# Patient Record
Sex: FEMALE | Employment: OTHER | URBAN - METROPOLITAN AREA
[De-identification: names, ages, dates, MRNs, and addresses within clinical notes are randomized per-mention and may not be internally consistent; named-entity substitution may affect disease eponyms.]

---

## 2022-08-16 ENCOUNTER — APPOINTMENT (OUTPATIENT)
Dept: CT IMAGING | Age: 82
End: 2022-08-16
Payer: MEDICARE

## 2022-08-16 ENCOUNTER — APPOINTMENT (OUTPATIENT)
Dept: GENERAL RADIOLOGY | Age: 82
End: 2022-08-16
Payer: MEDICARE

## 2022-08-16 ENCOUNTER — HOSPITAL ENCOUNTER (EMERGENCY)
Age: 82
Discharge: LEFT BEFORE DISCHARGE | End: 2022-08-16
Attending: EMERGENCY MEDICINE
Payer: MEDICARE

## 2022-08-16 VITALS
DIASTOLIC BLOOD PRESSURE: 92 MMHG | SYSTOLIC BLOOD PRESSURE: 187 MMHG | BODY MASS INDEX: 29.16 KG/M2 | TEMPERATURE: 97.6 F | HEART RATE: 106 BPM | OXYGEN SATURATION: 96 % | WEIGHT: 175 LBS | RESPIRATION RATE: 19 BRPM | HEIGHT: 65 IN

## 2022-08-16 DIAGNOSIS — R05.9 COUGH: ICD-10-CM

## 2022-08-16 DIAGNOSIS — J44.1 COPD EXACERBATION (HCC): Primary | ICD-10-CM

## 2022-08-16 LAB
ALBUMIN SERPL-MCNC: 4 G/DL (ref 3.5–5.2)
ALP BLD-CCNC: 70 U/L (ref 35–104)
ALT SERPL-CCNC: 18 U/L (ref 0–32)
ANION GAP SERPL CALCULATED.3IONS-SCNC: 8 MMOL/L (ref 7–16)
AST SERPL-CCNC: 22 U/L (ref 0–31)
BACTERIA: ABNORMAL /HPF
BASOPHILS ABSOLUTE: 0.06 E9/L (ref 0–0.2)
BASOPHILS RELATIVE PERCENT: 0.9 % (ref 0–2)
BILIRUB SERPL-MCNC: 0.2 MG/DL (ref 0–1.2)
BILIRUBIN URINE: NEGATIVE
BLOOD, URINE: ABNORMAL
BUN BLDV-MCNC: 14 MG/DL (ref 6–23)
CALCIUM SERPL-MCNC: 9.6 MG/DL (ref 8.6–10.2)
CHLORIDE BLD-SCNC: 103 MMOL/L (ref 98–107)
CLARITY: CLEAR
CO2: 30 MMOL/L (ref 22–29)
COLOR: YELLOW
CREAT SERPL-MCNC: 0.9 MG/DL (ref 0.5–1)
D DIMER: 810 NG/ML DDU
EKG ATRIAL RATE: 86 BPM
EKG P AXIS: 75 DEGREES
EKG P-R INTERVAL: 102 MS
EKG Q-T INTERVAL: 336 MS
EKG QRS DURATION: 80 MS
EKG QTC CALCULATION (BAZETT): 390 MS
EKG R AXIS: 45 DEGREES
EKG T AXIS: 90 DEGREES
EKG VENTRICULAR RATE: 81 BPM
EOSINOPHILS ABSOLUTE: 0.07 E9/L (ref 0.05–0.5)
EOSINOPHILS RELATIVE PERCENT: 1.1 % (ref 0–6)
GFR AFRICAN AMERICAN: >60
GFR NON-AFRICAN AMERICAN: >60 ML/MIN/1.73
GLUCOSE BLD-MCNC: 144 MG/DL (ref 74–99)
GLUCOSE URINE: NEGATIVE MG/DL
HCT VFR BLD CALC: 34.3 % (ref 34–48)
HEMOGLOBIN: 10 G/DL (ref 11.5–15.5)
IMMATURE GRANULOCYTES #: 0.1 E9/L
IMMATURE GRANULOCYTES %: 1.6 % (ref 0–5)
INFLUENZA A BY PCR: NOT DETECTED
INFLUENZA B BY PCR: NOT DETECTED
INR BLD: 1
KETONES, URINE: NEGATIVE MG/DL
LEUKOCYTE ESTERASE, URINE: NEGATIVE
LYMPHOCYTES ABSOLUTE: 0.75 E9/L (ref 1.5–4)
LYMPHOCYTES RELATIVE PERCENT: 11.8 % (ref 20–42)
MAGNESIUM: 2.5 MG/DL (ref 1.6–2.6)
MCH RBC QN AUTO: 28.3 PG (ref 26–35)
MCHC RBC AUTO-ENTMCNC: 29.2 % (ref 32–34.5)
MCV RBC AUTO: 97.2 FL (ref 80–99.9)
MONOCYTES ABSOLUTE: 0.42 E9/L (ref 0.1–0.95)
MONOCYTES RELATIVE PERCENT: 6.6 % (ref 2–12)
NEUTROPHILS ABSOLUTE: 4.96 E9/L (ref 1.8–7.3)
NEUTROPHILS RELATIVE PERCENT: 78 % (ref 43–80)
NITRITE, URINE: NEGATIVE
PDW BLD-RTO: 14.4 FL (ref 11.5–15)
PH UA: 8 (ref 5–9)
PLATELET # BLD: 335 E9/L (ref 130–450)
PMV BLD AUTO: 8.5 FL (ref 7–12)
POTASSIUM REFLEX MAGNESIUM: 3.8 MMOL/L (ref 3.5–5)
PROTEIN UA: NEGATIVE MG/DL
PROTHROMBIN TIME: 10.8 SEC (ref 9.3–12.4)
RBC # BLD: 3.53 E12/L (ref 3.5–5.5)
RBC UA: ABNORMAL /HPF (ref 0–2)
SARS-COV-2, NAAT: NOT DETECTED
SODIUM BLD-SCNC: 141 MMOL/L (ref 132–146)
SPECIFIC GRAVITY UA: 1.01 (ref 1–1.03)
TOTAL PROTEIN: 7.1 G/DL (ref 6.4–8.3)
TROPONIN, HIGH SENSITIVITY: 19 NG/L (ref 0–9)
TROPONIN, HIGH SENSITIVITY: 22 NG/L (ref 0–9)
UROBILINOGEN, URINE: 0.2 E.U./DL
WBC # BLD: 6.4 E9/L (ref 4.5–11.5)
WBC UA: ABNORMAL /HPF (ref 0–5)

## 2022-08-16 PROCEDURE — 85378 FIBRIN DEGRADE SEMIQUANT: CPT

## 2022-08-16 PROCEDURE — 6370000000 HC RX 637 (ALT 250 FOR IP)

## 2022-08-16 PROCEDURE — 83735 ASSAY OF MAGNESIUM: CPT

## 2022-08-16 PROCEDURE — 80053 COMPREHEN METABOLIC PANEL: CPT

## 2022-08-16 PROCEDURE — 71275 CT ANGIOGRAPHY CHEST: CPT

## 2022-08-16 PROCEDURE — 99285 EMERGENCY DEPT VISIT HI MDM: CPT

## 2022-08-16 PROCEDURE — 81001 URINALYSIS AUTO W/SCOPE: CPT

## 2022-08-16 PROCEDURE — 85610 PROTHROMBIN TIME: CPT

## 2022-08-16 PROCEDURE — 94640 AIRWAY INHALATION TREATMENT: CPT

## 2022-08-16 PROCEDURE — 84484 ASSAY OF TROPONIN QUANT: CPT

## 2022-08-16 PROCEDURE — 36415 COLL VENOUS BLD VENIPUNCTURE: CPT

## 2022-08-16 PROCEDURE — 93005 ELECTROCARDIOGRAM TRACING: CPT

## 2022-08-16 PROCEDURE — 6360000002 HC RX W HCPCS

## 2022-08-16 PROCEDURE — 85025 COMPLETE CBC W/AUTO DIFF WBC: CPT

## 2022-08-16 PROCEDURE — 71045 X-RAY EXAM CHEST 1 VIEW: CPT

## 2022-08-16 PROCEDURE — 87635 SARS-COV-2 COVID-19 AMP PRB: CPT

## 2022-08-16 PROCEDURE — 96374 THER/PROPH/DIAG INJ IV PUSH: CPT

## 2022-08-16 PROCEDURE — 87502 INFLUENZA DNA AMP PROBE: CPT

## 2022-08-16 PROCEDURE — 6360000004 HC RX CONTRAST MEDICATION: Performed by: RADIOLOGY

## 2022-08-16 RX ORDER — METHYLPREDNISOLONE SODIUM SUCCINATE 125 MG/2ML
80 INJECTION, POWDER, LYOPHILIZED, FOR SOLUTION INTRAMUSCULAR; INTRAVENOUS ONCE
Status: COMPLETED | OUTPATIENT
Start: 2022-08-16 | End: 2022-08-16

## 2022-08-16 RX ORDER — GUAIFENESIN 600 MG/1
600 TABLET, EXTENDED RELEASE ORAL 2 TIMES DAILY
Qty: 10 TABLET | Refills: 0 | Status: SHIPPED | OUTPATIENT
Start: 2022-08-16 | End: 2022-08-21

## 2022-08-16 RX ORDER — IPRATROPIUM BROMIDE AND ALBUTEROL SULFATE 2.5; .5 MG/3ML; MG/3ML
1 SOLUTION RESPIRATORY (INHALATION)
Status: DISCONTINUED | OUTPATIENT
Start: 2022-08-16 | End: 2022-08-16 | Stop reason: HOSPADM

## 2022-08-16 RX ORDER — PREDNISONE 20 MG/1
40 TABLET ORAL DAILY
Qty: 10 TABLET | Refills: 0 | Status: SHIPPED | OUTPATIENT
Start: 2022-08-16 | End: 2022-08-21

## 2022-08-16 RX ORDER — ASPIRIN 81 MG/1
324 TABLET, CHEWABLE ORAL ONCE
Status: COMPLETED | OUTPATIENT
Start: 2022-08-16 | End: 2022-08-16

## 2022-08-16 RX ORDER — LORAZEPAM 0.5 MG/1
0.5 TABLET ORAL ONCE
Status: COMPLETED | OUTPATIENT
Start: 2022-08-16 | End: 2022-08-16

## 2022-08-16 RX ORDER — AZITHROMYCIN 500 MG/1
500 TABLET, FILM COATED ORAL DAILY
Qty: 5 TABLET | Refills: 0 | Status: SHIPPED | OUTPATIENT
Start: 2022-08-16 | End: 2022-08-21

## 2022-08-16 RX ORDER — BENZONATATE 100 MG/1
100 CAPSULE ORAL 2 TIMES DAILY PRN
Qty: 10 CAPSULE | Refills: 0 | Status: SHIPPED | OUTPATIENT
Start: 2022-08-16 | End: 2022-08-21

## 2022-08-16 RX ADMIN — IPRATROPIUM BROMIDE AND ALBUTEROL SULFATE 1 AMPULE: 2.5; .5 SOLUTION RESPIRATORY (INHALATION) at 15:51

## 2022-08-16 RX ADMIN — ASPIRIN 81 MG CHEWABLE TABLET 324 MG: 81 TABLET CHEWABLE at 11:37

## 2022-08-16 RX ADMIN — LORAZEPAM 0.5 MG: 0.5 TABLET ORAL at 14:28

## 2022-08-16 RX ADMIN — METHYLPREDNISOLONE SODIUM SUCCINATE 80 MG: 125 INJECTION, POWDER, FOR SOLUTION INTRAMUSCULAR; INTRAVENOUS at 14:28

## 2022-08-16 RX ADMIN — IPRATROPIUM BROMIDE AND ALBUTEROL SULFATE 1 AMPULE: 2.5; .5 SOLUTION RESPIRATORY (INHALATION) at 11:35

## 2022-08-16 RX ADMIN — IOPAMIDOL 65 ML: 755 INJECTION, SOLUTION INTRAVENOUS at 12:59

## 2022-08-16 ASSESSMENT — ENCOUNTER SYMPTOMS
CHEST TIGHTNESS: 1
EYE DISCHARGE: 0
ABDOMINAL DISTENTION: 0
EYE ITCHING: 0
BACK PAIN: 0
SHORTNESS OF BREATH: 1
COLOR CHANGE: 0
WHEEZING: 1
ABDOMINAL PAIN: 0

## 2022-08-16 ASSESSMENT — PAIN DESCRIPTION - LOCATION: LOCATION: CHEST

## 2022-08-16 ASSESSMENT — PAIN - FUNCTIONAL ASSESSMENT: PAIN_FUNCTIONAL_ASSESSMENT: 0-10

## 2022-08-16 ASSESSMENT — PAIN DESCRIPTION - DESCRIPTORS: DESCRIPTORS: PRESSURE

## 2022-08-16 ASSESSMENT — PAIN SCALES - GENERAL: PAINLEVEL_OUTOF10: 8

## 2022-08-16 NOTE — ED NOTES
Patient states she uses 6 L O2 NC at baseline at home and has for several years. SPO2 100% on 6L NC. Patient titrated down to 2 L NC by Dr Bj Carlos and patient at 97%.      Klaudia Clay RN  08/16/22 6006

## 2022-08-16 NOTE — ED PROVIDER NOTES
L.  Patient reports, and her  reports, this is her baseline. We recommended the patient stay for further evaluation work-up. They are adamant that they want to go home, and that this is normal for her. They decided to sign out AMA. The attending physician, resident physician and the nurse discussed all the risks/rewards/benefits of staying or leaving AMA. We Discussed the risk of hypoxia leading to cardiopulmonary arrest.  The patient and her  were adamant on leaving.  stated, \"I have been taking care of her for over 48 years. \"  They were given a primary care physician within the [facility] to contact. She was also prescribed azithromycin and prednisone for 5 days, Mucinex, Tessalon Perles. All questions and concerns answered. Return precautions to the ER given. ED Course as of 08/16/22 1631   Tue Aug 16, 2022   1046 EKG Interpretation    Interpreted by emergency department physician    Rhythm: normal sinus   Rate: normal  Axis: normal  Ectopy: none  Conduction: normal  ST Segments: no acute change  T Waves: no acute change  Q Waves: none    Clinical Impression: no acute changes    Avelina Self DO  [JR]   1626 This patient has chosen to leave against medical advice. I have personally explained to them that choosing to do so may result in permanent bodily harm or death. I discussed at length that without further evaluation and monitoring there may be unforeseen circumstances and deterioration resulting in permanent bodily harm or death as a result of their choice. They are alert, oriented, and competent at this time. They state that they are aware of the serious risks as explained, but they continue to wish to leave against medical advice. Patient instructed to follow up with PCP tomorrow. They have been advised that they should return to the ED immediately if they change their mind at any time, or if their condition begins to change or worsen.   [JR]      ED Course User Index  [JR] Narinder Wynne DO         ED Course as of 08/16/22 1631   Tue Aug 16, 2022   1046 EKG Interpretation    Interpreted by emergency department physician    Rhythm: normal sinus   Rate: normal  Axis: normal  Ectopy: none  Conduction: normal  ST Segments: no acute change  T Waves: no acute change  Q Waves: none    Clinical Impression: no acute changes    Narinder Wynne DO  [JR]   1623 This patient has chosen to leave against medical advice. I have personally explained to them that choosing to do so may result in permanent bodily harm or death. I discussed at length that without further evaluation and monitoring there may be unforeseen circumstances and deterioration resulting in permanent bodily harm or death as a result of their choice. They are alert, oriented, and competent at this time. They state that they are aware of the serious risks as explained, but they continue to wish to leave against medical advice. Patient instructed to follow up with PCP tomorrow. They have been advised that they should return to the ED immediately if they change their mind at any time, or if their condition begins to change or worsen. [JR]      ED Course User Index  [JR] Narinder Wynne DO       --------------------------------------------- PAST HISTORY ---------------------------------------------  Past Medical History:  has no past medical history on file. Past Surgical History:  has no past surgical history on file. Social History:      Family History: family history is not on file. The patients home medications have been reviewed. Allergies: Patient has no known allergies.     -------------------------------------------------- RESULTS -------------------------------------------------  Labs:  Results for orders placed or performed during the hospital encounter of 08/16/22   COVID-19, Rapid    Specimen: Nasopharyngeal Swab   Result Value Ref Range    SARS-CoV-2, NAAT Not Detected Not Detected   RAPID INFLUENZA A/B ANTIGENS    Specimen: Nasopharyngeal   Result Value Ref Range    Influenza A by PCR Not Detected Not Detected    Influenza B by PCR Not Detected Not Detected   CBC with Auto Differential   Result Value Ref Range    WBC 6.4 4.5 - 11.5 E9/L    RBC 3.53 3.50 - 5.50 E12/L    Hemoglobin 10.0 (L) 11.5 - 15.5 g/dL    Hematocrit 34.3 34.0 - 48.0 %    MCV 97.2 80.0 - 99.9 fL    MCH 28.3 26.0 - 35.0 pg    MCHC 29.2 (L) 32.0 - 34.5 %    RDW 14.4 11.5 - 15.0 fL    Platelets 015 543 - 818 E9/L    MPV 8.5 7.0 - 12.0 fL    Neutrophils % 78.0 43.0 - 80.0 %    Immature Granulocytes % 1.6 0.0 - 5.0 %    Lymphocytes % 11.8 (L) 20.0 - 42.0 %    Monocytes % 6.6 2.0 - 12.0 %    Eosinophils % 1.1 0.0 - 6.0 %    Basophils % 0.9 0.0 - 2.0 %    Neutrophils Absolute 4.96 1.80 - 7.30 E9/L    Immature Granulocytes # 0.10 E9/L    Lymphocytes Absolute 0.75 (L) 1.50 - 4.00 E9/L    Monocytes Absolute 0.42 0.10 - 0.95 E9/L    Eosinophils Absolute 0.07 0.05 - 0.50 E9/L    Basophils Absolute 0.06 0.00 - 0.20 E9/L   Comprehensive Metabolic Panel w/ Reflex to MG   Result Value Ref Range    Sodium 141 132 - 146 mmol/L    Potassium reflex Magnesium 3.8 3.5 - 5.0 mmol/L    Chloride 103 98 - 107 mmol/L    CO2 30 (H) 22 - 29 mmol/L    Anion Gap 8 7 - 16 mmol/L    Glucose 144 (H) 74 - 99 mg/dL    BUN 14 6 - 23 mg/dL    Creatinine 0.9 0.5 - 1.0 mg/dL    GFR Non-African American >60 >=60 mL/min/1.73    GFR African American >60     Calcium 9.6 8.6 - 10.2 mg/dL    Total Protein 7.1 6.4 - 8.3 g/dL    Albumin 4.0 3.5 - 5.2 g/dL    Total Bilirubin 0.2 0.0 - 1.2 mg/dL    Alkaline Phosphatase 70 35 - 104 U/L    ALT 18 0 - 32 U/L    AST 22 0 - 31 U/L   Magnesium   Result Value Ref Range    Magnesium 2.5 1.6 - 2.6 mg/dL   Urinalysis   Result Value Ref Range    Color, UA Yellow Straw/Yellow    Clarity, UA Clear Clear    Glucose, Ur Negative Negative mg/dL    Bilirubin Urine Negative Negative    Ketones, Urine Negative Negative mg/dL Specific Gravity, UA 1.010 1.005 - 1.030    Blood, Urine LARGE (A) Negative    pH, UA 8.0 5.0 - 9.0    Protein, UA Negative Negative mg/dL    Urobilinogen, Urine 0.2 <2.0 E.U./dL    Nitrite, Urine Negative Negative    Leukocyte Esterase, Urine Negative Negative   Protime-INR   Result Value Ref Range    Protime 10.8 9.3 - 12.4 sec    INR 1.0    D-Dimer, Quantitative   Result Value Ref Range    D-Dimer, Quant 810 ng/mL DDU   Troponin   Result Value Ref Range    Troponin, High Sensitivity 22 (H) 0 - 9 ng/L   Troponin   Result Value Ref Range    Troponin, High Sensitivity 19 (H) 0 - 9 ng/L   Microscopic Urinalysis   Result Value Ref Range    WBC, UA NONE 0 - 5 /HPF    RBC, UA 1-3 0 - 2 /HPF    Bacteria, UA MANY (A) None Seen /HPF   EKG 12 Lead   Result Value Ref Range    Ventricular Rate 81 BPM    Atrial Rate 86 BPM    P-R Interval 102 ms    QRS Duration 80 ms    Q-T Interval 336 ms    QTc Calculation (Bazett) 390 ms    P Axis 75 degrees    R Axis 45 degrees    T Axis 90 degrees       Radiology:  CTA PULMONARY W CONTRAST   Final Result   No visible pulmonary embolism. Mild atelectasis or scarring in both lungs. Very small hiatal hernia. XR CHEST PORTABLE   Final Result   No acute process. ------------------------- NURSING NOTES AND VITALS REVIEWED ---------------------------  Date / Time Roomed:  8/16/2022 10:31 AM  ED Bed Assignment:  02/02    The nursing notes within the ED encounter and vital signs as below have been reviewed.    BP (!) 187/92   Pulse (!) 106   Temp 97.6 °F (36.4 °C)   Resp 19   Ht 5' 5\" (1.651 m)   Wt 175 lb (79.4 kg)   SpO2 96%   BMI 29.12 kg/m²   Oxygen Saturation Interpretation: Normal      ------------------------------------------ PROGRESS NOTES ------------------------------------------  4:05 PM EDT  I have spoken with the patient and discussed todays results, in addition to providing specific details for the plan of care and counseling regarding the diagnosis and prognosis. Their questions are answered at this time and they are agreeable with the plan. I discussed at length with them reasons for immediate return here for re evaluation. They will followup with their primary care physician by calling their office tomorrow. --------------------------------- ADDITIONAL PROVIDER NOTES ---------------------------------  At this time the patient is without objective evidence of an acute process requiring hospitalization or inpatient management. They have remained hemodynamically stable throughout their entire ED visit and are stable for discharge with outpatient follow-up. The plan has been discussed in detail and they are aware of the specific conditions for emergent return, as well as the importance of follow-up. New Prescriptions    AZITHROMYCIN (ZITHROMAX) 500 MG TABLET    Take 1 tablet by mouth in the morning for 5 days. BENZONATATE (TESSALON) 100 MG CAPSULE    Take 1 capsule by mouth 2 times daily as needed for Cough    GUAIFENESIN (MUCINEX) 600 MG EXTENDED RELEASE TABLET    Take 1 tablet by mouth in the morning and 1 tablet before bedtime. Do all this for 5 days. PREDNISONE (DELTASONE) 20 MG TABLET    Take 2 tablets by mouth in the morning for 5 days. Diagnosis:  1. COPD exacerbation ([address] Improving   2. Cough Improving       Disposition:  Patient's disposition: AMA  Patient's condition is stable. Unfortunately, Javier Bond at 4:31 PM decided to leave the Emergency Department Against Medical Advice. Javier Bond is clinically sober, free of any distracting injury, appears to be of sound mind with intact judgement and insight, and in my opinion has the capacity to make decisions. she presented to the Emergency Department to be evaluated for Chest Pain (Rx pneumonia dx on augmentin.   Wears 6L at baseline), Cough, and Otalgia   and understands that I am concerned about the possibility of respiratory arrest, hypoxia, and death. I have explained the nature of the evaluation so for and she understands the results and that the evaluation so far has been limited and cannot exclude disability, cardiovascular arrest, cardiopulmonary arrest, hypoxemia, and death and that by not undergoing further evaluation and management specific risks include: Permanent disability and death. We have discussed alternative treatments and the patient was given Rx of Azithromycin, prednizone, mucinex, and tessalon perles and referred to DO Jorje Jacob [facility] is unwilling to stay for the recommended evaluation and management and wishes to leave against medical advice. I am unable to convince the patient to stay, I have asked them to return as soon as possible to complete their evaluation.  I have answered all their questions         Miles Cantu DO  Resident  08/16/22 9940

## 2022-08-16 NOTE — ED NOTES
Patient ambulated on 6L starting at 97% and ending at 72%.  Tech stayed with patient until O2 returned to 98% on [email or web address]  08/16/22 1912

## 2022-08-16 NOTE — ED NOTES
Physician aware of vitals. Patient leaving AMA and has been explained risks of leaving AMA by Dr Jerrod Wells and Dr Queen Vidal, patient and  voiced understanding.       Wendy Friday, RUFINO  08/16/22 4236

## 2022-09-08 ENCOUNTER — OFFICE VISIT (OUTPATIENT)
Dept: FAMILY MEDICINE CLINIC | Age: 82
End: 2022-09-08
Payer: MEDICARE

## 2022-09-08 VITALS
DIASTOLIC BLOOD PRESSURE: 60 MMHG | TEMPERATURE: 98 F | OXYGEN SATURATION: 94 % | HEART RATE: 78 BPM | SYSTOLIC BLOOD PRESSURE: 132 MMHG | WEIGHT: 170 LBS | BODY MASS INDEX: 28.29 KG/M2

## 2022-09-08 DIAGNOSIS — Z86.718 HISTORY OF DVT (DEEP VEIN THROMBOSIS): ICD-10-CM

## 2022-09-08 DIAGNOSIS — J44.9 END STAGE COPD (HCC): ICD-10-CM

## 2022-09-08 DIAGNOSIS — F41.9 ANXIETY: Primary | ICD-10-CM

## 2022-09-08 DIAGNOSIS — F40.00 AGORAPHOBIA: ICD-10-CM

## 2022-09-08 DIAGNOSIS — I10 HYPERTENSION, UNSPECIFIED TYPE: ICD-10-CM

## 2022-09-08 PROCEDURE — 99204 OFFICE O/P NEW MOD 45 MIN: CPT | Performed by: STUDENT IN AN ORGANIZED HEALTH CARE EDUCATION/TRAINING PROGRAM

## 2022-09-08 PROCEDURE — 1123F ACP DISCUSS/DSCN MKR DOCD: CPT | Performed by: STUDENT IN AN ORGANIZED HEALTH CARE EDUCATION/TRAINING PROGRAM

## 2022-09-08 RX ORDER — ASPIRIN 81 MG/1
81 TABLET, CHEWABLE ORAL DAILY
COMMUNITY
End: 2022-10-13

## 2022-09-08 RX ORDER — ALBUTEROL SULFATE 90 UG/1
2 AEROSOL, METERED RESPIRATORY (INHALATION) EVERY 6 HOURS PRN
COMMUNITY

## 2022-09-08 RX ORDER — LOSARTAN POTASSIUM 100 MG/1
100 TABLET ORAL DAILY
COMMUNITY
Start: 2022-09-06

## 2022-09-08 RX ORDER — FERROUS SULFATE 325(65) MG
325 TABLET ORAL
COMMUNITY

## 2022-09-08 RX ORDER — ALPRAZOLAM 0.25 MG/1
0.25 TABLET ORAL 3 TIMES DAILY
COMMUNITY
Start: 2022-09-06 | End: 2022-10-07 | Stop reason: SDUPTHER

## 2022-09-08 RX ORDER — ACETAZOLAMIDE 250 MG/1
250 TABLET ORAL DAILY
COMMUNITY
Start: 2022-08-17 | End: 2022-10-13 | Stop reason: SDUPTHER

## 2022-09-08 RX ORDER — PANTOPRAZOLE SODIUM 40 MG/1
40 TABLET, DELAYED RELEASE ORAL DAILY
COMMUNITY

## 2022-09-08 RX ORDER — AMLODIPINE BESYLATE 5 MG/1
5 TABLET ORAL DAILY
COMMUNITY
Start: 2022-08-04

## 2022-09-08 RX ORDER — FLUTICASONE FUROATE, UMECLIDINIUM BROMIDE AND VILANTEROL TRIFENATATE 100; 62.5; 25 UG/1; UG/1; UG/1
1 POWDER RESPIRATORY (INHALATION) DAILY
COMMUNITY
Start: 2022-08-29 | End: 2022-09-19 | Stop reason: SDUPTHER

## 2022-09-08 RX ORDER — CETIRIZINE HYDROCHLORIDE 10 MG/1
10 TABLET ORAL DAILY
COMMUNITY
End: 2022-10-05 | Stop reason: ALTCHOICE

## 2022-09-08 RX ORDER — IPRATROPIUM BROMIDE AND ALBUTEROL SULFATE 2.5; .5 MG/3ML; MG/3ML
1 SOLUTION RESPIRATORY (INHALATION) 3 TIMES DAILY
COMMUNITY
Start: 2022-07-02 | End: 2022-10-10 | Stop reason: SDUPTHER

## 2022-09-08 SDOH — ECONOMIC STABILITY: FOOD INSECURITY: WITHIN THE PAST 12 MONTHS, YOU WORRIED THAT YOUR FOOD WOULD RUN OUT BEFORE YOU GOT MONEY TO BUY MORE.: NEVER TRUE

## 2022-09-08 SDOH — ECONOMIC STABILITY: FOOD INSECURITY: WITHIN THE PAST 12 MONTHS, THE FOOD YOU BOUGHT JUST DIDN'T LAST AND YOU DIDN'T HAVE MONEY TO GET MORE.: NEVER TRUE

## 2022-09-08 ASSESSMENT — PATIENT HEALTH QUESTIONNAIRE - PHQ9
8. MOVING OR SPEAKING SO SLOWLY THAT OTHER PEOPLE COULD HAVE NOTICED. OR THE OPPOSITE, BEING SO FIGETY OR RESTLESS THAT YOU HAVE BEEN MOVING AROUND A LOT MORE THAN USUAL: 0
7. TROUBLE CONCENTRATING ON THINGS, SUCH AS READING THE NEWSPAPER OR WATCHING TELEVISION: 1
2. FEELING DOWN, DEPRESSED OR HOPELESS: 2
5. POOR APPETITE OR OVEREATING: 1
SUM OF ALL RESPONSES TO PHQ9 QUESTIONS 1 & 2: 3
SUM OF ALL RESPONSES TO PHQ QUESTIONS 1-9: 7
SUM OF ALL RESPONSES TO PHQ QUESTIONS 1-9: 7
10. IF YOU CHECKED OFF ANY PROBLEMS, HOW DIFFICULT HAVE THESE PROBLEMS MADE IT FOR YOU TO DO YOUR WORK, TAKE CARE OF THINGS AT HOME, OR GET ALONG WITH OTHER PEOPLE: 0
1. LITTLE INTEREST OR PLEASURE IN DOING THINGS: 1
SUM OF ALL RESPONSES TO PHQ QUESTIONS 1-9: 7
3. TROUBLE FALLING OR STAYING ASLEEP: 0
9. THOUGHTS THAT YOU WOULD BE BETTER OFF DEAD, OR OF HURTING YOURSELF: 0
4. FEELING TIRED OR HAVING LITTLE ENERGY: 2
6. FEELING BAD ABOUT YOURSELF - OR THAT YOU ARE A FAILURE OR HAVE LET YOURSELF OR YOUR FAMILY DOWN: 0
SUM OF ALL RESPONSES TO PHQ QUESTIONS 1-9: 7

## 2022-09-08 ASSESSMENT — ENCOUNTER SYMPTOMS
SHORTNESS OF BREATH: 1
NAUSEA: 0
ABDOMINAL PAIN: 0
RHINORRHEA: 0
COUGH: 0
VOMITING: 0
WHEEZING: 1

## 2022-09-08 ASSESSMENT — SOCIAL DETERMINANTS OF HEALTH (SDOH): HOW HARD IS IT FOR YOU TO PAY FOR THE VERY BASICS LIKE FOOD, HOUSING, MEDICAL CARE, AND HEATING?: NOT HARD AT ALL

## 2022-09-08 NOTE — PROGRESS NOTES
[facility]  Office Note  Dr. Betzy Scott      Patient:  Norberto Gonsalez 80 y.o. female     Date of Service: 22      Chief complaint:   Chief Complaint   Patient presents with    Establish Care     Needs pulmonary referral    COPD         History of Present Illness   The patient is a 80 y.o. female  presented to the clinic with complaints as above. Anxiety-she takes xanax three times a day. She has never tried any other medications. She gets extremely anxious to the point of becoming tearful any time she has to leave the house to go anywhere. She has been on xanax for years but her anxiety when leaving the house has been going on for about 2 months and is getting worse    COPD-on trelogy. Hx of smoking, quit almost 20 years ago. On continuous oxygen about 6 L. She would like a new pulm referral.    Has a history of L BKA from what sounds like severe claudication or some type of vascular issue. No problems with R leg currently except easy bruising. She is on aspirin    Hx of 2 DVT-both after surgery. She takes a baby aspirin now    HTN-at goal today, on norvasc losartan and metoprolol. No CP or leg swelling    Past Medical History:  No past medical history on file. PastSurgical History:    No past surgical history on file. Allergies:    Patient has no known allergies.     Social History:   Social History     Socioeconomic History    Marital status: Unknown     Spouse name: Not on file    Number of children: Not on file    Years of education: Not on file    Highest education level: Not on file   Occupational History    Not on file   Tobacco Use    Smoking status: Former     Types: Cigarettes     Quit date:      Years since quittin.6    Smokeless tobacco: Never   Substance and Sexual Activity    Alcohol use: Yes     Comment: socially    Drug use: Never    Sexual activity: Not on file   Other Topics Concern    Not on file   Social History Narrative    Not on file     Social Determinants of Health     Financial Resource Strain: Low Risk     Difficulty of Paying Living Expenses: Not hard at all   Food Insecurity: No Food Insecurity    Worried About Running Out of Food in the Last Year: Never true    Ran Out of Food in the Last Year: Never true   Transportation Needs: Not on file   Physical Activity: Not on file   Stress: Not on file   Social Connections: Not on file   Intimate Partner Violence: Not on file   Housing Stability: Not on file        Family History:   No family history on file. Review of Systems:   Review of Systems   Constitutional:  Negative for chills and fever. HENT:  Negative for congestion and rhinorrhea. Respiratory:  Positive for shortness of breath (chronic) and wheezing. Negative for cough. Cardiovascular:  Negative for chest pain and leg swelling. Gastrointestinal:  Negative for abdominal pain, nausea and vomiting. Genitourinary:  Negative for dysuria and hematuria. Musculoskeletal:  Negative for arthralgias and myalgias. Skin:  Negative for rash and wound. Neurological:  Negative for dizziness and light-headedness. Hematological:  Bruises/bleeds easily. Psychiatric/Behavioral:  Positive for dysphoric mood. Negative for suicidal ideas. The patient is nervous/anxious. Physical Exam   Vitals: /60   Pulse 78   Temp 98 °F (36.7 °C)   Wt 170 lb (77.1 kg)   SpO2 94% Comment: 4liters  BMI 28.29 kg/m²   Physical Exam    General:  Awake, alert, oriented X 3. Well developed, well nourished, well groomed. No apparent distress. HEENT:  Normocephalic, atraumatic. No scleral icterus. No conjunctival injection. No nasal discharge. Neck:  Supple  Heart:  RRR, no murmurs, gallops, or rubs  Lungs:  decreased breath sounds in all lung fields. no wheeze, rales, or rhonchi  Abdomen: Bowel sounds present, soft, nontender, no masses, no organomegaly, no peritoneal signs  Extremities:  No clubbing, cyanosis, or edema. L BKA.  Multiple healing bruises on R shin  Skin:  Warm and dry, no open lesions or rash  Neuro:  Cranial nerves 2-12 intact, no focal deficits    Assessment and Plan   Need to get records from old PCP  Will likely refer to vascular in the future-some care everywhere notes mention iliac artery stenosis. She has end stage COPD on oxygen and trelogy. She wants to wait to establish with pulm at the moment  Would prefer she stop or at least take less xanax-she has never tried an SSRI, will rx zoloft and she will try to wean down xanax. Follow up in a month to monitor progress. Discussed risks associated with xanax  Continue current HTN regimen, at goal    1. Anxiety      2. Agoraphobia      3. End stage COPD ([facility] [address]      4. History of DVT (deep vein thrombosis)      5. Hypertension, unspecified type      Counseled regarding above diagnosis, including possible risks and complications,  especially if left uncontrolled. Counseled regarding the possible side effects, risks, benefits and alternatives to treatment;patient and/or guardian verbalizes understanding, agrees, feels comfortable with and wishes to proceed with above treatment plan. Call or go to ED immediately if symptoms worsen or persist. Advised patient to call with any new medication issues, and, as applicable, read all Rx info from pharmacy to assure aware of all possible risks and side effects of medicationbefore taking. Patient and/or guardian given opportunity to ask questions/raise concerns. The patient verbalized comfort and understanding ofinstructions. Return to Office: Return in about 4 weeks (around 10/6/2022). Medication List:    Current Outpatient Medications   Medication Sig Dispense Refill    acetaZOLAMIDE (DIAMOX) 250 MG tablet 250 mg daily      ALPRAZolam (XANAX) 0.25 MG tablet Take 0.25 mg by mouth in the morning, at noon, and at bedtime.       amLODIPine (NORVASC) 5 MG tablet 5 mg daily      TRELEGY ELLIPTA 100-62.5-25 MCG/INH AEPB 1 puff daily ipratropium-albuterol (DUONEB) 0.5-2.5 (3) MG/3ML SOLN nebulizer solution Inhale 1 vial into the lungs 3 times daily      losartan (COZAAR) 100 MG tablet Take 100 mg by mouth daily      pantoprazole (PROTONIX) 40 MG tablet Take 40 mg by mouth daily      OXYGEN Inhale into the lungs      ferrous sulfate (IRON 325) 325 (65 Fe) MG tablet Take 325 mg by mouth daily (with breakfast)      cetirizine (ZYRTEC) 10 MG tablet Take 10 mg by mouth daily      metoprolol tartrate (LOPRESSOR) 25 MG tablet Take 25 mg by mouth daily      albuterol sulfate HFA (PROVENTIL;VENTOLIN;PROAIR) 108 (90 Base) MCG/ACT inhaler Inhale 2 puffs into the lungs every 6 hours as needed      aspirin 81 MG chewable tablet Take 81 mg by mouth daily      sertraline (ZOLOFT) 50 MG tablet Take 1 tablet by mouth daily 30 tablet 1     No current facility-administered medications for this visit. Alton Brown MD         This document may have been prepared at least partially through the use of voice recognition software. Although effort is taken to assure the accuracy ofthis document, it is possible that grammatical, syntax, or spelling errors may occur.

## 2022-09-12 ENCOUNTER — OFFICE VISIT (OUTPATIENT)
Dept: FAMILY MEDICINE CLINIC | Age: 82
End: 2022-09-12
Payer: MEDICARE

## 2022-09-12 VITALS
DIASTOLIC BLOOD PRESSURE: 68 MMHG | TEMPERATURE: 98.6 F | WEIGHT: 171 LBS | SYSTOLIC BLOOD PRESSURE: 128 MMHG | HEIGHT: 65 IN | HEART RATE: 67 BPM | BODY MASS INDEX: 28.49 KG/M2 | OXYGEN SATURATION: 95 % | RESPIRATION RATE: 18 BRPM

## 2022-09-12 DIAGNOSIS — S81.811A LACERATION OF RIGHT LOWER EXTREMITY, INITIAL ENCOUNTER: Primary | ICD-10-CM

## 2022-09-12 PROCEDURE — 1123F ACP DISCUSS/DSCN MKR DOCD: CPT | Performed by: STUDENT IN AN ORGANIZED HEALTH CARE EDUCATION/TRAINING PROGRAM

## 2022-09-12 PROCEDURE — 99213 OFFICE O/P EST LOW 20 MIN: CPT | Performed by: STUDENT IN AN ORGANIZED HEALTH CARE EDUCATION/TRAINING PROGRAM

## 2022-09-12 RX ORDER — MUPIROCIN CALCIUM 20 MG/G
CREAM TOPICAL
Qty: 15 G | Refills: 0 | Status: SHIPPED | OUTPATIENT
Start: 2022-09-12 | End: 2022-10-12

## 2022-09-12 ASSESSMENT — ENCOUNTER SYMPTOMS
VOMITING: 0
NAUSEA: 0
ABDOMINAL PAIN: 0

## 2022-09-12 NOTE — PROGRESS NOTES
Chandler Andrade (:  1940) is a 80 y.o. female,Established patient, here for evaluation of the following chief complaint(s):  Laceration (Patient states that she had slipped and cut her right leg while getting into the car last Thursday. . want to make sure there is no infection )         ASSESSMENT/PLAN:  1. Laceration of right lower extremity, initial encounter  -     mupirocin (BACTROBAN) 2 % cream; Apply 2 times daily. , Disp-15 g, R-0, Normal  Too late to suture. No evidence of infection. Advised x ray to rule out fracture. Patient declined at this time. Will give mupirocin although wound looks clean currently. Continue to dress with non stick pad (given in office)+ wrap. Gently clean with gentle soap and warm water. Discussed alarm signs and symptoms and what would prompt return or a trip to ER. Discussed return and ER precautions. Patient and or parent verbalized understanding    Return if symptoms worsen or fail to improve. Subjective   SUBJECTIVE/OBJECTIVE:  R shin injury  Hit her shin off part of a wheelchair when getting into car Thursday  Has been putting a dressing + neosporin on it  First encounter  Able to walk in it just fine. L sided BKA  No fever, no calf pain  Has not been talkin any OTC medication-does report it is tender/throbbing      Review of Systems   Constitutional:  Negative for chills and fever. Gastrointestinal:  Negative for abdominal pain, nausea and vomiting. Skin:  Positive for wound. Negative for rash. Neurological:  Negative for dizziness and light-headedness. Objective   Physical Exam  Vitals reviewed. Constitutional:       General: She is not in acute distress. HENT:      Head: Normocephalic and atraumatic. Eyes:      Extraocular Movements: Extraocular movements intact. Conjunctiva/sclera: Conjunctivae normal.   Cardiovascular:      Rate and Rhythm: Normal rate and regular rhythm.    Pulmonary:      Effort: Pulmonary effort is normal. Comments: Using nasal cannula  Skin:     Findings: Laceration present. Comments: Approx 5x1 laceration to the R tibia area. Surrounding bruising. No extensive erytyema, swelling, induration, abscress or drainage. The area is tender. Neurological:      General: No focal deficit present. Mental Status: She is alert and oriented to person, place, and time. An electronic signature was used to authenticate this note.     --Louellen Dance, MD

## 2022-09-16 ENCOUNTER — TELEPHONE (OUTPATIENT)
Dept: FAMILY MEDICINE CLINIC | Age: 82
End: 2022-09-16

## 2022-09-16 ENCOUNTER — OFFICE VISIT (OUTPATIENT)
Dept: FAMILY MEDICINE CLINIC | Age: 82
End: 2022-09-16
Payer: MEDICARE

## 2022-09-16 VITALS
WEIGHT: 171 LBS | RESPIRATION RATE: 17 BRPM | OXYGEN SATURATION: 93 % | DIASTOLIC BLOOD PRESSURE: 72 MMHG | SYSTOLIC BLOOD PRESSURE: 130 MMHG | HEIGHT: 65 IN | HEART RATE: 97 BPM | BODY MASS INDEX: 28.49 KG/M2 | TEMPERATURE: 97.3 F

## 2022-09-16 DIAGNOSIS — L03.115 CELLULITIS OF RIGHT LOWER EXTREMITY: ICD-10-CM

## 2022-09-16 DIAGNOSIS — S89.91XD INJURY OF RIGHT LOWER EXTREMITY, SUBSEQUENT ENCOUNTER: Primary | ICD-10-CM

## 2022-09-16 DIAGNOSIS — Z86.718 HISTORY OF DVT (DEEP VEIN THROMBOSIS): ICD-10-CM

## 2022-09-16 PROCEDURE — 99213 OFFICE O/P EST LOW 20 MIN: CPT | Performed by: INTERNAL MEDICINE

## 2022-09-16 PROCEDURE — 1123F ACP DISCUSS/DSCN MKR DOCD: CPT | Performed by: INTERNAL MEDICINE

## 2022-09-16 RX ORDER — DOXYCYCLINE HYCLATE 100 MG
100 TABLET ORAL 2 TIMES DAILY
Qty: 20 TABLET | Refills: 0 | Status: SHIPPED | OUTPATIENT
Start: 2022-09-16 | End: 2022-09-26

## 2022-09-16 NOTE — TELEPHONE ENCOUNTER
Patient called because the skin around her lower leg wound has some redness. She said that the red skin does feel warmer. Redness is only around the wound and does not go around the leg. Denies fever. She is using the Bactroban that Dr Rufina Jeffers prescribed on 9/12 but was instructed to call if she developed redness. I explained that the wound may be infected and it would be best to come into the office to have it looked at. Her  Piero Pedersen got on the phone and said that they skin looks pink and not red. Said that Ron Dunlap gets \"anxious\" about these things since her other leg was amputated. He is asking if she can send a picture of her lower leg. MyChart is not set up. Picture was sent to a cell phone. Dr Octavia Antonio and Amberly Raman reviewed and agreed that it does look infected and she needs to come in.

## 2022-09-16 NOTE — TELEPHONE ENCOUNTER
I do not see a tetanus on this lady's chart. She was seen for wound infection that occurred in the past week plus. If she has not had a tetanus shot in the last 10 years needs to have one done and should come back in. I put an order in for Tdap.

## 2022-09-17 ASSESSMENT — ENCOUNTER SYMPTOMS
COLOR CHANGE: 1
ABDOMINAL PAIN: 0
SHORTNESS OF BREATH: 1

## 2022-09-18 NOTE — PROGRESS NOTES
[address] [place]     22  Cierra Vidal : 1940 Sex: female  Age: 80 y.o. Chief Complaint   Patient presents with    Leg Injury     Laceration on the right leg, redness around the area        HPI    Patient presents to express care today in wheelchair accompanied by her  stating that back on the eighth of this month she was in seeing her PCP and when she left to go to the parking lot in the wheelchair she ended up catching her leg on the leg of the wheelchair lacerating right lower anterior leg. They did not come back in to be checked. She did come back into see her PCP 4 days ago and he did place her on Bactroban cream which they have been using. They are back in today concerned about infection because of increasing redness around the wound and tenderness to touch. They have just moved to the area recently and just started seeing their PCP. He is awaiting records from previous physician currently. Denies fever or chills. Denies drainage. Review of Systems   Constitutional:  Negative for chills and fever. HENT: Negative. Respiratory:  Positive for shortness of breath. History of COPD-on oxygen Trelegy and duo nebs   Cardiovascular:  Negative for chest pain and leg swelling. Gastrointestinal:  Negative for abdominal pain. Genitourinary:  Negative for dysuria and frequency. Skin:  Positive for color change and wound. Neurological:  Negative for weakness and numbness. REST OF PERTINENT ROS GONE OVER AND WAS NEGATIVE. Current Outpatient Medications:     doxycycline hyclate (VIBRA-TABS) 100 MG tablet, Take 1 tablet by mouth 2 times daily for 10 days, Disp: 20 tablet, Rfl: 0    mupirocin (BACTROBAN) 2 % cream, Apply 2 times daily. , Disp: 15 g, Rfl: 0    acetaZOLAMIDE (DIAMOX) 250 MG tablet, 250 mg daily, Disp: , Rfl:     ALPRAZolam (XANAX) 0.25 MG tablet, Take 0.25 mg by mouth in the morning, at noon, and at bedtime. , Disp: , Rfl:     amLODIPine (NORVASC) 5 MG tablet, 5 mg daily, Disp: , Rfl:     [place] [employer] 100-62.5-25 MCG/INH AEPB, 1 puff daily, Disp: , Rfl:     ipratropium-albuterol (DUONEB) 0.5-2.5 (3) MG/3ML SOLN nebulizer solution, Inhale 1 vial into the lungs 3 times daily, Disp: , Rfl:     losartan (COZAAR) 100 MG tablet, Take 100 mg by mouth daily, Disp: , Rfl:     pantoprazole (PROTONIX) 40 MG tablet, Take 40 mg by mouth daily, Disp: , Rfl:     OXYGEN, Inhale into the lungs, Disp: , Rfl:     ferrous sulfate (IRON 325) 325 (65 Fe) MG tablet, Take 325 mg by mouth daily (with breakfast), Disp: , Rfl:     cetirizine (ZYRTEC) 10 MG tablet, Take 10 mg by mouth daily, Disp: , Rfl:     metoprolol tartrate (LOPRESSOR) 25 MG tablet, Take 25 mg by mouth daily, Disp: , Rfl:     albuterol sulfate HFA (PROVENTIL;VENTOLIN;PROAIR) 108 (90 Base) MCG/ACT inhaler, Inhale 2 puffs into the lungs every 6 hours as needed, Disp: , Rfl:     aspirin 81 MG chewable tablet, Take 81 mg by mouth daily, Disp: , Rfl:     sertraline (ZOLOFT) 50 MG tablet, Take 1 tablet by mouth daily, Disp: 30 tablet, Rfl: 1  No Known Allergies    No past medical history on file. No past surgical history on file. No family history on file.   Social History     Socioeconomic History    Marital status: Unknown     Spouse name: Not on file    Number of children: Not on file    Years of education: Not on file    Highest education level: Not on file   Occupational History    Not on file   Tobacco Use    Smoking status: Former     Types: Cigarettes     Quit date:      Years since quittin.7    Smokeless tobacco: Never   Substance and Sexual Activity    Alcohol use: Yes     Comment: socially    Drug use: Never    Sexual activity: Not on file   Other Topics Concern    Not on file   Social History Narrative    Not on file     Social Determinants of Health     Financial Resource Strain: Low Risk     Difficulty of Paying Living Expenses: Not hard at all   Food Insecurity: No Food Insecurity    Worried About Running Out of Food in the Last Year: Never true    Ran Out of Food in the Last Year: Never true   Transportation Needs: Not on file   Physical Activity: Not on file   Stress: Not on file   Social Connections: Not on file   Intimate Partner Violence: Not on file   Housing Stability: Not on file       Vitals:    09/16/22 1015   BP: 130/72   Pulse: 97   Resp: 17   Temp: 97.3 °F (36.3 °C)   TempSrc: Temporal   SpO2: 93%   Weight: 171 lb (77.6 kg)   Height: 5' 5\" (1.651 m)       Physical Exam  Vitals and nursing note reviewed. Constitutional:       General: She is not in acute distress. Musculoskeletal:         General: Swelling, tenderness and signs of injury present. Normal range of motion. Comments: Examination to the right lower leg demonstrated on the anterior laceration in different stages of healing. Tender to palpation. Surrounding erythema. No drainage. Diminished distal pulses. No calf pain to palpation. No medial thigh pain to palpation. Negative Homans' sign. Skin:     General: Skin is warm and dry. Findings: Erythema and lesion present. Neurological:      General: No focal deficit present. Mental Status: She is alert and oriented to person, place, and time. Sensory: No sensory deficit. Motor: No weakness. Psychiatric:         Mood and Affect: Mood normal.         Behavior: Behavior normal.               Assessment and Plan:  Rhonda Dash was seen today for leg injury. Diagnoses and all orders for this visit:    Injury of right lower extremity, subsequent encounter  -     XR TIBIA FIBULA RIGHT (2 VIEWS); Future    Cellulitis of right lower extremity    History of DVT (deep vein thrombosis)    Other orders  -     doxycycline hyclate (VIBRA-TABS) 100 MG tablet; Take 1 tablet by mouth 2 times daily for 10 days    Plan: Plain film x-ray right tib-fib continue Bactroban cream.  Doxycycline. Warned of potential side effects. Probiotic.   Push fluids. Follow-up with PCP in the next 2 weeks. Notify us if not improving or problems in the interim. Return in about 2 weeks (around 9/30/2022). Seen By:  Mane Monk MD      *Document was created using voice recognition software. Note was reviewed however may contain grammatical errors.

## 2022-09-19 ENCOUNTER — OFFICE VISIT (OUTPATIENT)
Dept: FAMILY MEDICINE CLINIC | Age: 82
End: 2022-09-19
Payer: MEDICARE

## 2022-09-19 ENCOUNTER — TELEPHONE (OUTPATIENT)
Dept: FAMILY MEDICINE CLINIC | Age: 82
End: 2022-09-19

## 2022-09-19 VITALS
WEIGHT: 169 LBS | SYSTOLIC BLOOD PRESSURE: 136 MMHG | TEMPERATURE: 99.4 F | DIASTOLIC BLOOD PRESSURE: 86 MMHG | BODY MASS INDEX: 28.12 KG/M2 | HEART RATE: 74 BPM | OXYGEN SATURATION: 92 %

## 2022-09-19 DIAGNOSIS — J01.90 ACUTE BACTERIAL SINUSITIS: ICD-10-CM

## 2022-09-19 DIAGNOSIS — J44.1 COPD EXACERBATION (HCC): Primary | ICD-10-CM

## 2022-09-19 DIAGNOSIS — H61.23 BILATERAL IMPACTED CERUMEN: ICD-10-CM

## 2022-09-19 DIAGNOSIS — B96.89 ACUTE BACTERIAL SINUSITIS: ICD-10-CM

## 2022-09-19 LAB
Lab: NORMAL
PERFORMING INSTRUMENT: NORMAL
QC PASS/FAIL: NORMAL
SARS-COV-2, POC: NORMAL

## 2022-09-19 PROCEDURE — 1123F ACP DISCUSS/DSCN MKR DOCD: CPT | Performed by: STUDENT IN AN ORGANIZED HEALTH CARE EDUCATION/TRAINING PROGRAM

## 2022-09-19 PROCEDURE — 96372 THER/PROPH/DIAG INJ SC/IM: CPT | Performed by: STUDENT IN AN ORGANIZED HEALTH CARE EDUCATION/TRAINING PROGRAM

## 2022-09-19 PROCEDURE — 87426 SARSCOV CORONAVIRUS AG IA: CPT | Performed by: STUDENT IN AN ORGANIZED HEALTH CARE EDUCATION/TRAINING PROGRAM

## 2022-09-19 PROCEDURE — 99214 OFFICE O/P EST MOD 30 MIN: CPT | Performed by: STUDENT IN AN ORGANIZED HEALTH CARE EDUCATION/TRAINING PROGRAM

## 2022-09-19 RX ORDER — PREDNISONE 10 MG/1
TABLET ORAL
Qty: 13 TABLET | Refills: 0 | Status: SHIPPED
Start: 2022-09-19 | End: 2022-09-30

## 2022-09-19 RX ORDER — AMOXICILLIN 500 MG/1
500 CAPSULE ORAL 2 TIMES DAILY
Qty: 14 CAPSULE | Refills: 0 | Status: SHIPPED | OUTPATIENT
Start: 2022-09-19 | End: 2022-09-26

## 2022-09-19 RX ORDER — FLUTICASONE FUROATE, UMECLIDINIUM BROMIDE AND VILANTEROL TRIFENATATE 100; 62.5; 25 UG/1; UG/1; UG/1
1 POWDER RESPIRATORY (INHALATION) DAILY
Qty: 60 EACH | Refills: 1 | Status: SHIPPED
Start: 2022-09-19 | End: 2022-10-13 | Stop reason: SDUPTHER

## 2022-09-19 RX ORDER — METHYLPREDNISOLONE ACETATE 40 MG/ML
40 INJECTION, SUSPENSION INTRA-ARTICULAR; INTRALESIONAL; INTRAMUSCULAR; SOFT TISSUE ONCE
Status: COMPLETED | OUTPATIENT
Start: 2022-09-19 | End: 2022-09-19

## 2022-09-19 RX ADMIN — METHYLPREDNISOLONE ACETATE 40 MG: 40 INJECTION, SUSPENSION INTRA-ARTICULAR; INTRALESIONAL; INTRAMUSCULAR; SOFT TISSUE at 13:20

## 2022-09-19 ASSESSMENT — ENCOUNTER SYMPTOMS
CHEST TIGHTNESS: 1
NAUSEA: 0
SHORTNESS OF BREATH: 1
RHINORRHEA: 0
SINUS PAIN: 1
ABDOMINAL PAIN: 0
COUGH: 0
VOMITING: 0

## 2022-09-19 NOTE — PROGRESS NOTES
Ashley Martines (:  1940) is a 80 y.o. female,Established patient, here for evaluation of the following chief complaint(s):  Ear Fullness and Shortness of Breath       ASSESSMENT/PLAN:  1. COPD exacerbation (HCC)  -     POCT COVID-19, Antigen  -     XR CHEST (2 VW); Future  -     Peggy[employer] Mariah 100-62.5-25 MCG/INH AEPB; Inhale 1 puff into the lungs daily, Disp-60 each, R-1, DAWNormal  -     methylPREDNISolone acetate (DEPO-MEDROL) injection 40 mg; 40 mg, IntraMUSCular, ONCE, 1 dose, On Mon 22 at 1345  -     predniSONE (DELTASONE) 10 MG tablet; Steroid taper  40mg 2 days= 8 tablets, 20mg 2 days= 4 tablets, 10mg 1 day= 1 tablet, Disp-13 tablet, R-0Normal  2. Acute bacterial sinusitis  -     amoxicillin (AMOXIL) 500 MG capsule; Take 1 capsule by mouth 2 times daily for 7 days, Disp-14 capsule, R-0Normal  3. Bilateral impacted cerumen  -     carbamide peroxide (DEBROX) 6.5 % otic solution; Place 5 drops into the left ear 2 times daily, Disp-15 mL, R-0Normal  Already on doxy-continue for her leg + COPD exacerbation  Add steroids  Impacted cerumen, R side removed with curette, debrox rx for the L side. Low threshold to go to ER if breathing does not improve  Negative COVID  CXR to rule out pneumonia  Adding amoxil as I believe she has some sinusitis as well and would benefit from some additional coverage given the state of her lungs  Needs her trelegy refilled    Return if symptoms worsen or fail to improve. Subjective   SUBJECTIVE/OBJECTIVE:  Ear pain, and SOB  -she uses 6L of oxygen at home-less while out. This is her baseline. Saturating normally on 5l right now.  -cough not much different from baseline  -feels more SOB than usual  -no fevers recorded at home  -no apetitie  -R ear fulness  -feels some congestion in her head and chest-pressure descibed in the head  -denies chest pain      Review of Systems   Constitutional:  Negative for chills and fever.    HENT:  Positive for ear pain and sinus pain. Negative for congestion and rhinorrhea. Respiratory:  Positive for chest tightness and shortness of breath. Negative for cough. Cardiovascular:  Negative for chest pain and leg swelling. Gastrointestinal:  Negative for abdominal pain, nausea and vomiting. Genitourinary:  Negative for dysuria and hematuria. Musculoskeletal:  Negative for arthralgias and myalgias. Skin:  Negative for rash and wound. Neurological:  Negative for dizziness and light-headedness. Objective   Physical Exam  Vitals reviewed. Constitutional:       General: She is not in acute distress. HENT:      Head: Normocephalic and atraumatic. Right Ear: Tympanic membrane normal. There is impacted cerumen. Left Ear: There is impacted cerumen. Mouth/Throat:      Pharynx: Posterior oropharyngeal erythema present. Eyes:      Extraocular Movements: Extraocular movements intact. Conjunctiva/sclera: Conjunctivae normal.   Cardiovascular:      Rate and Rhythm: Normal rate and regular rhythm. Pulmonary:      Effort: Pulmonary effort is normal.      Breath sounds: No wheezing. Comments: Decreased breath sounds in all lung fields  Musculoskeletal:         General: No tenderness or deformity. Right lower leg: No edema. Comments: Wrapped wound noted on R leg. BKA on L side   Neurological:      General: No focal deficit present. Mental Status: She is alert and oriented to person, place, and time. An electronic signature was used to authenticate this note.     --Shawanda Melgar MD

## 2022-09-20 ENCOUNTER — TELEPHONE (OUTPATIENT)
Dept: FAMILY MEDICINE CLINIC | Age: 82
End: 2022-09-20

## 2022-09-20 NOTE — TELEPHONE ENCOUNTER
----- Message from Padma Darling sent at 9/20/2022 12:40 PM EDT -----  Subject: Results Request    QUESTIONS  Results: Chest Xray; Ordered by: Patrick Fragoso   Date Performed: 2022-09-19  ---------------------------------------------------------------------------  --------------  Mara Bean INFO    [phone]; OK to leave message on voicemail  ---------------------------------------------------------------------------  --------------

## 2022-09-30 ENCOUNTER — OFFICE VISIT (OUTPATIENT)
Dept: FAMILY MEDICINE CLINIC | Age: 82
End: 2022-09-30
Payer: MEDICARE

## 2022-09-30 ENCOUNTER — TELEPHONE (OUTPATIENT)
Dept: VASCULAR SURGERY | Age: 82
End: 2022-09-30

## 2022-09-30 ENCOUNTER — TELEPHONE (OUTPATIENT)
Dept: FAMILY MEDICINE CLINIC | Age: 82
End: 2022-09-30

## 2022-09-30 VITALS
OXYGEN SATURATION: 96 % | TEMPERATURE: 97.9 F | HEART RATE: 71 BPM | SYSTOLIC BLOOD PRESSURE: 144 MMHG | DIASTOLIC BLOOD PRESSURE: 60 MMHG

## 2022-09-30 DIAGNOSIS — R06.02 SHORTNESS OF BREATH: Primary | ICD-10-CM

## 2022-09-30 DIAGNOSIS — Z86.718 HISTORY OF DVT (DEEP VEIN THROMBOSIS): ICD-10-CM

## 2022-09-30 DIAGNOSIS — Z89.512 HISTORY OF BELOW-KNEE AMPUTATION OF LEFT LOWER EXTREMITY (HCC): ICD-10-CM

## 2022-09-30 DIAGNOSIS — R94.31 EKG ABNORMALITIES: ICD-10-CM

## 2022-09-30 DIAGNOSIS — J44.9 END STAGE COPD (HCC): ICD-10-CM

## 2022-09-30 DIAGNOSIS — R94.31 EKG ABNORMALITIES: Primary | ICD-10-CM

## 2022-09-30 PROCEDURE — 93000 ELECTROCARDIOGRAM COMPLETE: CPT | Performed by: STUDENT IN AN ORGANIZED HEALTH CARE EDUCATION/TRAINING PROGRAM

## 2022-09-30 PROCEDURE — 99214 OFFICE O/P EST MOD 30 MIN: CPT | Performed by: STUDENT IN AN ORGANIZED HEALTH CARE EDUCATION/TRAINING PROGRAM

## 2022-09-30 PROCEDURE — 1123F ACP DISCUSS/DSCN MKR DOCD: CPT | Performed by: STUDENT IN AN ORGANIZED HEALTH CARE EDUCATION/TRAINING PROGRAM

## 2022-09-30 RX ORDER — FLUTICASONE PROPIONATE 50 MCG
2 SPRAY, SUSPENSION (ML) NASAL DAILY
Qty: 48 G | Refills: 1 | Status: SHIPPED | OUTPATIENT
Start: 2022-09-30

## 2022-09-30 RX ORDER — AZELASTINE 1 MG/ML
1 SPRAY, METERED NASAL 2 TIMES DAILY
Qty: 60 ML | Refills: 1 | Status: SHIPPED | OUTPATIENT
Start: 2022-09-30

## 2022-09-30 RX ORDER — GUAIFENESIN 600 MG/1
600 TABLET, EXTENDED RELEASE ORAL 2 TIMES DAILY
Qty: 30 TABLET | Refills: 0 | Status: SHIPPED | OUTPATIENT
Start: 2022-09-30 | End: 2022-10-15

## 2022-09-30 ASSESSMENT — ENCOUNTER SYMPTOMS
WHEEZING: 0
NAUSEA: 0
SHORTNESS OF BREATH: 1
RHINORRHEA: 0
VOMITING: 0
COUGH: 0
ABDOMINAL PAIN: 0

## 2022-09-30 NOTE — PROGRESS NOTES
[facility]  Office Progress Note  Dr. Santhosh Mcknight      Patient:  Kathya Bender 80 y.o. female     Date of Service: 9/30/22      Chief complaint:   Chief Complaint   Patient presents with    Wound Check     Rt leg, 2 week follow , asking about Tdap. .     Other     Asking about pulmonary and vascular referrals    Sinus Problem     Follow up         History of Present Illness   The patient is a 80 y.o. female  here to follow up of their cellulitis    Cellulitis-improving    Sinus issues-was treated with amoxil. Was also on doxy for cellulitis. Feels like she is still very congested and ears are popping. Had cerumen impaction and has been using debrox  Feels like congestion is making it difficult for her oxygen delivery to be sufficient. Feeling some extra anxiety. She denies chest pain or palpitations. She is more SOB than usual but has not had to increase her home oxygen therapy and has been saturating normally. She uses her albuterol nebulizer BID + trelegy. Does not feel like she improved when using steroids and they added to her anxiety. Past Medical History:  No past medical history on file. Review of Systems:   Review of Systems   Constitutional:  Negative for chills and fever. HENT:  Negative for congestion and rhinorrhea. Respiratory:  Positive for shortness of breath. Negative for cough and wheezing. Cardiovascular:  Negative for chest pain and leg swelling. Gastrointestinal:  Negative for abdominal pain, nausea and vomiting. Genitourinary:  Negative for dysuria and hematuria. Musculoskeletal:  Negative for arthralgias and myalgias. Skin:  Negative for rash and wound. Neurological:  Negative for dizziness and light-headedness. Physical Exam   Vitals: BP (!) 144/60   Pulse 71   Temp 97.9 °F (36.6 °C)   SpO2 96% Comment: 4 liters  Physical Exam    General:  Well developed, well nourished, well groomed. No apparent distress.   HEENT:  Normocephalic, atraumatic. No scleral icterus. No conjunctival injection. No nasal discharge. Heart:  irregular rhythm, no murmurs, gallops, or rubs  Lungs:  Decreased breath sounds bilaterally  Abdomen: Bowel sounds present, soft, nontender, no masses, no organomegaly, no peritoneal signs  Extremities:  No clubbing, cyanosis, or edema. L BKA noted. No erythema or swelling to the R leg. Scabbing noted. Neuro:  Alert and oriented x3, no focal deficits      Assessment and Plan     EKG was obtained and interpreted by me. Rate is irregular but appears to be PAC or some type of ectopic beat, not afib/flutter that is read on machine. P waves are present,  Considered using additional steroids + azithromycin, given anxiety, irregular ekg lack of improvement with last dose and normal oxygenation will hold off  I think improving her sinus congestion may make oxygen delivery somewhat easier and will use micinex, astelin and flonase. Referral placed to pulmonology  Spent a significant amount of time discussing indications to go to the emergency room-mostly resolving around shortness of breath worsening or pulse ox dropping on her usual oxygen therapy. I did place a referral to vascular today. Have not gotten old records yet but she reports poor circulation in her remaining leg    1. Shortness of breath    - EKG 12 Lead - Clinic Performed; Future  - EKG 12 Lead - Clinic Performed  - guaiFENesin (MUCINEX) 600 MG extended release tablet; Take 1 tablet by mouth 2 times daily for 15 days  Dispense: 30 tablet; Refill: 0  - fluticasone (FLONASE) 50 MCG/ACT nasal spray; 2 sprays by Each Nostril route daily  Dispense: 48 g; Refill: 1  - azelastine (ASTELIN) 0.1 % nasal spray; 1 spray by Nasal route 2 times daily Use in each nostril as directed  Dispense: 60 mL; Refill: 1    2. History of DVT (deep vein thrombosis)    - Emma Zafar MD, Vascular Surgery, [place]    3.  History of below-knee amputation of left lower extremity [facility])    - Corinne Tejeda MD, Vascular Surgery, [place]    4. End stage COPD ([facility]    - [address], Julio C DO Mikael, Pulmonary, [address] [place] [address] regarding above diagnosis, including possible risks and complications,  especially if left uncontrolled. Counseled regarding the possible side effects, risks, benefits and alternatives to treatment;patient and/or guardian verbalizes understanding, agrees, feels comfortable with and wishes to proceed with above treatment plan. Call or go to ED immediately if symptoms worsen or persist. Advised patient to call with any new medication issues, and, as applicable, read all Rx info from pharmacy to assure aware of all possible risks and side effects of medicationbefore taking. Patient and/or guardian given opportunity to ask questions/raise concerns. The patient verbalized comfort and understanding ofinstructions. Return to Office: No follow-ups on file. Medication List:    Current Outpatient Medications   Medication Sig Dispense Refill    guaiFENesin (MUCINEX) 600 MG extended release tablet Take 1 tablet by mouth 2 times daily for 15 days 30 tablet 0    fluticasone (FLONASE) 50 MCG/ACT nasal spray 2 sprays by Each Nostril route daily 48 g 1    azelastine (ASTELIN) 0.1 % nasal spray 1 spray by Nasal route 2 times daily Use in each nostril as directed 60 mL 1    mupirocin (BACTROBAN) 2 % ointment       carbamide peroxide (DEBROX) 6.5 % otic solution Place 5 drops into the left ear 2 times daily 15 mL 0    TRELEGY ELLIPTA 100-62.5-25 MCG/INH AEPB Inhale 1 puff into the lungs daily 60 each 1    mupirocin (BACTROBAN) 2 % cream Apply 2 times daily. 15 g 0    acetaZOLAMIDE (DIAMOX) 250 MG tablet 250 mg daily      ALPRAZolam (XANAX) 0.25 MG tablet Take 0.25 mg by mouth in the morning, at noon, and at bedtime.       amLODIPine (NORVASC) 5 MG tablet 5 mg daily      ipratropium-albuterol (DUONEB) 0.5-2.5 (3) MG/3ML SOLN nebulizer solution Inhale 1 vial into the lungs 3 times daily      losartan (COZAAR) 100 MG tablet Take 100 mg by mouth daily      pantoprazole (PROTONIX) 40 MG tablet Take 40 mg by mouth daily      OXYGEN Inhale into the lungs      ferrous sulfate (IRON 325) 325 (65 Fe) MG tablet Take 325 mg by mouth daily (with breakfast)      cetirizine (ZYRTEC) 10 MG tablet Take 10 mg by mouth daily      metoprolol tartrate (LOPRESSOR) 25 MG tablet Take 25 mg by mouth daily      albuterol sulfate HFA (PROVENTIL;VENTOLIN;PROAIR) 108 (90 Base) MCG/ACT inhaler Inhale 2 puffs into the lungs every 6 hours as needed      aspirin 81 MG chewable tablet Take 81 mg by mouth daily      sertraline (ZOLOFT) 50 MG tablet Take 1 tablet by mouth daily 30 tablet 1     No current facility-administered medications for this visit. Isac Ahuja MD     This document may have been prepared at least partially through the use of voice recognition software. Although effort is taken to assure the accuracy ofthis document, it is possible that grammatical, syntax, or spelling errors may occur.

## 2022-10-03 PROBLEM — I73.9 PERIPHERAL ARTERIAL DISEASE (HCC): Status: ACTIVE | Noted: 2022-10-03

## 2022-10-05 ENCOUNTER — OFFICE VISIT (OUTPATIENT)
Dept: CARDIOLOGY CLINIC | Age: 82
End: 2022-10-05
Payer: MEDICARE

## 2022-10-05 VITALS
HEIGHT: 65 IN | OXYGEN SATURATION: 90 % | RESPIRATION RATE: 16 BRPM | HEART RATE: 77 BPM | DIASTOLIC BLOOD PRESSURE: 76 MMHG | SYSTOLIC BLOOD PRESSURE: 138 MMHG | BODY MASS INDEX: 27.49 KG/M2 | WEIGHT: 165 LBS

## 2022-10-05 DIAGNOSIS — I73.9 PERIPHERAL ARTERIAL DISEASE (HCC): ICD-10-CM

## 2022-10-05 DIAGNOSIS — J44.9 CHRONIC OBSTRUCTIVE PULMONARY DISEASE, UNSPECIFIED COPD TYPE (HCC): Primary | ICD-10-CM

## 2022-10-05 DIAGNOSIS — E78.00 PURE HYPERCHOLESTEROLEMIA: ICD-10-CM

## 2022-10-05 DIAGNOSIS — J96.11 CHRONIC RESPIRATORY FAILURE WITH HYPOXIA (HCC): ICD-10-CM

## 2022-10-05 DIAGNOSIS — I10 PRIMARY HYPERTENSION: ICD-10-CM

## 2022-10-05 DIAGNOSIS — I25.10 CORONARY ARTERY DISEASE INVOLVING NATIVE CORONARY ARTERY OF NATIVE HEART WITHOUT ANGINA PECTORIS: ICD-10-CM

## 2022-10-05 PROCEDURE — 99204 OFFICE O/P NEW MOD 45 MIN: CPT | Performed by: INTERNAL MEDICINE

## 2022-10-05 PROCEDURE — 1123F ACP DISCUSS/DSCN MKR DOCD: CPT | Performed by: INTERNAL MEDICINE

## 2022-10-05 PROCEDURE — 93000 ELECTROCARDIOGRAM COMPLETE: CPT | Performed by: INTERNAL MEDICINE

## 2022-10-05 RX ORDER — CITALOPRAM 20 MG/1
20 TABLET ORAL DAILY
COMMUNITY

## 2022-10-05 RX ORDER — MULTIVIT-MIN/IRON/FOLIC ACID/K 18-600-40
CAPSULE ORAL DAILY
COMMUNITY

## 2022-10-05 NOTE — PROGRESS NOTES
OUTPATIENT CARDIOLOGY CONSULT    Name: Ehsan Shirley    Age: 80 y.o. Date of Service: 10/5/2022    Reason for Consultation: Chronic obstructive lung disease, chronic hypoxic respiratory failure, coronary atherosclerosis, peripheral arterial disease, hypertension    Referring Physician: Do Corbett MD    History of Present Illness: The patient is a 81-year-old white female with diffuse atherosclerotic vascular disease previously maintained in follow-up at [facility]. She has a known history of diffuse atherosclerotic vascular disease including coronary atherosclerosis with coronary angiography in November, 2014 demonstrating angiographically insignificant disease of the right coronary artery and a 70% stenosis of her mid circumflex with normal left ventricular systolic function with most recent additional objective assessment that of an echocardiogram in 2016 demonstrating a normal-sized left ventricular chamber with normal left ventricular systolic function and no significant valvular abnormalities. She additionally has a history of peripheral arterial disease with a left below-knee amputation in addition to that of chronic obstructive lung disease from prior tobacco consumption with chronic hypoxic respiratory failure in addition to that of hypertension hyperlipidemia. She is extremely sedentary and essentially wheelchair dependent with functional capabilities of approximately 4 METs and denies present symptoms of anginal-like chest discomfort or other ischemic equivalents, decompensated left ventricular systolic dysfunction or volume overload nor arrhythmia related manifestations. At the time of a recent general medical assessment, apparent irregularities were noted and her pulse rate with a reported electrocardiogram not presently available for review demonstrating suggestive evidence of supraventricular ectopy by report. Review of Systems:    The remainder of a complete multisystem review including consitutional, central nervous, respiratory, circulatory, gastrointestinal, genitourinary, endocrinologic, hematologic, musculoskeletal and psychiatric are negative.     Past Medical History:  Past Medical History:   Diagnosis Date    Anxiety     COPD (chronic obstructive pulmonary disease) ([facility])     Hx of blood clots        Past Surgical History:  Past Surgical History:   Procedure Laterality Date    JOINT REPLACEMENT Right 2019    RIGHT HIP    LEG AMPUTATION BELOW KNEE Left 2017       Family History:  Family History   Problem Relation Age of Onset    Cerebral Aneurysm Mother     Prostate Cancer Father        Social History:  Social History     Socioeconomic History    Marital status: Unknown     Spouse name: Not on file    Number of children: Not on file    Years of education: Not on file    Highest education level: Not on file   Occupational History    Not on file   Tobacco Use    Smoking status: Former     Types: Cigarettes     Quit date:      Years since quittin.7    Smokeless tobacco: Never   Vaping Use    Vaping Use: Never used   Substance and Sexual Activity    Alcohol use: Yes     Comment: socially    Drug use: Never    Sexual activity: Not on file   Other Topics Concern    Not on file   Social History Narrative    Not on file     Social Determinants of Health     Financial Resource Strain: Low Risk     Difficulty of Paying Living Expenses: Not hard at all   Food Insecurity: No Food Insecurity    Worried About Running Out of Food in the Last Year: Never true    Ran Out of Food in the Last Year: Never true   Transportation Needs: Not on file   Physical Activity: Not on file   Stress: Not on file   Social Connections: Not on file   Intimate Partner Violence: Not on file   Housing Stability: Not on file       Allergies:  No Known Allergies    Current Medications:  Current Outpatient Medications   Medication Sig Dispense Refill    citalopram (Jim Chan) 20 MG tablet Take 20 mg by mouth daily      Omega-3 Fatty Acids (FISH OIL PO) Take by mouth daily      Cyanocobalamin (VITAMIN B-12 PO) Take by mouth daily      Cholecalciferol (VITAMIN D) 50 MCG (2000 UT) CAPS capsule Take by mouth daily      guaiFENesin (MUCINEX) 600 MG extended release tablet Take 1 tablet by mouth 2 times daily for 15 days 30 tablet 0    fluticasone (FLONASE) 50 MCG/ACT nasal spray 2 sprays by Each Nostril route daily 48 g 1    azelastine (ASTELIN) 0.1 % nasal spray 1 spray by Nasal route 2 times daily Use in each nostril as directed 60 mL 1    mupirocin (BACTROBAN) 2 % ointment       carbamide peroxide (DEBROX) 6.5 % otic solution Place 5 drops into the left ear 2 times daily 15 mL 0    TRELEGY ELLIPTA 100-62.5-25 MCG/INH AEPB Inhale 1 puff into the lungs daily 60 each 1    mupirocin (BACTROBAN) 2 % cream Apply 2 times daily. 15 g 0    acetaZOLAMIDE (DIAMOX) 250 MG tablet 250 mg daily      ALPRAZolam (XANAX) 0.25 MG tablet Take 0.25 mg by mouth in the morning, at noon, and at bedtime.       amLODIPine (NORVASC) 5 MG tablet 5 mg daily      losartan (COZAAR) 100 MG tablet Take 100 mg by mouth daily      pantoprazole (PROTONIX) 40 MG tablet Take 40 mg by mouth daily      OXYGEN Inhale into the lungs 6 LITERS      ferrous sulfate (IRON 325) 325 (65 Fe) MG tablet Take 325 mg by mouth Twice a Week      metoprolol tartrate (LOPRESSOR) 25 MG tablet Take 25 mg by mouth daily      albuterol sulfate HFA (PROVENTIL;VENTOLIN;PROAIR) 108 (90 Base) MCG/ACT inhaler Inhale 2 puffs into the lungs every 6 hours as needed      ipratropium-albuterol (DUONEB) 0.5-2.5 (3) MG/3ML SOLN nebulizer solution Inhale 1 vial into the lungs 3 times daily      cetirizine (ZYRTEC) 10 MG tablet Take 10 mg by mouth daily (Patient not taking: Reported on 10/5/2022)      aspirin 81 MG chewable tablet Take 81 mg by mouth daily      sertraline (ZOLOFT) 50 MG tablet Take 1 tablet by mouth daily (Patient not taking: Reported on 10/5/2022) 30 tablet 1     No current facility-administered medications for this visit. Physical Exam:  /76   Pulse 77   Resp 16   Ht 5' 5\" (1.651 m)   Wt 165 lb (74.8 kg)   SpO2 90%   BMI 27.46 kg/m²   Wt Readings from Last 3 Encounters:   10/05/22 165 lb (74.8 kg)   09/19/22 169 lb (76.7 kg)   09/16/22 171 lb (77.6 kg)     The patient is awake, alert and in no discomfort or distress. No gross musculoskeletal deformity or lymphadenopathy are present that of her left below-knee amputation. No significant skin or nail changes are present. Gross examination of head, eyes, nose and throat are negative. Jugular venous pressure is normal and no carotid bruits are present. No thyromegaly is noted. Normal respiratory effort is noted with no accessory muscle usage present. Lung fields are clear to ascultation with distant breath sounds throughout all lung field. Cardiac examination is notable for a regular rate and rhythm with no palpable thrill. No gallop rhythm or cardiac murmur are identified. A benign abdominal examination is present with no masses or organomegaly. A normal abdominal aortic pulsation is present. Intact pulses are present throughout all extremities and no peripheral edema is present. No focal neurologic deficits are present.     Laboratory Tests:  Lab Results   Component Value Date    CREATININE 0.9 08/16/2022    BUN 14 08/16/2022     08/16/2022    K 3.8 08/16/2022     08/16/2022    CO2 30 (H) 08/16/2022     No results found for: BNP  Lab Results   Component Value Date/Time    WBC 6.4 08/16/2022 10:41 AM    RBC 3.53 08/16/2022 10:41 AM    HGB 10.0 08/16/2022 10:41 AM    HCT 34.3 08/16/2022 10:41 AM    MCV 97.2 08/16/2022 10:41 AM    MCH 28.3 08/16/2022 10:41 AM    MCHC 29.2 08/16/2022 10:41 AM    RDW 14.4 08/16/2022 10:41 AM     08/16/2022 10:41 AM    MPV 8.5 08/16/2022 10:41 AM     No results for input(s): ALKPHOS, ALT, AST, PROT, BILITOT, BILIDIR, LABALBU in the last 72 hours. Lab Results   Component Value Date/Time    MG 2.5 08/16/2022 10:41 AM     Lab Results   Component Value Date/Time    PROTIME 10.8 08/16/2022 10:41 AM    INR 1.0 08/16/2022 10:41 AM     No results found for: TSH  No components found for: CHLPL  No results found for: TRIG  No results found for: HDL  No results found for: [facility]    Cardiac Tests:  ECG: A resting electrocardiogram demonstrates evidence of sinus rhythm with occasional supraventricular ectopy with left atrial enlargement and nonspecific ST changes      ASSESSMENT / PLAN: On a clinical basis, the patient presently appears compensated from a cardiovascular standpoint in the face of her diffuse atherosclerotic vascular disease and presently have not altered her existing medical regimen would recommend consideration of the alteration of her antiplatelet therapy from that of aspirin to that of clopidogrel to provide long-term benefit both to her coronary and peripheral circulation in addition to that of the initiation of high-dose atorvastatin or rosuvastatin to reduce risk of atherosclerotic progression. Presently, no additional cardiovascular assessment appears indicated with ongoing needs of symptom monitoring in addition to that of aggressive risk factor modification of blood pressure and serum lipids with goals of maintaining LDL cholesterol levels below 70 mg/dL to appropriate reduce risk of future atherosclerotic development. I will present return her to your care would happily reassess her in the future should additional cardiovascular difficulties or concerns arise. Follow-up office visit as needed should additional cardiovascular difficulties or concerns arise. Thank you for allowing me to participate in your patient's care. Please feel free to contact me if you have any questions or concerns. Note: This report was completed utilizing a computerized voice recognition software.  Every effort has been made to insure accuracy, however; inadvertent computerized transcription errors may be present. Freya Carcamo.  Sebastien Zhu, [address] [facility]    An electronic copy of this consult note was forwarded to Dr. Genesis Reyes

## 2022-10-06 DIAGNOSIS — F41.9 ANXIETY: Primary | ICD-10-CM

## 2022-10-06 NOTE — TELEPHONE ENCOUNTER
We discussed trying to decrease her use of xanax at previous office visits. Does she still take celexa? Has she been able to cut back on the amount of xanax she uses?

## 2022-10-06 NOTE — TELEPHONE ENCOUNTER
Last Appointment:  9/30/2022  Future Appointments   Date Time Provider Yandle Leonardoi   10/13/2022  1:00 PM Andrew Donovan MD [facility]   11/29/2022  1:00 PM Tiki Choe MD [facility]

## 2022-10-07 RX ORDER — ALPRAZOLAM 0.25 MG/1
0.25 TABLET ORAL 3 TIMES DAILY
Qty: 90 TABLET | Refills: 0 | Status: SHIPPED | OUTPATIENT
Start: 2022-10-07 | End: 2022-11-06

## 2022-10-07 NOTE — TELEPHONE ENCOUNTER
Refill sent. Will discuss more with her at next visit      Will refill currenty-has been on medication for years. Do not want any withdrawal. PDMP reviewed and consistent.  Will discuss at office visit

## 2022-10-07 NOTE — TELEPHONE ENCOUNTER
Spoke to the patient. She said she tried taking celexa and it made her too nervous. She has been able to cut back on the xanax.

## 2022-10-10 RX ORDER — IPRATROPIUM BROMIDE AND ALBUTEROL SULFATE 2.5; .5 MG/3ML; MG/3ML
1 SOLUTION RESPIRATORY (INHALATION) 3 TIMES DAILY
Qty: 360 ML | Refills: 3 | Status: SHIPPED | OUTPATIENT
Start: 2022-10-10

## 2022-10-10 NOTE — TELEPHONE ENCOUNTER
Last Appointment:  9/30/2022  Future Appointments   Date Time Provider Yandel Huynh   10/13/2022  1:00 PM [facility]   11/29/2022  1:00 PM Remigio Leslie MD [facility]

## 2022-10-13 ENCOUNTER — OFFICE VISIT (OUTPATIENT)
Dept: FAMILY MEDICINE CLINIC | Age: 82
End: 2022-10-13
Payer: MEDICARE

## 2022-10-13 VITALS
HEART RATE: 74 BPM | WEIGHT: 173 LBS | TEMPERATURE: 98.5 F | HEIGHT: 65 IN | BODY MASS INDEX: 28.82 KG/M2 | DIASTOLIC BLOOD PRESSURE: 76 MMHG | SYSTOLIC BLOOD PRESSURE: 136 MMHG | OXYGEN SATURATION: 94 %

## 2022-10-13 DIAGNOSIS — J44.1 COPD EXACERBATION (HCC): ICD-10-CM

## 2022-10-13 DIAGNOSIS — Z23 NEEDS FLU SHOT: Primary | ICD-10-CM

## 2022-10-13 DIAGNOSIS — Z00.00 INITIAL MEDICARE ANNUAL WELLNESS VISIT: ICD-10-CM

## 2022-10-13 PROCEDURE — 90694 VACC AIIV4 NO PRSRV 0.5ML IM: CPT | Performed by: STUDENT IN AN ORGANIZED HEALTH CARE EDUCATION/TRAINING PROGRAM

## 2022-10-13 PROCEDURE — G0438 PPPS, INITIAL VISIT: HCPCS | Performed by: STUDENT IN AN ORGANIZED HEALTH CARE EDUCATION/TRAINING PROGRAM

## 2022-10-13 PROCEDURE — G0008 ADMIN INFLUENZA VIRUS VAC: HCPCS | Performed by: STUDENT IN AN ORGANIZED HEALTH CARE EDUCATION/TRAINING PROGRAM

## 2022-10-13 PROCEDURE — 1123F ACP DISCUSS/DSCN MKR DOCD: CPT | Performed by: STUDENT IN AN ORGANIZED HEALTH CARE EDUCATION/TRAINING PROGRAM

## 2022-10-13 RX ORDER — ROSUVASTATIN CALCIUM 20 MG/1
20 TABLET, COATED ORAL NIGHTLY
Qty: 30 TABLET | Refills: 3 | Status: SHIPPED | OUTPATIENT
Start: 2022-10-13

## 2022-10-13 RX ORDER — ALBUTEROL SULFATE 90 UG/1
2 AEROSOL, METERED RESPIRATORY (INHALATION) EVERY 6 HOURS PRN
Qty: 54 G | Refills: 3 | Status: CANCELLED | OUTPATIENT
Start: 2022-10-13

## 2022-10-13 RX ORDER — ALBUTEROL SULFATE 90 UG/1
2 AEROSOL, METERED RESPIRATORY (INHALATION) EVERY 6 HOURS PRN
Qty: 54 G | Refills: 3 | Status: SHIPPED | OUTPATIENT
Start: 2022-10-13

## 2022-10-13 RX ORDER — FLUTICASONE FUROATE, UMECLIDINIUM BROMIDE AND VILANTEROL TRIFENATATE 100; 62.5; 25 UG/1; UG/1; UG/1
1 POWDER RESPIRATORY (INHALATION) DAILY
Qty: 180 EACH | Refills: 3 | Status: SHIPPED | OUTPATIENT
Start: 2022-10-13

## 2022-10-13 RX ORDER — ACETAZOLAMIDE 250 MG/1
250 TABLET ORAL DAILY
Qty: 30 TABLET | Refills: 3 | Status: SHIPPED | OUTPATIENT
Start: 2022-10-13

## 2022-10-13 ASSESSMENT — PATIENT HEALTH QUESTIONNAIRE - PHQ9
SUM OF ALL RESPONSES TO PHQ9 QUESTIONS 1 & 2: 0
2. FEELING DOWN, DEPRESSED OR HOPELESS: 0
SUM OF ALL RESPONSES TO PHQ QUESTIONS 1-9: 0
1. LITTLE INTEREST OR PLEASURE IN DOING THINGS: 0
SUM OF ALL RESPONSES TO PHQ QUESTIONS 1-9: 0

## 2022-10-13 ASSESSMENT — LIFESTYLE VARIABLES: HOW OFTEN DO YOU HAVE A DRINK CONTAINING ALCOHOL: NEVER

## 2022-10-13 NOTE — PROGRESS NOTES
Medicare Annual Wellness Visit    Hussain Roach is here for Medicare AWV and Sinus Problem (Follow up)    Assessment & Plan   Needs flu shot  -     Influenza, FLUAD, (age 72 y+), IM, Preservative Free, 0.5 mL  COPD exacerbation ([facility] [address]  -     Eloy Jessie 100-62.5-25 MCG/INH AEPB; Inhale 1 puff into the lungs daily, Disp-180 each, R-3, DAWPrint  Initial Medicare annual wellness visit    Recommendations for Preventive Services Due: see orders and patient instructions/AVS.  Recommended screening schedule for the next 5-10 years is provided to the patient in written form: see Patient Instructions/AVS.     Return for Medicare Annual Wellness Visit in 1 year. Subjective   The following acute and/or chronic problems were also addressed today:  Recent appointment with cardiology. Recommended starting statin and switching aspirin to plavix. She is agreeable to the changes. Also talked about her xanax/alprazolam use. Has decreased from 1 mg TID to 2.5 mg TID over the past year. Discussed that I will continue that dose for now but It does increase her risk for falls and raises the risk of respiratory depression. Given the vast amount of meds, specialists and chronic issues I'm going to place a referral to care coordination. Patient's complete Health Risk Assessment and screening values have been reviewed and are found in Flowsheets. The following problems were reviewed today and where indicated follow up appointments were made and/or referrals ordered.     Positive Risk Factor Screenings with Interventions:    Fall Risk:  Do you feel unsteady or are you worried about falling? : (!) yes  2 or more falls in past year?: no  Fall with injury in past year?: no   Fall Risk Interventions:    Home safety tips provided            General Health and ACP:  General  In general, how would you say your health is?: Good  In the past 7 days, have you experienced any of the following: New or Increased Pain, New or Increased Fatigue, Loneliness, Social Isolation, Stress or Anger?: No  Do you get the social and emotional support that you need?: Yes  Do you have a Living Will?: Yes    Advance Directives       Power of [address] Will ACP-Advance Directive ACP-Power of     Not on File Not on File Not on File Not on File        General Health Risk Interventions:  Advised to bring in living will    Health Habits/Nutrition:  Physical Activity: Inactive    Days of Exercise per Week: 0 days    Minutes of Exercise per Session: 0 min     Have you lost any weight without trying in the past 3 months?: No  Body mass index: (!) 28.79  Have you seen the dentist within the past year?: (!) No  Health Habits/Nutrition Interventions:  Dental exam overdue:  patient encouraged to make appointment with his/her dentist    Hearing/Vision:  Do you or your family notice any trouble with your hearing that hasn't been managed with hearing aids?: No  Do you have difficulty driving, watching TV, or doing any of your daily activities because of your eyesight?: No  Have you had an eye exam within the past year?: (!) No  No results found.   Hearing/Vision Interventions:  Vision concerns:  patient encouraged to make appointment with his/her eye specialist    Safety:  Do you have working smoke detectors?: (!) No  Do you have any tripping hazards - loose or unsecured carpets or rugs?: No  Do you have any tripping hazards - clutter in doorways, halls, or stairs?: No  Do you have either shower bars, grab bars, non-slip mats or non-slip surfaces in your shower or bathtub?: Yes  Do all of your stairways have a railing or banister?: Not Applicable  Do you always fasten your seatbelt when you are in a car?: Yes  Safety Interventions:  Home safety tips provided  Referred for Care Coordination    ADLs:  In the past 7 days, did you need help from others to perform any of the following everyday activities: Eating, dressing, grooming, bathing, toileting, or walking/balance?: (!) Yes  Select all that apply: (!) Bathing  In the past 7 days, did you need help from others to take care of any of the following: Laundry, housekeeping, banking/finances, shopping, telephone use, food preparation, transportation, or taking medications?: (!) Yes  Select all that apply: [employer], Shopping, Food Preparation, Transportation  ADL Interventions:  Patient is an amputee. Lives with her  who is able to help and complete these tasks for her. Referred for Care Coordination          Objective   Vitals:    10/13/22 1259   BP: 136/76   Pulse: 74   Temp: 98.5 °F (36.9 °C)   SpO2: 94%   Weight: 173 lb (78.5 kg)   Height: 5' 5\" (1.651 m)      Body mass index is 28.79 kg/m². Physical Exam  Vitals reviewed. Constitutional:       General: She is not in acute distress. HENT:      Head: Normocephalic and atraumatic. Eyes:      Extraocular Movements: Extraocular movements intact. Conjunctiva/sclera: Conjunctivae normal.   Cardiovascular:      Rate and Rhythm: Normal rate and regular rhythm. Pulmonary:      Breath sounds: No wheezing. Comments: Wearing oxygen. Decreased breath sounds in all lung fields  Abdominal:      General: Abdomen is flat. There is no distension. Musculoskeletal:         General: No tenderness. Comments: RENU MCCOLLUM noted, Healing scab on the R shin   Neurological:      General: No focal deficit present. Mental Status: She is alert and oriented to person, place, and time. No Known Allergies  Prior to Visit Medications    Medication Sig Taking?  Authorizing Provider   acetaZOLAMIDE (DIAMOX) 250 MG tablet Take 1 tablet by mouth daily Yes Caren Cuba MD   TRELEGY ELLIPTA 100-62.5-25 MCG/INH AEPB Inhale 1 puff into the lungs daily Yes Caren Cuba MD   albuterol sulfate HFA (PROVENTIL HFA) 108 (90 Base) MCG/ACT inhaler Inhale 2 puffs into the lungs every 6 hours as needed for Wheezing or Shortness of Breath Yes Shiela Patton Predebon, MD   rosuvastatin (CRESTOR) 20 MG tablet Take 1 tablet by mouth nightly Yes Laurie Buck MD   ipratropium-albuterol (DUONEB) 0.5-2.5 (3) MG/3ML SOLN nebulizer solution Inhale 3 mLs into the lungs 3 times daily Yes Laurie Buck MD   ALPRAZolam Janith Blue) 0.25 MG tablet Take 1 tablet by mouth in the morning, at noon, and at bedtime for 30 days.  Yes Laurie Buck MD   citalopram (CELEXA) 20 MG tablet Take 20 mg by mouth daily Yes Historical Provider, MD   Omega-3 Fatty Acids (FISH OIL PO) Take by mouth daily Yes Historical Provider, MD   Cyanocobalamin (VITAMIN B-12 PO) Take by mouth daily Yes Historical Provider, MD   Cholecalciferol (VITAMIN D) 50 MCG (2000 UT) CAPS capsule Take by mouth daily Yes Historical Provider, MD   guaiFENesin (MUCINEX) 600 MG extended release tablet Take 1 tablet by mouth 2 times daily for 15 days Yes Laurie Buck MD   fluticasone (FLONASE) 50 MCG/ACT nasal spray 2 sprays by Each Nostril route daily Yes Laurie Buck MD   azelastine (ASTELIN) 0.1 % nasal spray 1 spray by Nasal route 2 times daily Use in each nostril as directed Yes Laurei Buck MD   mupirocin (BACTROBAN) 2 % ointment  Yes Historical Provider, MD   carbamide peroxide (DEBROX) 6.5 % otic solution Place 5 drops into the left ear 2 times daily Yes Laurie Buck MD   amLODIPine (NORVASC) 5 MG tablet 5 mg daily Yes Historical Provider, MD   losartan (COZAAR) 100 MG tablet Take 100 mg by mouth daily Yes Historical Provider, MD   pantoprazole (PROTONIX) 40 MG tablet Take 40 mg by mouth daily Yes Historical Provider, MD   OXYGEN Inhale into the lungs 6 LITERS Yes Historical Provider, MD   ferrous sulfate (IRON 325) 325 (65 Fe) MG tablet Take 325 mg by mouth Twice a Week Yes Historical Provider, MD   metoprolol tartrate (LOPRESSOR) 25 MG tablet Take 25 mg by mouth daily Yes Historical Provider, MD   albuterol sulfate HFA (PROVENTIL;VENTOLIN;PROAIR) 108 (90 Base) MCG/ACT inhaler Inhale 2 puffs into the lungs every 6 hours as needed Yes Historical Provider, MD Maneam (Including outside providers/suppliers regularly involved in providing care):   Patient Care Team:  Laurie Buck MD as PCP - General (Family Medicine)  Laurie Buck MD as PCP - [facility] Empaneled Provider     Reviewed and updated this visit:  Allergies  Meds

## 2022-10-13 NOTE — PATIENT INSTRUCTIONS
Personalized Preventive Plan for Isiah Albarran - 10/13/2022  Medicare offers a range of preventive health benefits. Some of the tests and screenings are paid in full while other may be subject to a deductible, co-insurance, and/or copay. Some of these benefits include a comprehensive review of your medical history including lifestyle, illnesses that may run in your family, and various assessments and screenings as appropriate. After reviewing your medical record and screening and assessments performed today your provider may have ordered immunizations, labs, imaging, and/or referrals for you. A list of these orders (if applicable) as well as your Preventive Care list are included within your After Visit Summary for your review. Other Preventive Recommendations:    A preventive eye exam performed by an eye specialist is recommended every 1-2 years to screen for glaucoma; cataracts, macular degeneration, and other eye disorders. A preventive dental visit is recommended every 6 months. Try to get at least 150 minutes of exercise per week or 10,000 steps per day on a pedometer . Order or download the FREE \"Exercise & Physical Activity: Your Everyday Guide\" from [employer]. Call 0-506.379.8505 or search [employer] online. You need 2540-1977 mg of calcium and 9217-7382 IU of vitamin D per day. It is possible to meet your calcium requirement with diet alone, but a vitamin D supplement is usually necessary to meet this goal.  When exposed to the sun, use a sunscreen that protects against both UVA and UVB radiation with an SPF of 30 or greater. Reapply every 2 to 3 hours or after sweating, drying off with a towel, or swimming. Always wear a seat belt when traveling in a car. Always wear a helmet when riding a bicycle or motorcycle.

## 2022-10-13 NOTE — Clinical Note
Nahun Mayers,  I think Stevie Le would be a great candidate for care coordination. She has end stage COPD on oxygen and needs to be following with a few specialists and I think has some trouble managing her meds.  Let me know what you need from me

## 2022-10-14 ENCOUNTER — CARE COORDINATION (OUTPATIENT)
Dept: CARE COORDINATION | Age: 82
End: 2022-10-14

## 2022-10-14 NOTE — LETTER
10/24/2022    Isiah Albarran  [address] [id number]    Dear Isiah Albarran,    I enjoyed speaking with you and wanted to send some additional information. Lexi Tilley MD and I will work together to ensure your needs are met and help you achieve your health goals. We are committed to walk with you on this journey and look forward to working with you. Please feel free to contact me with any questions or concerns. I am available by phone. You can reach me at [phone] .       In good health,     Mu Mathews RN     CC:  Copd zone tool and fall prevention education

## 2022-10-19 NOTE — CARE COORDINATION
Ambulatory Care Coordination Note  10/19/2022    ACC: Creta Gaucher, RN  ACM contacted Cori Tipton to offer enrollment in Care Coordination and she is agreeable. She states she lives in a private residence (apartment) with her . She uses a walker to ambulate in her home and a wheelchair for longer distances. She underwent left BKA in 2017. Her  assists Cori Tipton with tasks as necessary. She has two adult sons (Yanna [address] and [address])   She is dependent on 6L of continuous oxygen. She does not routinely exercise and becomes short of breath on exertion. She checks her pox routinely and it is currently 97%. She denies increased shortness of breath or increased cough. ACM reviewed medication list with Cori Tipton. She states she is in the process of applying for assistance with spiriva and trelogy. She is able to distinguish between her rescue inhaler and maintainance inhalers. ACM educated patient on purpose of each medication but this will need reinforced. Upcoming appointments were reviewed. Offered patient enrollment in the Remote Patient Monitoring (RPM) program for in-home monitoring: Patient declined. Plan  Send welcome letter, fall prevention education, and COPD zone tool    Check status of COPD  Reinforce medication education       Ambulatory Care Coordination Assessment    Care Coordination Protocol  Referral from Primary Care Provider: Yes  Week 1 - Initial Assessment     Do you have all of your prescriptions and are they filled?: No  Barriers to medication adherence: None  Are you able to afford your medications?: With Assistance  Medication Assistance Program: Patient assistance with pharmaceutical company  How often do you have trouble taking your medications the way you have been told to take them?: I always take them as prescribed.      Do you have Home O2 Therapy?: Yes   Oxygen Regimen: Continuous Flow - Enter rate/FIO2: 6   Method of Delivery: Nasal Cannula      Ability to seek help/take action for Emergent Urgent situations i.e. fire, crime, inclement weather or health crisis. : Independent  Ability to ambulate to restroom: Independent  Ability handle personal hygeine needs (bathing/dressing/grooming): Independent  Ability to manage Medications: Independent  Ability to prepare Food Preparation: Needs Assistance  Ability to maintain home (clean home, laundry): Needs Assistance  Ability to drive and/or has transportation: Dependent  Ability to do shopping: Needs Assistance  Ability to manage finances: Independent  Is patient able to live independently?: No           Per the Fall Risk Screening, did the patient have 2 or more falls or 1 fall with injury in the past year?: No     Frequent urination at night?: Yes  Do you use rails/bars?: Yes  Do you have a non-slip tub mat?: Yes     Are you experiencing loss of meaning?: No  Are you experiencing loss of hope and peace?: No     Thinking about your patient's physical health needs, are there any symptoms or problems (risk indicators) you are unsure about that require further investigation?: No identified areas of uncertainly or problems already being investigated   Are the patients physical health problems impacting on their mental well-being?: No identified areas of concern   Are there any problems with your patients lifestyle behaviors (alcohol, drugs, diet, exercise) that are impacting on physical or mental well-being?: No identified areas of concern   Do you have any other concerns about your patients mental well-being?  How would you rate their severity and impact on the patient?: No identified areas of concern   How would you rate their home environment in terms of safety and stability (including domestic violence, insecure housing, neighbor harassment)?: Consistently safe, supportive, stable, no identified problems   How do daily activities impact on the patient's well-being? (include current or anticipated unemployment, work, caregiving, access to transportation or other): No identified problems or perceived positive benefits   How would you rate their social network (family, work, friends)?: Good participation with social networks   How would you rate their financial resources (including ability to afford all required medical care)?: Financially secure, some resource challenges   How wells does the patient now understand their health and well-being (symptoms, signs or risk factors) and what they need to do to manage their health?: Reasonable to good understanding and already engages in managing health or is willing to undertake better management   How well do you think your patient can engage in healthcare discussions? (Barriers include language, deafness, aphasia, alcohol or drug problems, learning difficulties, concentration): Clear and open communication, no identified barriers   Do other services need to be involved to help this patient?: Other care/services in place but not sufficient   Are current services involved with this patient well-coordinated? (Include coordination with other services you are now recommendation): Required care/services missing and/or fragmented   Suggested Interventions and Community Resources   Medication Assistance Program: Completed         Set up/Review an Education Plan, Set up/Review Goals              Prior to Admission medications    Medication Sig Start Date End Date Taking?  Authorizing Provider   acetaZOLAMIDE (DIAMOX) 250 MG tablet Take 1 tablet by mouth daily 10/13/22  Yes Finesse Martinez MD   TRELEGY ELLIPTA 100-62.5-25 MCG/INH AEPB Inhale 1 puff into the lungs daily 10/13/22  Yes Finesse Martinez MD   albuterol sulfate HFA (PROVENTIL HFA) 108 (90 Base) MCG/ACT inhaler Inhale 2 puffs into the lungs every 6 hours as needed for Wheezing or Shortness of Breath 10/13/22  Yes Finesse Martinez MD   ipratropium-albuterol (DUONEB) 0.5-2.5 (3) MG/3ML SOLN nebulizer solution Inhale 3 mLs into the lungs 3 times daily 10/10/22  Yes Gaston Sahu MD   ALPRAZolam Nila Roulette) 0.25 MG tablet Take 1 tablet by mouth in the morning, at noon, and at bedtime for 30 days.  10/7/22 11/6/22 Yes Gaston Sahu MD   Omega-3 Fatty Acids (FISH OIL PO) Take by mouth daily   Yes Historical Provider, MD   Cyanocobalamin (VITAMIN B-12 PO) Take by mouth daily   Yes Historical Provider, MD   Cholecalciferol (VITAMIN D) 50 MCG (2000 UT) CAPS capsule Take by mouth daily   Yes Historical Provider, MD   fluticasone (FLONASE) 50 MCG/ACT nasal spray 2 sprays by Each Nostril route daily 9/30/22  Yes Gaston Sahu MD   azelastine (ASTELIN) 0.1 % nasal spray 1 spray by Nasal route 2 times daily Use in each nostril as directed 9/30/22  Yes Gaston Sahu MD   carbamide peroxide (DEBROX) 6.5 % otic solution Place 5 drops into the left ear 2 times daily 9/19/22 10/19/22 Yes Gaston Sahu MD   amLODIPine (NORVASC) 5 MG tablet 5 mg daily 8/4/22  Yes Historical Provider, MD   losartan (COZAAR) 100 MG tablet Take 100 mg by mouth daily 9/6/22  Yes Historical Provider, MD   pantoprazole (PROTONIX) 40 MG tablet Take 40 mg by mouth daily   Yes Historical Provider, MD   OXYGEN Inhale into the lungs 6 LITERS   Yes Historical Provider, MD   ferrous sulfate (IRON 325) 325 (65 Fe) MG tablet Take 325 mg by mouth Twice a Week   Yes Historical Provider, MD   metoprolol tartrate (LOPRESSOR) 25 MG tablet Take 25 mg by mouth daily   Yes Historical Provider, MD   albuterol sulfate HFA (PROVENTIL;VENTOLIN;PROAIR) 108 (90 Base) MCG/ACT inhaler Inhale 2 puffs into the lungs every 6 hours as needed   Yes Historical Provider, MD   rosuvastatin (CRESTOR) 20 MG tablet Take 1 tablet by mouth nightly  Patient not taking: Reported on 10/14/2022 10/13/22   Gaston Sahu MD   citalopram (CELEXA) 20 MG tablet Take 20 mg by mouth daily  Patient not taking: Reported on 10/14/2022    Historical Provider, MD   mupirocin (BACTROBAN) 2 % ointment  9/12/22 Historical Provider, MD       Future Appointments   Date Time Provider Yandel Huynh   11/22/2022 11:40 AM [facility]   11/29/2022  1:00 PM Homero Hollingsworth MD [facility]   12/15/2022  1:30 PM MD SHARONA Orellana [facility]      and   COPD Assessment    Does the patient understand envrionmental exposure?: Yes  Is the patient able to verbalize Rescue vs. Long Acting medications?: Yes  Does the patient have a nebulizer?: Yes     No patient-reported symptoms         Symptoms:     Have you had a recent diagnosis of pneumonia either by PCP or at a hospital?: No

## 2022-10-24 ENCOUNTER — CARE COORDINATION (OUTPATIENT)
Dept: CARE COORDINATION | Age: 82
End: 2022-10-24

## 2022-10-24 ASSESSMENT — ENCOUNTER SYMPTOMS
DYSPNEA ASSOCIATED WITH: EXERTION
DYSPNEA ASSOCIATED WITH: MINIMAL EXERTION

## 2022-10-24 NOTE — CARE COORDINATION
Ambulatory Care Coordination Note  10/24/2022    ACC: Alejandra Pratt RN    ACM received return phone call from Louis. She states her COPD is at baseline. She is wearing 5-6 L oxygen via nasal canula continuously. Her pox readings are checked at least daily and she reports the readings as 97-98%. She denies increased shortness of breath and cough. ACM reviewed note from patient's cardiologist visit with patient. She states she quit her baby aspirin on advice from doctor. Per note cards recommended switching from aspirin to plavix, however, Louis denies receiving prescription for plavix. ACM educated patient on antiplatelet therapy and answered all questions. Education will need reviewed with Louis. Louis also states she stopped taking crestor. She reports that when she took crestor she was unable to sleep and since stopping the issue has resolved. She is agreeable to ACM reaching out to PCP to update on above. ACM reviewed upcoming November appointments with Louis. She will establish as a new patient with pulmonology on 11/22 and with vascular surgery on 11/29. She will be attending these appointments. Offered patient enrollment in the Remote Patient Monitoring (RPM) program for in-home monitoring:  previously declined . Plan  Check status of COPD  Update PCP on stopping aspirin and crestor  Continue medication education  Check status of PAP (trelogy and spiriva)  Continue to follow for care coordination    Lab Results       None            Care Coordination Interventions    Referral from Primary Care Provider: Yes  Suggested Interventions and Community Resources  Medication Assistance Program: Completed (Comment: spiriva and trelogy)  Zone Management Tools:  (Comment: COPD Zone Tool)          Goals Addressed                   This Visit's Progress     Conditions and Symptoms   On track     I will schedule office visits, as directed by my provider.   I will keep my appointment or reschedule if I have to cancel. I will notify my provider of any barriers to my plan of care. I will follow my Zone Management tool to seek urgent or emergent care. I will notify my provider of any symptoms that indicate a worsening of my condition. Barriers: lack of education  Plan for overcoming my barriers: COPD zone tool and care coordination  Confidence: 8/10  Anticipated Goal Completion Date: 1/15/23                Prior to Admission medications    Medication Sig Start Date End Date Taking? Authorizing Provider   acetaZOLAMIDE (DIAMOX) 250 MG tablet Take 1 tablet by mouth daily 10/13/22   MD MONAE Cleveland ELLIPTA 100-62.5-25 MCG/INH AEPB Inhale 1 puff into the lungs daily 10/13/22   Jonah Bonilla MD   albuterol sulfate HFA (PROVENTIL HFA) 108 (90 Base) MCG/ACT inhaler Inhale 2 puffs into the lungs every 6 hours as needed for Wheezing or Shortness of Breath 10/13/22   Jonah Bonilla MD   rosuvastatin (CRESTOR) 20 MG tablet Take 1 tablet by mouth nightly  Patient not taking: Reported on 10/14/2022 10/13/22   Jonah Bonilla MD   ipratropium-albuterol (DUONEB) 0.5-2.5 (3) MG/3ML SOLN nebulizer solution Inhale 3 mLs into the lungs 3 times daily 10/10/22   Jonah Bonilla MD   ALPRAZolam Alexei [place]) 0.25 MG tablet Take 1 tablet by mouth in the morning, at noon, and at bedtime for 30 days.  10/7/22 11/6/22  Jonah Bonilla MD   citalopram (CELEXA) 20 MG tablet Take 20 mg by mouth daily  Patient not taking: Reported on 10/14/2022    Historical Provider, MD   Omega-3 Fatty Acids (FISH OIL PO) Take by mouth daily    Historical Provider, MD   Cyanocobalamin (VITAMIN B-12 PO) Take by mouth daily    Historical Provider, MD   Cholecalciferol (VITAMIN D) 50 MCG (2000 UT) CAPS capsule Take by mouth daily    Historical Provider, MD   fluticasone (FLONASE) 50 MCG/ACT nasal spray 2 sprays by Each Nostril route daily 9/30/22   Jonah Bonilla MD   azelastine (ASTELIN) 0.1 % nasal spray 1 spray by Nasal route 2 times daily Use in each nostril as directed 22   Jonah Bonilla MD   mupirocin Alex Gila) 2 % ointment  22   Historical Provider, MD   amLODIPine (NORVASC) 5 MG tablet 5 mg daily 22   Historical Provider, MD   losartan (COZAAR) 100 MG tablet Take 100 mg by mouth daily 22   Historical Provider, MD   pantoprazole (PROTONIX) 40 MG tablet Take 40 mg by mouth daily    Historical Provider, MD   OXYGEN Inhale into the lungs 6 LITERS    Historical Provider, MD   ferrous sulfate (IRON 325) 325 (65 Fe) MG tablet Take 325 mg by mouth Twice a Week    Historical Provider, MD   metoprolol tartrate (LOPRESSOR) 25 MG tablet Take 25 mg by mouth daily    Historical Provider, MD   albuterol sulfate HFA (PROVENTIL;VENTOLIN;PROAIR) 108 (90 Base) MCG/ACT inhaler Inhale 2 puffs into the lungs every 6 hours as needed    Historical Provider, MD       Future Appointments   Date Time Provider Yandel Amina   2022 11:40 AM Manjeet Santana DO [facility]   2022  1:00 PM Yonathan Johnson MD [facility]   12/15/2022  1:30 PM MD SHARONA Cleveland [facility]    and   COPD Assessment    Does the patient understand envrionmental exposure?: Yes  Is the patient able to verbalize Rescue vs. Long Acting medications?: Yes  Does the patient have a nebulizer?: Yes     No patient-reported symptoms         Symptoms:  None: Yes      Symptom course: stable  Breathlessness: minimal exertion  Increase use of rapid acting/rescue inhaled medications?: No  Self Monitoring - SaO2: Yes  Baseline SaO2 Readin

## 2022-11-07 ENCOUNTER — CARE COORDINATION (OUTPATIENT)
Dept: CARE COORDINATION | Age: 82
End: 2022-11-07

## 2022-11-07 DIAGNOSIS — F41.9 ANXIETY: ICD-10-CM

## 2022-11-07 RX ORDER — ALPRAZOLAM 0.25 MG/1
0.25 TABLET ORAL 3 TIMES DAILY
Qty: 48 TABLET | Refills: 0 | Status: SHIPPED | OUTPATIENT
Start: 2022-11-07 | End: 2022-11-23

## 2022-11-07 SDOH — ECONOMIC STABILITY: INCOME INSECURITY: IN THE LAST 12 MONTHS, WAS THERE A TIME WHEN YOU WERE NOT ABLE TO PAY THE MORTGAGE OR RENT ON TIME?: NO

## 2022-11-07 SDOH — ECONOMIC STABILITY: TRANSPORTATION INSECURITY
IN THE PAST 12 MONTHS, HAS LACK OF TRANSPORTATION KEPT YOU FROM MEETINGS, WORK, OR FROM GETTING THINGS NEEDED FOR DAILY LIVING?: NO

## 2022-11-07 SDOH — ECONOMIC STABILITY: HOUSING INSECURITY
IN THE LAST 12 MONTHS, WAS THERE A TIME WHEN YOU DID NOT HAVE A STEADY PLACE TO SLEEP OR SLEPT IN A SHELTER (INCLUDING NOW)?: NO

## 2022-11-07 SDOH — ECONOMIC STABILITY: HOUSING INSECURITY: IN THE LAST 12 MONTHS, HOW MANY PLACES HAVE YOU LIVED?: 1

## 2022-11-07 SDOH — ECONOMIC STABILITY: TRANSPORTATION INSECURITY
IN THE PAST 12 MONTHS, HAS THE LACK OF TRANSPORTATION KEPT YOU FROM MEDICAL APPOINTMENTS OR FROM GETTING MEDICATIONS?: NO

## 2022-11-07 ASSESSMENT — SOCIAL DETERMINANTS OF HEALTH (SDOH)
HOW OFTEN DO YOU GET TOGETHER WITH FRIENDS OR RELATIVES?: ONCE A WEEK
HOW OFTEN DO YOU ATTENT MEETINGS OF THE CLUB OR ORGANIZATION YOU BELONG TO?: NEVER
HOW OFTEN DO YOU ATTEND CHURCH OR RELIGIOUS SERVICES?: NEVER
IN A TYPICAL WEEK, HOW MANY TIMES DO YOU TALK ON THE PHONE WITH FAMILY, FRIENDS, OR NEIGHBORS?: THREE TIMES A WEEK
DO YOU BELONG TO ANY CLUBS OR ORGANIZATIONS SUCH AS CHURCH GROUPS UNIONS, FRATERNAL OR ATHLETIC GROUPS, OR SCHOOL GROUPS?: NO

## 2022-11-07 NOTE — TELEPHONE ENCOUNTER
Enough supply given to next in office visit. Would like to fill at visits as much as possible.     Please remind her would like her to use the minimal amount possible and if at all possible try to cut down to twice a day    PDMP reviewed

## 2022-11-07 NOTE — TELEPHONE ENCOUNTER
Stacie Lema asking for a new script for Alprazolam sent to [facility].     Last Appointment:  10/13/2022  Future Appointments   Date Time Provider Yandel Amina   11/22/2022 11:40 AM [facility]   11/29/2022  1:00 PM Jacki Mccormick MD [facility]   12/15/2022  1:30 PM Noman Stewart MD [address] [place] [address]

## 2022-11-22 ENCOUNTER — OFFICE VISIT (OUTPATIENT)
Dept: PULMONOLOGY | Age: 82
End: 2022-11-22
Payer: MEDICARE

## 2022-11-22 VITALS
RESPIRATION RATE: 24 BRPM | DIASTOLIC BLOOD PRESSURE: 84 MMHG | SYSTOLIC BLOOD PRESSURE: 188 MMHG | HEART RATE: 92 BPM | TEMPERATURE: 97.3 F | WEIGHT: 173 LBS | OXYGEN SATURATION: 86 % | HEIGHT: 65 IN | BODY MASS INDEX: 28.82 KG/M2

## 2022-11-22 DIAGNOSIS — J42 CHRONIC BRONCHITIS, UNSPECIFIED CHRONIC BRONCHITIS TYPE (HCC): Primary | ICD-10-CM

## 2022-11-22 DIAGNOSIS — J96.11 CHRONIC HYPOXEMIC RESPIRATORY FAILURE (HCC): ICD-10-CM

## 2022-11-22 DIAGNOSIS — J44.9 CHRONIC OBSTRUCTIVE PULMONARY DISEASE, UNSPECIFIED COPD TYPE (HCC): ICD-10-CM

## 2022-11-22 PROCEDURE — 1123F ACP DISCUSS/DSCN MKR DOCD: CPT | Performed by: INTERNAL MEDICINE

## 2022-11-22 PROCEDURE — 3078F DIAST BP <80 MM HG: CPT | Performed by: INTERNAL MEDICINE

## 2022-11-22 PROCEDURE — 3074F SYST BP LT 130 MM HG: CPT | Performed by: INTERNAL MEDICINE

## 2022-11-22 PROCEDURE — 99203 OFFICE O/P NEW LOW 30 MIN: CPT | Performed by: INTERNAL MEDICINE

## 2022-11-22 RX ORDER — ALBUTEROL SULFATE 90 UG/1
2 AEROSOL, METERED RESPIRATORY (INHALATION) 4 TIMES DAILY PRN
Qty: 54 G | Refills: 1 | Status: SHIPPED | OUTPATIENT
Start: 2022-11-22

## 2022-11-22 NOTE — PROGRESS NOTES
Patient to follow up with physician in 3 months. Patient will be scheduled for full PFTs. Patient will be ordered a rescue inhaler.

## 2022-11-22 NOTE — PATIENT INSTRUCTIONS
[address] [facility]  [address] [facility], [address] [place], [place] [address]isamar S  Office: [phone]      Your were seen in the office today for COPD      We  did not make changes to your medications today. Testing ordered today was Pulmonary function tests      Vaccines recommended none (up to date)              Please do not hesitate to call the office with any questions.

## 2022-11-28 ENCOUNTER — TELEPHONE (OUTPATIENT)
Dept: VASCULAR SURGERY | Age: 82
End: 2022-11-28

## 2022-11-29 ENCOUNTER — OFFICE VISIT (OUTPATIENT)
Dept: VASCULAR SURGERY | Age: 82
End: 2022-11-29
Payer: MEDICARE

## 2022-11-29 VITALS — BODY MASS INDEX: 28.82 KG/M2 | WEIGHT: 173 LBS | HEIGHT: 65 IN

## 2022-11-29 DIAGNOSIS — R20.9 COLD RIGHT FOOT: Primary | ICD-10-CM

## 2022-11-29 PROCEDURE — 1123F ACP DISCUSS/DSCN MKR DOCD: CPT | Performed by: SURGERY

## 2022-11-29 PROCEDURE — 99203 OFFICE O/P NEW LOW 30 MIN: CPT | Performed by: SURGERY

## 2022-11-29 NOTE — PROGRESS NOTES
Vascular Surgery Outpatient Consultation      Chief Complaint   Patient presents with    Circulatory Problem     New pt. Rt.lower extremity pain and swelling       Reason for Consult: Cold foot    Requesting Physician:  Dr. Daksha Barraza:                The patient is a 80 y.o. female who is referred for evaluation of cold right foot. She is accompanied by her . 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 She was evaluated by her physician who felt her foot was cold and referred her. The patient denies recent testing. Past Medical History:        Diagnosis Date    Anxiety     COPD (chronic obstructive pulmonary disease) ([facility] [address]     Hx of blood clots      Past Surgical History:        Procedure Laterality Date    JOINT REPLACEMENT Right 12/30/2019    RIGHT HIP    LEG AMPUTATION BELOW KNEE Left 01/02/2017     Current Medications:   Prior to Admission medications    Medication Sig Start Date End Date Taking?  Authorizing Provider   albuterol sulfate HFA (VENTOLIN HFA) 108 (90 Base) MCG/ACT inhaler Inhale 2 puffs into the lungs 4 times daily as needed for Wheezing 11/22/22  Yes Renetta Manzano DO   acetaZOLAMIDE (DIAMOX) 250 MG tablet Take 1 tablet by mouth daily 10/13/22  Yes Onur Elliott MD   TRELEGY ELLIPTA 100-62.5-25 MCG/INH AEPB Inhale 1 puff into the lungs daily 10/13/22  Yes Onur Elliott MD   albuterol sulfate HFA (PROVENTIL HFA) 108 (90 Base) MCG/ACT inhaler Inhale 2 puffs into the lungs every 6 hours as needed for Wheezing or Shortness of Breath 10/13/22  Yes Onur Elliott MD   rosuvastatin (CRESTOR) 20 MG tablet Take 1 tablet by mouth nightly 10/13/22  Yes Ounr Elliott MD   ipratropium-albuterol (DUONEB) 0.5-2.5 (3) MG/3ML SOLN nebulizer solution Inhale 3 mLs into the lungs 3 times daily 10/10/22  Yes Onur Elliott MD   citalopram (CELEXA) 20 MG tablet Take 20 mg by mouth daily   Yes Historical Provider, MD   Omega-3 Fatty Acids (FISH OIL PO) Take by mouth daily   Yes Historical Provider, MD   Cyanocobalamin (VITAMIN B-12 PO) Take by mouth daily   Yes Historical Provider, MD   Cholecalciferol (VITAMIN D) 50 MCG (2000 UT) CAPS capsule Take by mouth daily   Yes Historical Provider, MD   fluticasone (FLONASE) 50 MCG/ACT nasal spray 2 sprays by Each Nostril route daily 22  Yes Taylor Ferreira MD   azelastine (ASTELIN) 0.1 % nasal spray 1 spray by Nasal route 2 times daily Use in each nostril as directed 22  Yes Taylor Ferreira MD   mupirocin [place] Barnett) 2 % ointment  22  Yes Historical Provider, MD   amLODIPine (NORVASC) 5 MG tablet 5 mg daily 22  Yes Historical Provider, MD   losartan (COZAAR) 100 MG tablet Take 100 mg by mouth daily 22  Yes Historical Provider, MD   pantoprazole (PROTONIX) 40 MG tablet Take 40 mg by mouth daily   Yes Historical Provider, MD   OXYGEN Inhale into the lungs 6 LITERS   Yes Historical Provider, MD   ferrous sulfate (IRON 325) 325 (65 Fe) MG tablet Take 325 mg by mouth Twice a Week   Yes Historical Provider, MD   metoprolol tartrate (LOPRESSOR) 25 MG tablet Take 25 mg by mouth daily   Yes Historical Provider, MD   albuterol sulfate HFA (PROVENTIL;VENTOLIN;PROAIR) 108 (90 Base) MCG/ACT inhaler Inhale 2 puffs into the lungs every 6 hours as needed   Yes Historical Provider, MD     Allergies:  Patient has no known allergies.     Social History     Socioeconomic History    Marital status: Unknown     Spouse name: Not on file    Number of children: Not on file    Years of education: Not on file    Highest education level: Not on file   Occupational History    Not on file   Tobacco Use    Smoking status: Former     Types: Cigarettes     Quit date:      Years since quittin.9    Smokeless tobacco: Never   Vaping Use    Vaping Use: Never used   Substance and Sexual Activity    Alcohol use: Yes     Comment: socially    Drug use: Never    Sexual activity: Not on file   Other Topics Concern    Not on file   Social History Narrative    Not on file     Social Determinants of Health     Financial Resource Strain: Low Risk     Difficulty of Paying Living Expenses: Not hard at all   Food Insecurity: No Food Insecurity    Worried About Running Out of Food in the Last Year: Never true    [address] in the Last Year: Never true   Transportation Needs: No Transportation Needs    Lack of Transportation (Medical): No    Lack of Transportation (Non-Medical): No   Physical Activity: Inactive    Days of Exercise per Week: 0 days    Minutes of Exercise per Session: 0 min   Stress: Not on file   Social Connections: Moderately Isolated    Frequency of Communication with Friends and Family:  Three times a week    Frequency of Social Gatherings with Friends and Family: Once a week    Attends [employer] Services: Never    Active Member of Clubs or Organizations: No    Attends Club or Organization Meetings: Never    Marital Status:    Intimate Partner Violence: Not on file   Housing Stability: [address] to Pay for Housing in the Last Year: No    Number of [place] in the Last Year: 1    Unstable Housing in the Last Year: No        Family History   Problem Relation Age of Onset    Cerebral Aneurysm Mother     Prostate Cancer Father        REVIEW OF SYSTEMS (New symptoms):    Eyes:      Blurred vision:  No [x]/Yes []               Diplopia:   No [x]/Yes []               Vision loss:       No [x]/Yes []   Ears, nose, throat:             Hearing loss:    No [x]/Yes []      Vertigo:   No [x]/Yes []                       Swallowing problem:  No [x]/Yes []               Nose bleeds:   No [x]/Yes []      Voice hoarseness:  No [x]/Yes []  Respiratory:             Cough:   No [x]/Yes []      Pleuritic chest pain:  No [x]/Yes []                        Dyspnea:   No [x]/Yes []      Wheezing:   No [x]/Yes []  Cardiovascular: Angina:   No [x]/Yes []      Palpitations:   No [x]/Yes []          Claudication:    No [x]/Yes []      Leg swelling:   No [x]/Yes []  Gastrointestinal:             Nausea or vomiting:  No [x]/Yes []               Abdominal pain:  No [x]/Yes []                     Intestinal bleeding: No [x]/Yes []  Musculoskeletal:             Leg pain:   No [x]/Yes []      Back pain:   No [x]/Yes []                    Weakness:   No [x]/Yes []  Neurologic:             Numbness:   No [x]/Yes []      Paralysis:   No [x]/Yes []                       Headaches:   No [x]/Yes []  Hematologic, lymphatic:   Anemia:   No [x]/Yes []              Bleeding or bruising:  No [x]/Yes []              Fevers or chills: No [x]/Yes []  Endocrine:             Temp intolerance:   No [x]/Yes []                       Polydipsia, polyuria:  No [x]/Yes []  Skin:              Rash:    No [x]/Yes []      Ulcers:   No [x]/Yes []              Abnorm pigment: No [x]/Yes []  :              Frequency/urgency:  No [x]/Yes []      Hematuria:    No [x]/Yes []                      Incontinence:    No [x]/Yes []    PHYSICAL EXAM:  There were no vitals filed for this visit.   General Appearance: alert and oriented to person, place and time, well developed and well- nourished, in no acute distress  Skin: warm and dry, no rash or erythema  Head: normocephalic and atraumatic  Eyes: extraocular eye movements intact, conjunctivae normal  ENT: external ear and ear canal normal bilaterally, nose without deformity  Pulmonary/Chest: clear to auscultation bilaterally- no wheezes, rales or rhonchi, normal air movement, no respiratory distress  Cardiovascular: normal rate, regular rhythm, normal S1 and S2, no murmurs, no carotid bruits  Abdomen: soft, non-tender, non-distended, normal bowel sounds, no masses or organomegaly  Musculoskeletal: normal range of motion, no joint swelling, deformity or tenderness  Neurologic: no cranial nerve deficit, gait, coordination and speech normal  Extremities: no leg edema bilaterally    PULSE EXAM      Right      Left   Brachial     Radial     Femoral     Popliteal     Dorsalis Pedis 2    Posterior Tibial 2    (3=normal, 2=diminished, 1=barely palpable, 4=widened)      Problem List Items Addressed This Visit    None  Visit Diagnoses       Cold right foot    -  Primary              I reviewed with the patient and her  that the circulation to her right foot remains very good as I can feel strong pulses. I do not feel that she requires any additional vascular testing or intervention. I asked them to keep her foot warmed with a good sock and shoe. I asked him to call me with any changes. No follow-ups on file.

## 2022-12-01 ENCOUNTER — TELEPHONE (OUTPATIENT)
Dept: FAMILY MEDICINE CLINIC | Age: 82
End: 2022-12-01

## 2022-12-01 NOTE — TELEPHONE ENCOUNTER
Erica Lawrence would like to speak w/ [facility] or Dr Felicia Metzger. He would like an antibiotic called in. States that Fidelia King has a sore throat. Home Covid test was negative. Refused [facility]. States that she is in no condition to come into the office.      Please call Erica Lawrence at [phone]

## 2022-12-01 NOTE — TELEPHONE ENCOUNTER
Unable to call in antibiotic without her being evaluated. Could be flu or a myriad of other viral illnesses. Using antibiotics without a clear diagnosis could be harmful to her. Would recommend using tylenol, cough drops.  If she wants to be seen by me I can try to squeeze her in somewhere

## 2022-12-01 NOTE — TELEPHONE ENCOUNTER
Spoke with  Kristin Beyer. He said they are both fatigued, have body aches and \"just don't feel good\"   I advised that an antibiotic can't be called in and they should be evaluated. Kristin Beyer is going to talk to his wife and see if she wants to come and be seen.

## 2022-12-02 ENCOUNTER — TELEPHONE (OUTPATIENT)
Dept: FAMILY MEDICINE CLINIC | Age: 82
End: 2022-12-02

## 2022-12-02 ENCOUNTER — CARE COORDINATION (OUTPATIENT)
Dept: CARE COORDINATION | Age: 82
End: 2022-12-02

## 2022-12-02 ENCOUNTER — OFFICE VISIT (OUTPATIENT)
Dept: FAMILY MEDICINE CLINIC | Age: 82
End: 2022-12-02
Payer: MEDICARE

## 2022-12-02 VITALS
WEIGHT: 173 LBS | HEART RATE: 78 BPM | SYSTOLIC BLOOD PRESSURE: 134 MMHG | TEMPERATURE: 97.7 F | BODY MASS INDEX: 28.82 KG/M2 | DIASTOLIC BLOOD PRESSURE: 66 MMHG | HEIGHT: 65 IN | OXYGEN SATURATION: 94 %

## 2022-12-02 DIAGNOSIS — J44.9 CHRONIC OBSTRUCTIVE PULMONARY DISEASE, UNSPECIFIED COPD TYPE (HCC): ICD-10-CM

## 2022-12-02 DIAGNOSIS — R53.83 FATIGUE, UNSPECIFIED TYPE: ICD-10-CM

## 2022-12-02 DIAGNOSIS — R05.1 ACUTE COUGH: ICD-10-CM

## 2022-12-02 DIAGNOSIS — R63.0 DECREASE IN APPETITE: ICD-10-CM

## 2022-12-02 DIAGNOSIS — R06.02 SOB (SHORTNESS OF BREATH): ICD-10-CM

## 2022-12-02 DIAGNOSIS — U07.1 COVID: ICD-10-CM

## 2022-12-02 LAB
Lab: ABNORMAL
PERFORMING INSTRUMENT: ABNORMAL
QC PASS/FAIL: ABNORMAL
SARS-COV-2, POC: DETECTED

## 2022-12-02 PROCEDURE — 3074F SYST BP LT 130 MM HG: CPT | Performed by: INTERNAL MEDICINE

## 2022-12-02 PROCEDURE — 87426 SARSCOV CORONAVIRUS AG IA: CPT | Performed by: INTERNAL MEDICINE

## 2022-12-02 PROCEDURE — 99213 OFFICE O/P EST LOW 20 MIN: CPT | Performed by: INTERNAL MEDICINE

## 2022-12-02 PROCEDURE — 3078F DIAST BP <80 MM HG: CPT | Performed by: INTERNAL MEDICINE

## 2022-12-02 PROCEDURE — 1123F ACP DISCUSS/DSCN MKR DOCD: CPT | Performed by: INTERNAL MEDICINE

## 2022-12-02 RX ORDER — DOXYCYCLINE HYCLATE 100 MG
100 TABLET ORAL 2 TIMES DAILY
Qty: 20 TABLET | Refills: 0 | Status: SHIPPED
Start: 2022-12-02 | End: 2022-12-02 | Stop reason: CLARIF

## 2022-12-02 RX ORDER — PREDNISONE 10 MG/1
TABLET ORAL
Qty: 12 TABLET | Refills: 0 | Status: SHIPPED | OUTPATIENT
Start: 2022-12-02 | End: 2022-12-08

## 2022-12-02 NOTE — TELEPHONE ENCOUNTER
Roryisamar Alfredo called in,  She said I am calling the Dr back from yesterday, I need a medication to be sent to the pharmacy. Its the same Damn thing as last month. Where do expect me to go? I am up a crick, I have the runs and I am an amputee. I need help and I need help now. I advised I was going to put her on hold to talk to the Dr and she said Now you wait a damn minute. I am very upset right now. I walked back and talked to both Dr Dejuan Addison and Gerardo who advised patient has to be seen in express if she is wanting a medication. I returned to the phone and expressed Dr Would recommend her being evaluated and she can go somewhere closer to home if needed. She was upset, States she doesn't understand why he cannot send over the medication.  came on the phone and said You know what just never mind. And hung up.

## 2022-12-02 NOTE — TELEPHONE ENCOUNTER
Documenting for my own purposes, but feel free to relay to the patient if there is any more communication. Given that patient could have a variety of upper or lower respiratory illness, possible side effects of medications including diarrhea leading to or worsening dehydration (worst case scenario c.diff) I think she needs to be evaluated. The fact that she has co morbidities makes it even more important she is evaluated in person to get an accurate diagnosis so that she can receive appropriate therapy. This was relayed to the patient over the phone.     Unfortunately I can't tell if she has COVID, strep, influenza, COPD exacerbation or bacterial sinusitis or pneumonia and all of these have different treatments

## 2022-12-03 ASSESSMENT — ENCOUNTER SYMPTOMS
SINUS PRESSURE: 1
EYES NEGATIVE: 1
VOMITING: 0
SORE THROAT: 0
WHEEZING: 0
NAUSEA: 0
DIARRHEA: 0
COUGH: 1
CHEST TIGHTNESS: 0
ABDOMINAL PAIN: 0
SHORTNESS OF BREATH: 1

## 2022-12-03 NOTE — PROGRESS NOTES
[address] [place]     12/3/22  Argelia Ryan : 1940 Sex: female  Age: 80 y.o. Chief Complaint   Patient presents with    Congestion     Started     Fatigue    Other     Lack of appetite         HPI  Presents to express care today accompanied by her  and daughter with the  also being sick complaining of head and chest congestion, fatigue and lack of appetite that started about 4 days ago. States that she and her  were at their club with a group of people and thinks she might of picked something up at that time. Patient is on chronic oxygen therapy for COPD. States she has tired aches all over and has no appetite. She has been sleeping a lot more. Has a cough with some yellow sputum production as well as facial pressure and sinus drainage. She has been vaccinated to [facility]. She has been taking some over-the-counter medication since Wednesday much relief. Review of Systems   Constitutional:  Negative for chills and fever. HENT:  Positive for congestion, postnasal drip and sinus pressure. Negative for ear pain and sore throat. Eyes: Negative. Respiratory:  Positive for cough and shortness of breath. Negative for chest tightness and wheezing. Cardiovascular:  Negative for chest pain. Gastrointestinal:  Negative for abdominal pain, diarrhea, nausea and vomiting. Musculoskeletal:  Positive for myalgias. Neurological:  Negative for headaches. REST OF PERTINENT ROS GONE OVER AND WAS NEGATIVE. Current Outpatient Medications:     predniSONE (DELTASONE) 10 MG tablet, Take 3 tablets by mouth daily for 2 days, THEN 2 tablets daily for 2 days, THEN 1 tablet daily for 2 days. , Disp: 12 tablet, Rfl: 0    albuterol sulfate HFA (VENTOLIN HFA) 108 (90 Base) MCG/ACT inhaler, Inhale 2 puffs into the lungs 4 times daily as needed for Wheezing, Disp: 54 g, Rfl: 1    acetaZOLAMIDE (DIAMOX) 250 MG tablet, Take 1 tablet by mouth daily, Disp: 30 tablet, Rfl: 3    TRELEGY ELLIPTA 100-62.5-25 MCG/INH AEPB, Inhale 1 puff into the lungs daily, Disp: 180 each, Rfl: 3    albuterol sulfate HFA (PROVENTIL HFA) 108 (90 Base) MCG/ACT inhaler, Inhale 2 puffs into the lungs every 6 hours as needed for Wheezing or Shortness of Breath, Disp: 54 g, Rfl: 3    rosuvastatin (CRESTOR) 20 MG tablet, Take 1 tablet by mouth nightly, Disp: 30 tablet, Rfl: 3    ipratropium-albuterol (DUONEB) 0.5-2.5 (3) MG/3ML SOLN nebulizer solution, Inhale 3 mLs into the lungs 3 times daily, Disp: 360 mL, Rfl: 3    citalopram (CELEXA) 20 MG tablet, Take 20 mg by mouth daily, Disp: , Rfl:     Omega-3 Fatty Acids (FISH OIL PO), Take by mouth daily, Disp: , Rfl:     Cyanocobalamin (VITAMIN B-12 PO), Take by mouth daily, Disp: , Rfl:     Cholecalciferol (VITAMIN D) 50 MCG (2000 UT) CAPS capsule, Take by mouth daily, Disp: , Rfl:     fluticasone (FLONASE) 50 MCG/ACT nasal spray, 2 sprays by Each Nostril route daily, Disp: 48 g, Rfl: 1    azelastine (ASTELIN) 0.1 % nasal spray, 1 spray by Nasal route 2 times daily Use in each nostril as directed, Disp: 60 mL, Rfl: 1    mupirocin (BACTROBAN) 2 % ointment, , Disp: , Rfl:     amLODIPine (NORVASC) 5 MG tablet, 5 mg daily, Disp: , Rfl:     losartan (COZAAR) 100 MG tablet, Take 100 mg by mouth daily, Disp: , Rfl:     pantoprazole (PROTONIX) 40 MG tablet, Take 40 mg by mouth daily, Disp: , Rfl:     OXYGEN, Inhale into the lungs 6 LITERS, Disp: , Rfl:     ferrous sulfate (IRON 325) 325 (65 Fe) MG tablet, Take 325 mg by mouth Twice a Week, Disp: , Rfl:     metoprolol tartrate (LOPRESSOR) 25 MG tablet, Take 25 mg by mouth daily, Disp: , Rfl:     albuterol sulfate HFA (PROVENTIL;VENTOLIN;PROAIR) 108 (90 Base) MCG/ACT inhaler, Inhale 2 puffs into the lungs every 6 hours as needed, Disp: , Rfl:   No Known Allergies    Past Medical History:   Diagnosis Date    Anxiety     COPD (chronic obstructive pulmonary disease) (HCC)     Hx of blood clots      Past Surgical History:   Procedure Laterality Date    JOINT REPLACEMENT Right 2019    RIGHT HIP    LEG AMPUTATION BELOW KNEE Left 2017     Family History   Problem Relation Age of Onset    Cerebral Aneurysm Mother     Prostate Cancer Father      Social History     Socioeconomic History    Marital status: Unknown     Spouse name: Not on file    Number of children: Not on file    Years of education: Not on file    Highest education level: Not on file   Occupational History    Not on file   Tobacco Use    Smoking status: Former     Types: Cigarettes     Quit date:      Years since quittin.9    Smokeless tobacco: Never   Vaping Use    Vaping Use: Never used   Substance and Sexual Activity    Alcohol use: Yes     Comment: socially    Drug use: Never    Sexual activity: Not on file   Other Topics Concern    Not on file   Social History Narrative    Not on file     Social Determinants of Health     Financial Resource Strain: Low Risk     Difficulty of Paying Living Expenses: Not hard at all   Food Insecurity: No Food Insecurity    Worried About Running Out of Food in the Last Year: Never true    [address] in the Last Year: Never true   Transportation Needs: No Transportation Needs    Lack of Transportation (Medical): No    Lack of Transportation (Non-Medical): No   Physical Activity: Inactive    Days of Exercise per Week: 0 days    Minutes of Exercise per Session: 0 min   Stress: Not on file   Social Connections: Moderately Isolated    Frequency of Communication with Friends and Family:  Three times a week    Frequency of Social Gatherings with Friends and Family: Once a week    Attends Scientologist Services: Never    Active Member of Clubs or Organizations: No    Attends Club or Organization Meetings: Never    Marital Status:    Intimate Partner Violence: Not on file   Housing Stability: [address] to Pay for Housing in the Last Year: No    Number of [place] in the Last Year: 1    Unstable Housing in the Last Year: No       Vitals:    12/02/22 1305   BP: 134/66   Pulse: 78   Temp: 97.7 °F (36.5 °C)   TempSrc: Temporal   SpO2: 94%   Weight: 173 lb (78.5 kg)   Height: 5' 5\" (1.651 m)       Physical Exam  Vitals and nursing note reviewed. Constitutional:       General: She is not in acute distress. Appearance: She is well-developed. HENT:      Head: Normocephalic and atraumatic. Right Ear: Tympanic membrane, ear canal and external ear normal.      Left Ear: Tympanic membrane, ear canal and external ear normal.      Nose: Nose normal.      Mouth/Throat:      Mouth: Mucous membranes are moist.      Pharynx: Oropharynx is clear. Posterior oropharyngeal erythema present. No oropharyngeal exudate. Comments: Clear mucus draining posterior pharynx  Cardiovascular:      Rate and Rhythm: Normal rate and regular rhythm. Heart sounds: Normal heart sounds. No murmur heard. Pulmonary:      Effort: Pulmonary effort is normal. No respiratory distress. Breath sounds: Wheezing and rhonchi present. No rales. Comments: Bilateral rhonchi and few expiratory wheezes noted in the bases  Musculoskeletal:      Cervical back: Normal range of motion and neck supple. No tenderness. Lymphadenopathy:      Cervical: No cervical adenopathy. Skin:     General: Skin is warm and dry. Neurological:      Mental Status: She is alert and oriented to person, place, and time. Psychiatric:         Mood and Affect: Mood normal.         Behavior: Behavior normal.         Thought Content: Thought content normal.         Judgment: Judgment normal.               Assessment and Plan:  Royce Kenyon was seen today for congestion, fatigue and other. Diagnoses and all orders for this visit:    COVID    Acute cough  -     Cancel: POCT Influenza A/B  -     XR CHEST (2 VW); Future  -     POCT COVID-19, Antigen    SOB (shortness of breath)  -     Cancel: POCT Influenza A/B  -     XR CHEST (2 VW);  Future    Fatigue, unspecified type  -     Cancel: POCT Influenza A/B    Decrease in appetite    Chronic obstructive pulmonary disease, unspecified COPD type ([facility] [address]    Other orders  -     predniSONE (DELTASONE) 10 MG tablet; Take 3 tablets by mouth daily for 2 days, THEN 2 tablets daily for 2 days, THEN 1 tablet daily for 2 days. -     Discontinue: doxycycline hyclate (VIBRA-TABS) 100 MG tablet; Take 1 tablet by mouth 2 times daily for 10 days    Plan: We did test her for COVID antigen and was positive. Not a candidate for Paxlovid with medication interactions. We faxed over form for monoclonal antibody to be done. Quarantine recommendations. Vitamin C vitamin D and zinc recommended. Taper prednisone taper dose for symptomatic relief. Also obtain chest x-ray. Follow-up with PCP. Push fluids. Notify us if not improving. To the emergency room over the weekend if any worsening. Return for fu pcp. Seen By:  Vibah Aguilar MD      *Document was created using voice recognition software. Note was reviewed however may contain grammatical errors.

## 2022-12-13 ENCOUNTER — CARE COORDINATION (OUTPATIENT)
Dept: CARE COORDINATION | Age: 82
End: 2022-12-13

## 2022-12-13 RX ORDER — ASPIRIN 81 MG/1
81 TABLET, CHEWABLE ORAL DAILY
COMMUNITY

## 2022-12-13 RX ORDER — ALPRAZOLAM 0.25 MG/1
0.25 TABLET ORAL 3 TIMES DAILY
COMMUNITY
End: 2022-12-15 | Stop reason: SDUPTHER

## 2022-12-13 NOTE — CARE COORDINATION
Ambulatory Care Coordination Note  12/13/2022    ACC: Ary Marte RN    ACM contacted Stevie Le. Both her and her  were recently diagnosed with Covid-19. She states they are currently asymptomatic. Stevie Le states she is having a \"terrible day\" with regards to feeling anxious. She is scheduled for a follow up with Dr. Silvia Washington on 12/15 to discuss COPD and address medications for anxiety. Stevie Le states she is unable to speak on the phone and Mr. Ricardo Clement will talk. He agrees that Stevie Le has been anxious and at times tearful. He is able to calm her down by diverting her attention to talking about other things like their family or the upcoming holiday. He will accompany Stevie Le to her appointment with PCP. ACM and Mr. Ricardo Clement reviewed Priya's medications. He read each from the bottles. Medication list is updated. There are several medications from prior list that she is not taking. Please see updated list for details. Stevie Le is unable to explain why she is not taking her prescribed medications except that she threw the crestor away because it kept her awake at night. ACM request the [facility] take all the medication bottles in to their upcoming PCP appointment. Stevie Le has been approved for a free six month supply of trelegy through the pharmaceutical company. Mr. Ricardo Clement states Stevie Le is scheduled for PFT studies at [facility] in [facility]. The appointment listed electronically for PFTs is at the [facility] location. Mr. Ricardo Clement is frustrated with the appointment making process and is agreeable to [facility] checking location of PFTs. Plan  Check location of PFTs-PFTs are scheduled at [facility].  Mr. Ricardo Clement updated. Check status of COPD  Review medication list from PCP appointment     Offered patient enrollment in the Remote Patient Monitoring (RPM) program for in-home monitoring:  previously declined .     Lab Results       None            Care Coordination Interventions    Referral from Primary Care Provider: Yes  Suggested Interventions and Community Resources  Medication Assistance Program: Completed (Comment: spiriva and otilio)  Zone Management Tools:  (Comment: COPD Zone Tool)          Goals Addressed                   This Visit's Progress     Conditions and Symptoms   On track     I will schedule office visits, as directed by my provider. I will keep my appointment or reschedule if I have to cancel. I will notify my provider of any barriers to my plan of care. I will follow my Zone Management tool to seek urgent or emergent care. I will notify my provider of any symptoms that indicate a worsening of my condition. Barriers: lack of education  Plan for overcoming my barriers: COPD zone tool and care coordination  Confidence: 8/10  Anticipated Goal Completion Date: 1/15/23                Prior to Admission medications    Medication Sig Start Date End Date Taking?  Authorizing Provider   aspirin 81 MG chewable tablet Take 81 mg by mouth daily   Yes Historical Provider, MD   albuterol sulfate HFA (VENTOLIN HFA) 108 (90 Base) MCG/ACT inhaler Inhale 2 puffs into the lungs 4 times daily as needed for Wheezing 11/22/22  Yes Renetta Manzano,    acetaZOLAMIDE (DIAMOX) 250 MG tablet Take 1 tablet by mouth daily 10/13/22  Yes Xena Johnson MD   TRELEGY ELLIPTA 100-62.5-25 MCG/INH AEPB Inhale 1 puff into the lungs daily 10/13/22  Yes Xena Johnson MD   albuterol sulfate HFA (PROVENTIL HFA) 108 (90 Base) MCG/ACT inhaler Inhale 2 puffs into the lungs every 6 hours as needed for Wheezing or Shortness of Breath 10/13/22  Yes Xena Johnson MD   ipratropium-albuterol (DUONEB) 0.5-2.5 (3) MG/3ML SOLN nebulizer solution Inhale 3 mLs into the lungs 3 times daily 10/10/22  Yes Xena Johnson MD   Omega-3 Fatty Acids (FISH OIL PO) Take by mouth daily   Yes Historical Provider, MD   Cyanocobalamin (VITAMIN B-12 PO) Take by mouth daily   Yes Historical Provider, MD   Cholecalciferol (VITAMIN D) 50 MCG (2000 UT) CAPS capsule Take by mouth daily   Yes Historical Provider, MD   fluticasone (FLONASE) 50 MCG/ACT nasal spray 2 sprays by Each Nostril route daily 9/30/22  Yes Lesvia Gupta MD   azelastine (ASTELIN) 0.1 % nasal spray 1 spray by Nasal route 2 times daily Use in each nostril as directed 9/30/22  Yes Lesvia Gupta MD   amLODIPine (NORVASC) 5 MG tablet 5 mg daily 8/4/22  Yes Historical Provider, MD   losartan (COZAAR) 100 MG tablet Take 100 mg by mouth daily 9/6/22  Yes Historical Provider, MD   OXYGEN Inhale into the lungs 6 LITERS   Yes Historical Provider, MD   ferrous sulfate (IRON 325) 325 (65 Fe) MG tablet Take 325 mg by mouth Twice a Week   Yes Historical Provider, MD   albuterol sulfate HFA (PROVENTIL;VENTOLIN;PROAIR) 108 (90 Base) MCG/ACT inhaler Inhale 2 puffs into the lungs every 6 hours as needed   Yes Historical Provider, MD   rosuvastatin (CRESTOR) 20 MG tablet Take 1 tablet by mouth nightly  Patient not taking: Reported on 12/13/2022 10/13/22   Lesvia Gupta MD   citalopram (CELEXA) 20 MG tablet Take 20 mg by mouth daily  Patient not taking: Reported on 12/13/2022    Historical Provider, MD hunterpirocin [facility]) 2 % ointment  9/12/22   Historical Provider, MD   pantoprazole (PROTONIX) 40 MG tablet Take 40 mg by mouth daily  Patient not taking: Reported on 12/13/2022    Historical Provider, MD   metoprolol tartrate (LOPRESSOR) 25 MG tablet Take 25 mg by mouth daily  Patient not taking: Reported on 12/13/2022    Historical Provider, MD       Future Appointments   Date Time Provider Yandel Huynh   12/15/2022  1:30 PM Lesvia Gupta  [address] [place] [address]   12/19/2022 10:30 AM [facility]ESS [facility] ROOM [facility]   2/22/2023 11:20 AM Renetta Manzano, DO [facility]    and   COPD Assessment    Does the patient understand envrionmental exposure?: Yes  Is the patient able to verbalize Rescue vs. Long Acting medications?: Yes  Does the patient have a nebulizer?: Yes     No patient-reported symptoms         Symptoms:  None: Yes      Symptom course: stable  Increase use of rapid acting/rescue inhaled medications?: No

## 2022-12-15 ENCOUNTER — OFFICE VISIT (OUTPATIENT)
Dept: FAMILY MEDICINE CLINIC | Age: 82
End: 2022-12-15
Payer: MEDICARE

## 2022-12-15 VITALS
OXYGEN SATURATION: 92 % | HEART RATE: 74 BPM | SYSTOLIC BLOOD PRESSURE: 112 MMHG | DIASTOLIC BLOOD PRESSURE: 58 MMHG | TEMPERATURE: 97.3 F

## 2022-12-15 DIAGNOSIS — Z23 NEED FOR PNEUMOCOCCAL VACCINE: ICD-10-CM

## 2022-12-15 DIAGNOSIS — F41.9 ANXIETY: Primary | ICD-10-CM

## 2022-12-15 DIAGNOSIS — I10 PRIMARY HYPERTENSION: ICD-10-CM

## 2022-12-15 PROCEDURE — G0009 ADMIN PNEUMOCOCCAL VACCINE: HCPCS | Performed by: STUDENT IN AN ORGANIZED HEALTH CARE EDUCATION/TRAINING PROGRAM

## 2022-12-15 PROCEDURE — 99214 OFFICE O/P EST MOD 30 MIN: CPT | Performed by: STUDENT IN AN ORGANIZED HEALTH CARE EDUCATION/TRAINING PROGRAM

## 2022-12-15 PROCEDURE — 3078F DIAST BP <80 MM HG: CPT | Performed by: STUDENT IN AN ORGANIZED HEALTH CARE EDUCATION/TRAINING PROGRAM

## 2022-12-15 PROCEDURE — 3074F SYST BP LT 130 MM HG: CPT | Performed by: STUDENT IN AN ORGANIZED HEALTH CARE EDUCATION/TRAINING PROGRAM

## 2022-12-15 PROCEDURE — 1123F ACP DISCUSS/DSCN MKR DOCD: CPT | Performed by: STUDENT IN AN ORGANIZED HEALTH CARE EDUCATION/TRAINING PROGRAM

## 2022-12-15 PROCEDURE — 90677 PCV20 VACCINE IM: CPT | Performed by: STUDENT IN AN ORGANIZED HEALTH CARE EDUCATION/TRAINING PROGRAM

## 2022-12-15 RX ORDER — IBUPROFEN 200 MG
200 TABLET ORAL EVERY 6 HOURS PRN
COMMUNITY

## 2022-12-15 RX ORDER — ALPRAZOLAM 0.25 MG/1
0.25 TABLET ORAL 3 TIMES DAILY PRN
Qty: 60 TABLET | Refills: 0 | Status: SHIPPED | OUTPATIENT
Start: 2022-12-15 | End: 2023-01-14

## 2022-12-15 RX ORDER — AMLODIPINE BESYLATE 5 MG/1
5 TABLET ORAL DAILY
Qty: 90 TABLET | Refills: 1 | Status: SHIPPED | OUTPATIENT
Start: 2022-12-15

## 2022-12-15 RX ORDER — LOSARTAN POTASSIUM 50 MG/1
50 TABLET ORAL DAILY
Qty: 90 TABLET | Refills: 1 | Status: SHIPPED | OUTPATIENT
Start: 2022-12-15

## 2022-12-15 NOTE — Clinical Note
Rozell Gaucher,  Wanted to update you. The current med list in the chart is the accurate one. We are going to use zoloft and xanax for her mood and anxiety right now. No celexa. I also decreased her losartan to 50 mg from 100 mg. She is going to follow up with me again in a month.  Thanks for your help

## 2022-12-15 NOTE — PROGRESS NOTES
[facility]  Office Progress Note  Dr. Arlene Feldman      Patient:  Kirsten Alba 80 y.o. female     Date of Service: 12/15/22      Chief complaint:   Chief Complaint   Patient presents with    COPD    Hypertension         History of Present Illness   The patient is a 80 y.o. female  here to follow up on multiple conditions    End stage COPD, at baseline. On continuous oxygen. On trelegy. Following with pulm. Did test + for COVID last week. Treated with steroids. Recovering well. Due for pneumonia vaccine    Biggest complaint today is mood. It has been hard to determine which medication she has been taking. She was prescribed xanax in the past low dose and stopped it as I requested she wean down. She has done much worse since then. I also prescribed her zoloft, which I don't believe she ever took. Her chart mentions celexa but I'm not sure where that comes from. She did bring her pills with her today-states that she has all of them with her. There is no xanax, zoloft, or celexa. She has fairly severe anxiety, agoraphobia on top of depression. No SI/HI. HTN-BP on the lower side. She occasionally has some light headedness. No chest pain or LE edema. On losartan    Past Medical History:      Diagnosis Date    Anxiety     COPD (chronic obstructive pulmonary disease) ([address]     Hx of blood clots        Review of Systems:   Review of Systems   Constitutional:  Negative for chills and fever. HENT:  Negative for congestion and rhinorrhea. Respiratory:  Positive for shortness of breath. Negative for cough. Cardiovascular:  Negative for chest pain and leg swelling. Gastrointestinal:  Negative for abdominal pain, nausea and vomiting. Genitourinary:  Negative for dysuria and hematuria. Musculoskeletal:  Negative for arthralgias and myalgias. Skin:  Negative for rash and wound. Neurological:  Negative for dizziness and light-headedness.    Psychiatric/Behavioral:  Positive for agitation and understanding, agrees, feels comfortable with and wishes to proceed with above treatment plan. Call or go to ED immediately if symptoms worsen or persist. Advised patient to call with any new medication issues, and, as applicable, read all Rx info from pharmacy to assure aware of all possible risks and side effects of medicationbefore taking. Patient and/or guardian given opportunity to ask questions/raise concerns. The patient verbalized comfort and understanding ofinstructions. Return to Office: Return in about 4 weeks (around 1/12/2023) for Medication Check. Medication List:    Current Outpatient Medications   Medication Sig Dispense Refill    ibuprofen (ADVIL;MOTRIN) 200 MG tablet Take 200 mg by mouth every 6 hours as needed for Pain      amLODIPine (NORVASC) 5 MG tablet Take 1 tablet by mouth daily 90 tablet 1    losartan (COZAAR) 50 MG tablet Take 1 tablet by mouth daily 90 tablet 1    metoprolol tartrate (LOPRESSOR) 25 MG tablet Take 1 tablet by mouth daily 90 tablet 1    ALPRAZolam (XANAX) 0.25 MG tablet Take 1 tablet by mouth 3 times daily as needed for Anxiety for up to 30 days.  60 tablet 0    sertraline (ZOLOFT) 50 MG tablet Take 1 tablet by mouth daily 30 tablet 5    aspirin 81 MG chewable tablet Take 81 mg by mouth daily      albuterol sulfate HFA (VENTOLIN HFA) 108 (90 Base) MCG/ACT inhaler Inhale 2 puffs into the lungs 4 times daily as needed for Wheezing 54 g 1    acetaZOLAMIDE (DIAMOX) 250 MG tablet Take 1 tablet by mouth daily 30 tablet 3    ipratropium-albuterol (DUONEB) 0.5-2.5 (3) MG/3ML SOLN nebulizer solution Inhale 3 mLs into the lungs 3 times daily 360 mL 3    Omega-3 Fatty Acids (FISH OIL PO) Take by mouth daily      Cyanocobalamin (VITAMIN B-12 PO) Take by mouth daily      Cholecalciferol (VITAMIN D) 50 MCG (2000 UT) CAPS capsule Take by mouth daily      pantoprazole (PROTONIX) 40 MG tablet Take 40 mg by mouth daily      OXYGEN Inhale into the lungs 6 LITERS      ferrous sulfate (IRON 325) 325 (65 Fe) MG tablet Take 325 mg by mouth Twice a Week       No current facility-administered medications for this visit. Iliana Brown MD     This document may have been prepared at least partially through the use of voice recognition software. Although effort is taken to assure the accuracy ofthis document, it is possible that grammatical, syntax, or spelling errors may occur.

## 2022-12-16 ASSESSMENT — ENCOUNTER SYMPTOMS
RHINORRHEA: 0
SHORTNESS OF BREATH: 1
ABDOMINAL PAIN: 0
VOMITING: 0
COUGH: 0
NAUSEA: 0

## 2022-12-19 ENCOUNTER — HOSPITAL ENCOUNTER (OUTPATIENT)
Dept: PULMONOLOGY | Age: 82
Discharge: HOME | End: 2022-12-19
Payer: MEDICARE

## 2022-12-19 DIAGNOSIS — J42 CHRONIC BRONCHITIS, UNSPECIFIED CHRONIC BRONCHITIS TYPE (HCC): ICD-10-CM

## 2022-12-19 PROCEDURE — 94060 EVALUATION OF WHEEZING: CPT

## 2022-12-19 PROCEDURE — 94726 PLETHYSMOGRAPHY LUNG VOLUMES: CPT

## 2022-12-19 PROCEDURE — 94729 DIFFUSING CAPACITY: CPT

## 2022-12-20 NOTE — PROCEDURES
[address] [address]                               PULMONARY FUNCTION    PATIENT NAME: Franco Officer                     :        1940  MED REC NO:   [id number]                            ROOM:  ACCOUNT NO:   [de-identified]                           ADMIT DATE: 2022  PROVIDER:     Oumou Chauhan DO    DATE OF PROCEDURE:  2022    REFERRING PROVIDER:  Oumou Chauhan DO    HEIGHT:  65 inches. WEIGHT:  173 pounds. The patient reports never smoking. SPIROMETRY:  FVC is 1.8 liters which is 68% of predicted with a Z-score  of -1.75.  FEV1 is 0.65 liters which is 32% of predicted with a Z-score  of -3.54.  FEV1 to FVC ratio is 0.36 with Z-score of -3.87. There is no  significant bronchodilator response. MVV is 20.65 which is 25% of predicted. LUNG VOLUMES:  SVC is 1.48 liters which is 66% of predicted with a  Z-score of -2.46. ERV is 0.50 liters which is 56% of predicted. RV is  3.28 liters which is 134% of predicted with a Z-score of 2.20. Total  lung capacity is 4.75 liters which is 91% of predicted with a Z-score of  -0. 84. RV TLC ratio is 69. DIFFUSION:  Diffusion is 5.70 mL/min of mercury which is 30% of  predicted with a Z-score of -5.85 corrected for alveolar ventilation is  43% of predicted. FLOW VOLUME LOOP:  Flow volume loop is consistent with severe  obstruction. OVERALL INTERPRETATION:  The patient's pulmonary function testing is  consistent with a severe obstruction along with air trapping. The  diffusion is severely reduced and only partially corrects for alveolar  ventilation.         Miracle Gilmore DO    D: 2022 10:53:18       T: 2022 10:55:51     [email or web address]  Job#: [id number]     Doc#: [id number]    CC:

## 2022-12-21 ENCOUNTER — CARE COORDINATION (OUTPATIENT)
Dept: CARE COORDINATION | Age: 82
End: 2022-12-21

## 2022-12-21 ASSESSMENT — ENCOUNTER SYMPTOMS: DYSPNEA ASSOCIATED WITH: MINIMAL EXERTION

## 2022-12-21 NOTE — CARE COORDINATION
Ambulatory Care Coordination Note  12/21/2022    ACC: Lydia Goode, RUFINO    ACM contacted Sherri Garg. She states she had an appointment with Dr. Deng Saha and reviewed her medications. ACM reviewed medication list with Sherri Garg. She states she has filled the new medication (zoloft) and has begun taking it. She reports being less anxious and has resumed her xanax as prescribed. Sherri Garg states she uses a weekly pill box that she fills herself. ACM educated patient on the benefits of prepackaged medications. She declines to enroll in pre packaged prescriptions. Sherri Garg states her COPD is at baseline. She is in the green zone on the zone management tool. She is awaiting results of a pulmonary function test.  ACM reviewed follow up appointment (PCP) date and time with Sherri Garg. Offered patient enrollment in the Remote Patient Monitoring (RPM) program for in-home monitoring: Patient declined    Plan  Check status of COPD  Review medications/check compliance  Continue to follow for care coordination . Lab Results       None            Care Coordination Interventions    Referral from Primary Care Provider: Yes  Suggested Interventions and Community Resources  Medication Assistance Program: Completed (Comment: spiriva and trelogy)  Medi Set or Pill Pack: Declined  Zone Management Tools:  (Comment: COPD Zone Tool)          Goals Addressed                   This Visit's Progress     Conditions and Symptoms   On track     I will schedule office visits, as directed by my provider. I will keep my appointment or reschedule if I have to cancel. I will notify my provider of any barriers to my plan of care. I will follow my Zone Management tool to seek urgent or emergent care. I will notify my provider of any symptoms that indicate a worsening of my condition.     Barriers: lack of education  Plan for overcoming my barriers: COPD zone tool and care coordination  Confidence: 8/10  Anticipated Goal Completion Date: 1/15/23 Prior to Admission medications    Medication Sig Start Date End Date Taking? Authorizing Provider   ibuprofen (ADVIL;MOTRIN) 200 MG tablet Take 200 mg by mouth every 6 hours as needed for Pain    Historical Provider, MD   amLODIPine (NORVASC) 5 MG tablet Take 1 tablet by mouth daily 12/15/22   Nora Gilford, MD   losartan (COZAAR) 50 MG tablet Take 1 tablet by mouth daily 12/15/22   Nora Gilford, MD   metoprolol tartrate (LOPRESSOR) 25 MG tablet Take 1 tablet by mouth daily 12/15/22   Nora Gilford, MD   ALPRAZolam Aleisha Ena) 0.25 MG tablet Take 1 tablet by mouth 3 times daily as needed for Anxiety for up to 30 days.  12/15/22 1/14/23  Nora Gilford, MD   sertraline (ZOLOFT) 50 MG tablet Take 1 tablet by mouth daily 12/15/22   Nora Gilford, MD   aspirin 81 MG chewable tablet Take 81 mg by mouth daily    Historical Provider, MD   albuterol sulfate HFA (VENTOLIN HFA) 108 (90 Base) MCG/ACT inhaler Inhale 2 puffs into the lungs 4 times daily as needed for Wheezing 11/22/22   Gregoria Manzano DO   acetaZOLAMIDE (DIAMOX) 250 MG tablet Take 1 tablet by mouth daily 10/13/22   Nora Gilford, MD   ipratropium-albuterol (DUONEB) 0.5-2.5 (3) MG/3ML SOLN nebulizer solution Inhale 3 mLs into the lungs 3 times daily 10/10/22   Nora Gilford, MD   Omega-3 Fatty Acids (FISH OIL PO) Take by mouth daily    Historical Provider, MD   Cyanocobalamin (VITAMIN B-12 PO) Take by mouth daily    Historical Provider, MD   Cholecalciferol (VITAMIN D) 50 MCG (2000 UT) CAPS capsule Take by mouth daily    Historical Provider, MD   pantoprazole (PROTONIX) 40 MG tablet Take 40 mg by mouth daily    Historical Provider, MD   OXYGEN Inhale into the lungs 6 LITERS    Historical Provider, MD   ferrous sulfate (IRON 325) 325 (65 Fe) MG tablet Take 325 mg by mouth Twice a Week    Historical Provider, MD       Future Appointments   Date Time Provider Yandel Huynh   1/18/2023  1:45 PM Nora Gilford,  [address] [place] [address] 2/22/2023 11:20 AM Renetta Edwards, DO [facility]    and   COPD Assessment    Does the patient understand envrionmental exposure?: Yes  Is the patient able to verbalize Rescue vs. Long Acting medications?: Yes  Does the patient have a nebulizer?: Yes  Does the patient use a space with inhaled medications?: No     No patient-reported symptoms         Symptoms:  None: Yes      Symptom course: stable  Breathlessness: minimal exertion  Increase use of rapid acting/rescue inhaled medications?: No  Change in chronic cough?: No/At Baseline

## 2023-01-17 NOTE — PROGRESS NOTES
[facility]     HISTORY OF PRESENT ILLNESS:    Tabitha Nicolas is a [de-identified]y.o. year old female here for evaluation of COPD, pulmonary lung nodule. The patient has an extensive pulmonary history. She was seen initially by Dr. Ronaldo Varela and then Dr. Michelle nava in the [facility] area. She states that she has been oxygen for the last 12 years. She did go to pulmonary rehab in the past.  She also states that she was evaluated for transplant in [place] but was denied due to age. She has had multiple hospitalizations for COPD exacerbation. Last hospitalization was 2 months ago. She reports that she does try to stay as active as possible. She does do the dishes at home. She denies symptoms of shortness of breath with showering or shortness of breath with tying of shoes. She does have symptoms of dry mouth, coughing, wheezing. She reports that she is changing pulmonologist because they are now living in [facility]. Home medications include trilogy rescue inhaler however she does not currently have 1 and DuoNebs which she uses twice a day. Her home medical company was [employer].       ALLERGIES:  No Known Allergies    PAST MEDICAL HISTORY:       Diagnosis Date    Anxiety     COPD (chronic obstructive pulmonary disease) (HCC)     Hx of blood clots        MEDICATIONS:   Current Outpatient Medications   Medication Sig Dispense Refill    albuterol sulfate HFA (VENTOLIN HFA) 108 (90 Base) MCG/ACT inhaler Inhale 2 puffs into the lungs 4 times daily as needed for Wheezing 54 g 1    ibuprofen (ADVIL;MOTRIN) 200 MG tablet Take 200 mg by mouth every 6 hours as needed for Pain      amLODIPine (NORVASC) 5 MG tablet Take 1 tablet by mouth daily 90 tablet 1    losartan (COZAAR) 50 MG tablet Take 1 tablet by mouth daily 90 tablet 1    metoprolol tartrate (LOPRESSOR) 25 MG tablet Take 1 tablet by mouth daily 90 tablet 1    sertraline (ZOLOFT) 50 MG tablet Take 1 tablet by mouth daily 30 tablet 5    aspirin 81 MG chewable tablet Take 81 mg by mouth daily      acetaZOLAMIDE (DIAMOX) 250 MG tablet Take 1 tablet by mouth daily 30 tablet 3    ipratropium-albuterol (DUONEB) 0.5-2.5 (3) MG/3ML SOLN nebulizer solution Inhale 3 mLs into the lungs 3 times daily 360 mL 3    Omega-3 Fatty Acids (FISH OIL PO) Take by mouth daily      Cyanocobalamin (VITAMIN B-12 PO) Take by mouth daily      Cholecalciferol (VITAMIN D) 50 MCG (2000 UT) CAPS capsule Take by mouth daily      pantoprazole (PROTONIX) 40 MG tablet Take 40 mg by mouth daily      OXYGEN Inhale into the lungs 6 LITERS      ferrous sulfate (IRON 325) 325 (65 Fe) MG tablet Take 325 mg by mouth Twice a Week       No current facility-administered medications for this visit. SOCIAL AND OCCUPATIONAL HEALTH: The patient is a former smoker. Denies any occupational exposures. SURGICAL HISTORY:   Past Surgical History:   Procedure Laterality Date    JOINT REPLACEMENT Right 12/30/2019    RIGHT HIP    LEG AMPUTATION BELOW KNEE Left 01/02/2017       FAMILY HISTORY: No family history of cancer, blood clots     REVIEW OF SYSTEMS:  Constitutional: No fevers, chills, unintentional weight loss  Skin: No rashes or lesions  EENT: No change in vision, change in hearing, change in taste, change in smell  Cardiovascular: Denies chest pain, chest pressure, palpitations  Respiration: Positive for wheezing, coughing, shortness of breath with exertion. Gastrointestinal: Denies nausea, vomiting, diarrhea  Musculoskeletal: Denies joint or muscle pain  Neurological: Denies syncope, headache, seizures  Psychological: Denies anxiety or depression  Endocrine: Denies polyuria polydipsia  Hematopoietic/lymphatic: Denies easy bruising        PHYSICAL EXAMINATION:  Constitutional: Well-nourished, well-developed. No acute distress  EENT: PERRL, EOMI, no oropharyngeal erythema.   No palpable adenopathy  Neck: Trachea and thyroid midline  Respiratory: Overall diminished bilaterally  Cardiovascular: Regular rate and rhythm, no murmurs rubs or gallops  Pulses: Equal bilaterally  Abdomen: Soft nontender bowel sounds present  Lymphatic: No palpable adenopathy  Musculoskeletal: Gait steady  Extremities: No clubbing, cyanosis, edema. Left BKA  Skin: No rashes or lesions  Neurological/Psychiatric: Neurologically intact, no focal deficits. Affect appropriate    DATA:   No PFTs available at this time. CT of the chest in August 2022 reviewed. This showed mild atelectasis and scarring in both lungs. No adenopathy. IMPRESSION:       1. Severe COPD  2 chronic hypoxemic respiratory failure  3. History of tobacco use    PLAN:      1. Continue Trelegy, DuoNebs as needed. Rescue inhaler ordered. 2.  Continue oxygen at 3 L nasal cannula continuously  3. Full PFTs ordered  4. Up-to-date vaccinations recommended  5. We will obtain records from patient's prior pulmonary offices. 6.  Patient to follow-up in 3 months      I hope this updates you on my evaluation and clinical thinking. Thank you for allowing me to participate in his care.      Sincerely,        Noe Allred.  Office: [phone]  Fax: [phone]

## 2023-01-18 ENCOUNTER — OFFICE VISIT (OUTPATIENT)
Dept: FAMILY MEDICINE CLINIC | Age: 83
End: 2023-01-18
Payer: MEDICARE

## 2023-01-18 VITALS
SYSTOLIC BLOOD PRESSURE: 138 MMHG | WEIGHT: 166 LBS | DIASTOLIC BLOOD PRESSURE: 84 MMHG | HEART RATE: 76 BPM | TEMPERATURE: 97 F | OXYGEN SATURATION: 95 % | BODY MASS INDEX: 27.62 KG/M2

## 2023-01-18 DIAGNOSIS — F41.9 ANXIETY: ICD-10-CM

## 2023-01-18 DIAGNOSIS — F41.9 ANXIETY: Primary | ICD-10-CM

## 2023-01-18 DIAGNOSIS — L60.8 TOENAIL DEFORMITY: Primary | ICD-10-CM

## 2023-01-18 DIAGNOSIS — H93.8X3 EAR PRESSURE, BILATERAL: ICD-10-CM

## 2023-01-18 PROCEDURE — 99214 OFFICE O/P EST MOD 30 MIN: CPT | Performed by: STUDENT IN AN ORGANIZED HEALTH CARE EDUCATION/TRAINING PROGRAM

## 2023-01-18 PROCEDURE — 3075F SYST BP GE 130 - 139MM HG: CPT | Performed by: STUDENT IN AN ORGANIZED HEALTH CARE EDUCATION/TRAINING PROGRAM

## 2023-01-18 PROCEDURE — 1123F ACP DISCUSS/DSCN MKR DOCD: CPT | Performed by: STUDENT IN AN ORGANIZED HEALTH CARE EDUCATION/TRAINING PROGRAM

## 2023-01-18 PROCEDURE — 3079F DIAST BP 80-89 MM HG: CPT | Performed by: STUDENT IN AN ORGANIZED HEALTH CARE EDUCATION/TRAINING PROGRAM

## 2023-01-18 RX ORDER — ALPRAZOLAM 0.25 MG/1
0.25 TABLET ORAL 2 TIMES DAILY PRN
Qty: 60 TABLET | Refills: 0 | Status: SHIPPED | OUTPATIENT
Start: 2023-01-18 | End: 2023-02-17

## 2023-01-18 RX ORDER — CETIRIZINE HYDROCHLORIDE 10 MG/1
10 TABLET ORAL DAILY
COMMUNITY

## 2023-01-18 RX ORDER — SERTRALINE HYDROCHLORIDE 100 MG/1
100 TABLET, FILM COATED ORAL DAILY
Qty: 30 TABLET | Refills: 1 | Status: SHIPPED | OUTPATIENT
Start: 2023-01-30

## 2023-01-18 RX ORDER — ALPRAZOLAM 0.25 MG/1
0.25 TABLET ORAL 3 TIMES DAILY PRN
Qty: 60 TABLET | Refills: 0 | OUTPATIENT
Start: 2023-01-18 | End: 2023-02-17

## 2023-01-18 ASSESSMENT — ENCOUNTER SYMPTOMS
VOMITING: 0
RHINORRHEA: 0
COUGH: 0
SHORTNESS OF BREATH: 1
ABDOMINAL PAIN: 0
NAUSEA: 0

## 2023-01-18 ASSESSMENT — PATIENT HEALTH QUESTIONNAIRE - PHQ9
2. FEELING DOWN, DEPRESSED OR HOPELESS: 0
SUM OF ALL RESPONSES TO PHQ QUESTIONS 1-9: 0
SUM OF ALL RESPONSES TO PHQ QUESTIONS 1-9: 0
SUM OF ALL RESPONSES TO PHQ9 QUESTIONS 1 & 2: 0
1. LITTLE INTEREST OR PLEASURE IN DOING THINGS: 0
SUM OF ALL RESPONSES TO PHQ QUESTIONS 1-9: 0
SUM OF ALL RESPONSES TO PHQ QUESTIONS 1-9: 0

## 2023-01-18 NOTE — TELEPHONE ENCOUNTER
Medication refilled.  Please let her know I would like her to try and make these last for longer than a month

## 2023-01-18 NOTE — PROGRESS NOTES
[facility]  Office Progress Note  Dr. Kirk Zepeda      Patient:  Betsey Garcia [de-identified] y.o. female     Date of Service: 1/18/23      Chief complaint:   Chief Complaint   Patient presents with    Anxiety     4 week follow up    Otalgia     Ears cracking          History of Present Illness   The patient is a [de-identified] y.o. female  here to follow up of their anxiety and some ear issues, popping/clicking    Ears-chronic issue. She used to take zyrtec but stopped. No fever. Does have some chronic sinus issues that change with the season. No cough. Chronic SOB due to COPD that is currently at baseline. Anxiety is slightly better on zoloft. She is still using the xanax BID, previously she had been using TID. We discussed that the plan is to wean off of this medication as the Zoloft is increased. She is extremely anxious but  and wife are in agreement with the plan. She has difficulty cutting her toenails and is requesting to follow with podiatry    Past Medical History:      Diagnosis Date    Anxiety     COPD (chronic obstructive pulmonary disease) ([facility] [address]     Hx of blood clots        Review of Systems:   Review of Systems   Constitutional:  Negative for chills and fever. HENT:  Positive for ear pain. Negative for congestion and rhinorrhea. Respiratory:  Positive for shortness of breath. Negative for cough. Cardiovascular:  Negative for chest pain and leg swelling. Gastrointestinal:  Negative for abdominal pain, nausea and vomiting. Genitourinary:  Negative for dysuria and hematuria. Musculoskeletal:  Negative for arthralgias and myalgias. Skin:  Negative for rash and wound. Neurological:  Negative for dizziness and light-headedness. Psychiatric/Behavioral:  The patient is nervous/anxious.       Physical Exam   Vitals: /84   Pulse 76   Temp 97 °F (36.1 °C)   Wt 166 lb (75.3 kg)   SpO2 95% Comment: 5 liters  BMI 27.62 kg/m²   Physical Exam    General:  Well developed, well nourished, well groomed. No apparent distress. HEENT:  Normocephalic, atraumatic. No scleral icterus. No conjunctival injection. No nasal discharge. Heart:  RRR, no murmurs, gallops, or rubs  Lungs:  CTA bilaterally, bilat symmetrical expansion, no wheeze, rales, or rhonchi  Abdomen: Bowel sounds present, soft, nontender, no masses, no organomegaly, no peritoneal signs  Extremities:  No clubbing, cyanosis, or edema  Neuro:  Alert and oriented x3, no focal deficits      Assessment and Plan     Continue to increase zoloft. Will refill low dose xanax for now and explained I want her to try and make this last longer than a month. PDMP reviewed. No abuse or diversion identified. She has been on this medication long term and has already decreased her dosage somewhat. Restart at home zyrtec for likely sinus issues/Eustachian tube dysfunction, let me know if not improving  Referral placed to podiatry      1. Anxiety    - sertraline (ZOLOFT) 100 MG tablet; Take 1 tablet by mouth daily  Dispense: 30 tablet; Refill: 1  - ALPRAZolam (XANAX) 0.25 MG tablet; Take 1 tablet by mouth 2 times daily as needed for Anxiety for up to 30 days. Max Daily Amount: 0.5 mg  Dispense: 60 tablet; Refill: 0    2. Toenail deformity    - Emma - Archana Briscoe DPM, Podiatry, [place]    3. Ear pressure, bilateral      Counseled regarding above diagnosis, including possible risks and complications,  especially if left uncontrolled. Counseled regarding the possible side effects, risks, benefits and alternatives to treatment;patient and/or guardian verbalizes understanding, agrees, feels comfortable with and wishes to proceed with above treatment plan. Call or go to ED immediately if symptoms worsen or persist. Advised patient to call with any new medication issues, and, as applicable, read all Rx info from pharmacy to assure aware of all possible risks and side effects of medicationbefore taking.      Patient and/or guardian given opportunity to ask questions/raise concerns. The patient verbalized comfort and understanding ofinstructions. Return to Office: Return in about 3 months (around 4/18/2023) for Medication Check. Medication List:    Current Outpatient Medications   Medication Sig Dispense Refill    [START ON 1/30/2023] sertraline (ZOLOFT) 100 MG tablet Take 1 tablet by mouth daily 30 tablet 1    ALPRAZolam (XANAX) 0.25 MG tablet Take 1 tablet by mouth 2 times daily as needed for Anxiety for up to 30 days. Max Daily Amount: 0.5 mg 60 tablet 0    cetirizine (ZYRTEC) 10 MG tablet Take 10 mg by mouth daily      ibuprofen (ADVIL;MOTRIN) 200 MG tablet Take 200 mg by mouth every 6 hours as needed for Pain      amLODIPine (NORVASC) 5 MG tablet Take 1 tablet by mouth daily 90 tablet 1    losartan (COZAAR) 50 MG tablet Take 1 tablet by mouth daily 90 tablet 1    metoprolol tartrate (LOPRESSOR) 25 MG tablet Take 1 tablet by mouth daily 90 tablet 1    aspirin 81 MG chewable tablet Take 81 mg by mouth daily      albuterol sulfate HFA (VENTOLIN HFA) 108 (90 Base) MCG/ACT inhaler Inhale 2 puffs into the lungs 4 times daily as needed for Wheezing 54 g 1    acetaZOLAMIDE (DIAMOX) 250 MG tablet Take 1 tablet by mouth daily 30 tablet 3    ipratropium-albuterol (DUONEB) 0.5-2.5 (3) MG/3ML SOLN nebulizer solution Inhale 3 mLs into the lungs 3 times daily 360 mL 3    Omega-3 Fatty Acids (FISH OIL PO) Take by mouth daily      Cyanocobalamin (VITAMIN B-12 PO) Take by mouth daily      Cholecalciferol (VITAMIN D) 50 MCG (2000 UT) CAPS capsule Take by mouth daily      pantoprazole (PROTONIX) 40 MG tablet Take 40 mg by mouth daily      OXYGEN Inhale into the lungs 6 LITERS      ferrous sulfate (IRON 325) 325 (65 Fe) MG tablet Take 325 mg by mouth Twice a Week       No current facility-administered medications for this visit.         Dhruv Escamilla MD     This document may have been prepared at least partially through the use of voice recognition software. Although effort is taken to assure the accuracy ofthis document, it is possible that grammatical, syntax, or spelling errors may occur.

## 2023-01-18 NOTE — TELEPHONE ENCOUNTER
Last Appointment:  1/18/2023  Future Appointments   Date Time Provider Yandel Amina   2/7/2023  2:15 PM Maurice Pérez DPM [facility] Podiat[facility]   2/22/2023 11:20 AM Kevin Cannon DO [facility] Pul[facility]   4/18/2023  1:30 PM Florina Silva  W [facility]

## 2023-01-18 NOTE — TELEPHONE ENCOUNTER
----- Message from Adrianswetha Ruthann sent at 1/18/2023  3:09 PM EST -----  Subject: Refill Request    QUESTIONS  Name of Medication? ALPRAZolam (XANAX) 0.25 MG tablet  Patient-reported dosage and instructions? as needed  How many days do you have left? 0  Preferred Pharmacy? [facility] phone number (if available)? [phone]  ---------------------------------------------------------------------------  --------------  CALL BACK INFO  What is the best way for the office to contact you? OK to leave message on   voicemail  Preferred Call Back Phone Number? [phone]  ---------------------------------------------------------------------------  --------------  SCRIPT ANSWERS  Relationship to Patient? Other  Representative Name? spouse  Is the Representative on the appropriate HIPAA document in Epic?  Yes

## 2023-01-23 ENCOUNTER — CARE COORDINATION (OUTPATIENT)
Dept: CARE COORDINATION | Age: 83
End: 2023-01-23

## 2023-02-02 ENCOUNTER — CARE COORDINATION (OUTPATIENT)
Dept: CARE COORDINATION | Age: 83
End: 2023-02-02

## 2023-02-07 ENCOUNTER — TELEPHONE (OUTPATIENT)
Dept: FAMILY MEDICINE CLINIC | Age: 83
End: 2023-02-07

## 2023-02-07 ENCOUNTER — OFFICE VISIT (OUTPATIENT)
Dept: PODIATRY | Age: 83
End: 2023-02-07
Payer: MEDICARE

## 2023-02-07 VITALS — HEIGHT: 65 IN | WEIGHT: 166 LBS | BODY MASS INDEX: 27.66 KG/M2

## 2023-02-07 DIAGNOSIS — Z79.01 ENCOUNTER FOR CURRENT LONG-TERM USE OF ANTICOAGULANTS: Primary | ICD-10-CM

## 2023-02-07 DIAGNOSIS — L85.3 XEROSIS CUTIS: ICD-10-CM

## 2023-02-07 DIAGNOSIS — G60.8 HEREDITARY SENSORY NEUROPATHY: ICD-10-CM

## 2023-02-07 DIAGNOSIS — Z89.512 HISTORY OF BELOW-KNEE AMPUTATION OF LEFT LOWER EXTREMITY (HCC): ICD-10-CM

## 2023-02-07 DIAGNOSIS — B35.1 TINEA UNGUIUM: ICD-10-CM

## 2023-02-07 DIAGNOSIS — I73.9 PERIPHERAL ARTERIAL DISEASE (HCC): ICD-10-CM

## 2023-02-07 PROCEDURE — 99203 OFFICE O/P NEW LOW 30 MIN: CPT | Performed by: PODIATRIST

## 2023-02-07 PROCEDURE — 1123F ACP DISCUSS/DSCN MKR DOCD: CPT | Performed by: PODIATRIST

## 2023-02-07 PROCEDURE — 11720 DEBRIDE NAIL 1-5: CPT | Performed by: PODIATRIST

## 2023-02-07 RX ORDER — AMMONIUM LACTATE 12 G/100G
LOTION TOPICAL
Qty: 400 G | Refills: 2 | Status: SHIPPED | OUTPATIENT
Start: 2023-02-07

## 2023-02-07 NOTE — PROGRESS NOTES
Barb Topete is here today as a new a  new patient for nail care. her PCP is Davi Pineda MD last OV was 2023. Previous BKA 5 years ago from a circulation issues. 23  Barb Topete : 1940 Sex: female  Age: [de-identified] y.o. Patient was referred by Davi Pineda MD    CC:   Painful elongated toenails 1-5 right   History left lower leg amputation    HPI  This pleasant 81-year-old female patient referred me today foot ankle exam.  History lower extremity limb loss left lower leg from [facility] approximately 5 years ago. States she did develop gangrene on the left foot and did require amputation. States she has been following with the [facility] vascular surgery team and states the last time she saw them they were pleased with her progression of her right foot and she had no issues with circulation on the right side. Denies calf pain. Tolerating prosthetic left lower leg well. Does have difficulty cutting her toenails on the right foot. Here today for painful elongated toenails 1-5 right  No additional pedal complaints at this time. ROS:  Const: Denies constitutional symptoms  Musculo: Denies symptoms other than stated above  Skin: Denies symptoms other than stated above      Current Outpatient Medications:     ammonium lactate (LAC-HYDRIN) 12 % lotion, Apply topically twice daily, Disp: 400 g, Rfl: 2    sertraline (ZOLOFT) 100 MG tablet, Take 1 tablet by mouth daily, Disp: 30 tablet, Rfl: 1    ALPRAZolam (XANAX) 0.25 MG tablet, Take 1 tablet by mouth 2 times daily as needed for Anxiety for up to 30 days.  Max Daily Amount: 0.5 mg, Disp: 60 tablet, Rfl: 0    cetirizine (ZYRTEC) 10 MG tablet, Take 10 mg by mouth daily, Disp: , Rfl:     ibuprofen (ADVIL;MOTRIN) 200 MG tablet, Take 200 mg by mouth every 6 hours as needed for Pain, Disp: , Rfl:     amLODIPine (NORVASC) 5 MG tablet, Take 1 tablet by mouth daily, Disp: 90 tablet, Rfl: 1    losartan (COZAAR) 50 MG tablet, Take 1 tablet by mouth daily, Disp: 90 tablet, Rfl: 1    metoprolol tartrate (LOPRESSOR) 25 MG tablet, Take 1 tablet by mouth daily, Disp: 90 tablet, Rfl: 1    aspirin 81 MG chewable tablet, Take 81 mg by mouth daily, Disp: , Rfl:     albuterol sulfate HFA (VENTOLIN HFA) 108 (90 Base) MCG/ACT inhaler, Inhale 2 puffs into the lungs 4 times daily as needed for Wheezing, Disp: 54 g, Rfl: 1    acetaZOLAMIDE (DIAMOX) 250 MG tablet, Take 1 tablet by mouth daily, Disp: 30 tablet, Rfl: 3    ipratropium-albuterol (DUONEB) 0.5-2.5 (3) MG/3ML SOLN nebulizer solution, Inhale 3 mLs into the lungs 3 times daily, Disp: 360 mL, Rfl: 3    Omega-3 Fatty Acids (FISH OIL PO), Take by mouth daily, Disp: , Rfl:     Cyanocobalamin (VITAMIN B-12 PO), Take by mouth daily, Disp: , Rfl:     Cholecalciferol (VITAMIN D) 50 MCG (2000 UT) CAPS capsule, Take by mouth daily, Disp: , Rfl:     pantoprazole (PROTONIX) 40 MG tablet, Take 40 mg by mouth daily, Disp: , Rfl:     OXYGEN, Inhale into the lungs 6 LITERS, Disp: , Rfl:     ferrous sulfate (IRON 325) 325 (65 Fe) MG tablet, Take 325 mg by mouth Twice a Week, Disp: , Rfl:   No Known Allergies    Past Medical History:   Diagnosis Date    Anxiety     COPD (chronic obstructive pulmonary disease) ([facility])     Hx of blood clots      There were no vitals filed for this visit. Work History/Social History: Foot and ankle history:     Focused Lower Extremity Physical Exam:    Neurovascular examination:    Dorsalis Pedis palpable right. Posterior tibialis palpable right. Capillary Refill Time:  Immediate return  Hair growth:  Symmetrical Skin:  Not atrophic  Edema: Mild edema dorsal right foot. Neurologic:  Light touch diminished right. Musculoskeletal/ Orthopedic examination:    Status post below-knee amputation left lower leg. Prosthetic in place. 5 out of 5 muscle strength dorsiflexion, plantarflexion, inversion eversion right. Negative Homans right. No pain right foot or ankle.     Dermatology examination: Toenails 1 through 5 right thickened, elongated, dystrophic, mycotic with subungual debris. Status post below-knee amputation left lower leg. No open wounds bilateral lower legs. Dry skin plantar right heel. No erythema. Assessment and Plan:  Vic Petersen was seen today for callouses and nail problem. Diagnoses and all orders for this visit:    Encounter for current long-term use of anticoagulants    History of below-knee amputation of left lower extremity (HCC)    Tinea unguium    Hereditary sensory neuropathy    Xerosis cutis    Other orders  -     ammonium lactate (LAC-HYDRIN) 12 % lotion; Apply topically twice daily      New referral today. History of peripheral arterial disease. History lower extremity amputation left lower leg at [facility] approximately 5 years ago. She does have palpable pedal pulses on the right lower leg today. I did stress importance of continued follow-up with vascular surgery going forward. Any new skin lesions or abrasions I be happy to see her sooner. Formal debridement toenails 1 through 5 right with manual debridement for thickness and overall length. Ammonium lactate 12% twice daily right foot. Follow-up in office 2 months. Importance of avoiding barefoot. Importance of daily foot and ankle exam.      Return in about 2 months (around 4/7/2023). Seen By:  Panchito Pa DPM      Document was created using voice recognition software. Note was reviewed however may contain grammatical errors.

## 2023-02-08 NOTE — TELEPHONE ENCOUNTER
I left a detailed message on her voicemail to call [facility] and ask about the new script that was sent in December, giving her another 6 months of medication.

## 2023-02-08 NOTE — TELEPHONE ENCOUNTER
Please confirm this request. Looks like I refilled 90 days with a refill of her amlodipine on December 15

## 2023-02-09 RX ORDER — ACETAZOLAMIDE 250 MG/1
250 TABLET ORAL DAILY
Qty: 30 TABLET | Refills: 2 | Status: SHIPPED | OUTPATIENT
Start: 2023-02-09

## 2023-02-09 NOTE — TELEPHONE ENCOUNTER
----- Message from Rachel Edmond sent at 2/9/2023 12:50 PM EST -----  Subject: Refill Request    QUESTIONS  Name of Medication? acetaZOLAMIDE (DIAMOX) 250 MG tablet  Patient-reported dosage and instructions? 250 Mg 1 tab po qd  How many days do you have left? 0  Preferred Pharmacy? [facility] phone number (if available)? [phone]  ---------------------------------------------------------------------------  --------------  CALL BACK INFO  What is the best way for the office to contact you? OK to leave message on   voicemail  Preferred Call Back Phone Number? [phone]  ---------------------------------------------------------------------------  --------------  SCRIPT ANSWERS  Relationship to Patient? Other  Representative Name? Aleishagloria Gross  Is the [place] [employer] on the appropriate HIPAA document in Epic?  Yes

## 2023-02-09 NOTE — TELEPHONE ENCOUNTER
Last Appointment:  1/18/2023  Future Appointments   Date Time Provider Yandel Leonardoi   2/22/2023 11:20 AM Malvin Cheadle, DO [facility] Pul[facility]   4/11/2023  2:00 PM Margarita Lacey DPM Col Podiatry [facility]   4/18/2023  1:30 PM Chay Nguyen  W [facility] [address]

## 2023-02-13 ENCOUNTER — CARE COORDINATION (OUTPATIENT)
Dept: CARE COORDINATION | Age: 83
End: 2023-02-13

## 2023-02-22 DIAGNOSIS — F41.9 ANXIETY: Primary | ICD-10-CM

## 2023-02-22 RX ORDER — ALPRAZOLAM 0.25 MG/1
0.25 TABLET ORAL 2 TIMES DAILY
Qty: 60 TABLET | Refills: 0 | Status: SHIPPED | OUTPATIENT
Start: 2023-02-22 | End: 2023-03-24

## 2023-02-22 RX ORDER — ALPRAZOLAM 0.25 MG/1
0.25 TABLET ORAL 2 TIMES DAILY
COMMUNITY
End: 2023-02-22 | Stop reason: SDUPTHER

## 2023-02-22 NOTE — TELEPHONE ENCOUNTER
Med refilled. Please ask her to try and make this last until the beginning of April. I would still like her to take the least amount possible.     PDMP reviewed and consistentr

## 2023-02-22 NOTE — TELEPHONE ENCOUNTER
----- Message from August Carls sent at 2/22/2023 10:49 AM EST -----  Subject: Refill Request    QUESTIONS  Name of Medication? ALPRAZolam (XANAX) 0.25 MG tablet  Patient-reported dosage and instructions? ALPRAZolam (XANAX) 0.25 MG   tablet  PRN  How many days do you have left? 1  Preferred Pharmacy? [facility] phone number (if available)? [phone]  ---------------------------------------------------------------------------  --------------  CALL BACK INFO  What is the best way for the office to contact you? OK to leave message on   voicemail  Preferred Call Back Phone Number? [phone]  ---------------------------------------------------------------------------  --------------  SCRIPT ANSWERS  Relationship to Patient? Spouse/Partner  Representative Name? Gisell Guevara  Is the [place] [employer] Life on the appropriate HIPAA document in Epic?  Yes

## 2023-02-27 ENCOUNTER — CARE COORDINATION (OUTPATIENT)
Dept: CARE COORDINATION | Age: 83
End: 2023-02-27

## 2023-02-27 NOTE — CARE COORDINATION
Ambulatory Care Coordination Note  2023    Patient Current Location:  Home: [address] [place]      ACM contacted the patient by telephone. Verified name and  with patient as identifiers. Provided introduction to self, and explanation of the ACM role. Challenges to be reviewed by the provider   Additional needs identified to be addressed with provider: No  none               Method of communication with provider: none. ACM: Reynaldo Flanagan RN    ACM contacted Alejandro Hicks. She states she is managing her own medications. She is using a weekly pill box. ACM requested Alejandro Hicks review medications using the bottles. She is agreeable. Alejandro Hicks and ACM went one by one through the medications and ACM educated patient on purpose of each medication. Her  Sal Tamayo came on the call also because Alejandro Hicks was getting anxious. The call was completed with both Sal Tamayo and Alejandro Hicks on speaker phone. Alejandro Hicks continued to actively participate in call. Per the [facility], all Nadyas  medications are filled and she is taking them daily. One medication will need a refill (duoneb). Alejandro Hicks has spoken with her pulmonology office regarding refills. They have checked with the pharmacy but pharmacy has not received the refill yet. The [facility] are agreeable to [facility] calling Dr. Breanne Wilhelm office to check on status. ACM reinforced Dr. Eliecer Reagan advice to take the least amount of xanax as possible and to have the last prescription last until beginning of April. Alejandro Hicks remains on 5L oxygen at all times. They are hopeful that she will qualify for portable oxygen at the next office visit. Alejandro Hicks states she is in the green zone on the COPD zone tool. She denies increased shortness of breath or cough. Alejandro Hicks has been using a wheeled walker in the house and a rollator when away from home. She has not experienced any falls. Plan  Check status of refill for duoneb-ACM contacted Dr. Breanne Wilhelm office.   They received they request last Friday and message was given to [facility] to contact pharmacy.   Check status of COPD  Continue medication education  Continue to follow for care coordination    Offered patient enrollment in the Remote Patient Monitoring (RPM) program for in-home monitoring: Patient is not eligible for RPM program.    Lab Results       None            Care Coordination Interventions    Referral from Primary Care Provider: Yes  Suggested Interventions and Community Resources  Medication Assistance Program: Completed (Comment: spiriva and trelogy)  Medi Set or Pill Pack: Declined  Zone Management Tools:  (Comment: COPD Zone Tool)          Goals Addressed    None         Future Appointments   Date Time Provider Yandel Huynh   3/20/2023 11:20 AM Griselda Staple, DO [facility] Pulm [facility]   4/11/2023  2:00 PM Unique Corrales DPM Col Podiatry [facility]   4/18/2023  1:30 PM Ondina Sellers  [address] [place] [address]

## 2023-03-14 ENCOUNTER — CARE COORDINATION (OUTPATIENT)
Dept: CARE COORDINATION | Age: 83
End: 2023-03-14

## 2023-03-20 ENCOUNTER — CARE COORDINATION (OUTPATIENT)
Dept: CARE COORDINATION | Age: 83
End: 2023-03-20

## 2023-03-20 ENCOUNTER — OFFICE VISIT (OUTPATIENT)
Dept: PULMONOLOGY | Age: 83
End: 2023-03-20
Payer: MEDICARE

## 2023-03-20 VITALS
OXYGEN SATURATION: 92 % | WEIGHT: 163 LBS | BODY MASS INDEX: 27.16 KG/M2 | DIASTOLIC BLOOD PRESSURE: 75 MMHG | HEART RATE: 75 BPM | TEMPERATURE: 97.5 F | SYSTOLIC BLOOD PRESSURE: 147 MMHG | RESPIRATION RATE: 18 BRPM | HEIGHT: 65 IN

## 2023-03-20 DIAGNOSIS — J96.11 CHRONIC RESPIRATORY FAILURE WITH HYPOXIA (HCC): ICD-10-CM

## 2023-03-20 DIAGNOSIS — J44.9 CHRONIC OBSTRUCTIVE PULMONARY DISEASE, UNSPECIFIED COPD TYPE (HCC): Primary | ICD-10-CM

## 2023-03-20 DIAGNOSIS — F41.9 ANXIETY: ICD-10-CM

## 2023-03-20 PROCEDURE — 3078F DIAST BP <80 MM HG: CPT | Performed by: INTERNAL MEDICINE

## 2023-03-20 PROCEDURE — 99213 OFFICE O/P EST LOW 20 MIN: CPT | Performed by: INTERNAL MEDICINE

## 2023-03-20 PROCEDURE — 1123F ACP DISCUSS/DSCN MKR DOCD: CPT | Performed by: INTERNAL MEDICINE

## 2023-03-20 PROCEDURE — 3074F SYST BP LT 130 MM HG: CPT | Performed by: INTERNAL MEDICINE

## 2023-03-20 RX ORDER — CITALOPRAM 20 MG/1
20 TABLET ORAL DAILY
COMMUNITY

## 2023-03-20 RX ORDER — SERTRALINE HYDROCHLORIDE 100 MG/1
100 TABLET, FILM COATED ORAL DAILY
Qty: 30 TABLET | Refills: 1 | Status: SHIPPED | OUTPATIENT
Start: 2023-03-20

## 2023-03-20 RX ORDER — PANTOPRAZOLE SODIUM 40 MG/1
40 TABLET, DELAYED RELEASE ORAL DAILY
COMMUNITY

## 2023-03-20 ASSESSMENT — ENCOUNTER SYMPTOMS: DYSPNEA ASSOCIATED WITH: EXERTION

## 2023-03-20 NOTE — PATIENT INSTRUCTIONS
[facility]  [address] [place] [address], [facility] Brigida [address]  Office: [phone]      Your were seen in the office today for COPD/chronic bronchitis      We  did not make changes to your medications today. Please bring all of your medications in with you to the next visit      Testing ordered today was none      Vaccines recommended none      We will call your regarding possibly ordering an inogen from [facility]        Please do not hesitate to call the office with any questions.

## 2023-03-20 NOTE — TELEPHONE ENCOUNTER
Last Appointment:  1/18/2023  Future Appointments   Date Time Provider Yandel Huynh   4/11/2023  2:00 PM Sindy Medrnao DPM Col Podiatry [facility]   4/18/2023  1:30 PM MD SHARONA [facility]   6/28/2023 10:00 AM DO RINA Castañeda Pulm [facility]

## 2023-03-20 NOTE — CARE COORDINATION
Ambulatory Care Coordination Note  3/20/2023    Patient Current Location:  Home: [address] [place] [address]     ACM contacted the patient by telephone. Verified name and  with patient as identifiers. Provided introduction to self, and explanation of the ACM role. Challenges to be reviewed by the provider   Additional needs identified to be addressed with provider: Yes    Medications:  needs refill of zoloft             Method of communication with provider: chart routing. ACM: Denys Quarles RN    ACM contacted Kennedi Reagan. ACM reviewed medications with Kennedi Reagan. She states she is in need of refill of zoloft. She denies any other medications needing refilled at this time as she picked up needed refills at pharmacy last week. ACM reinforced Dr. Nikkie Carrera advice to take the least amount of xanax as possible and to have the last prescription last until beginning of April. Kennedi Reagan remains on 5L oxygen at all times. They are hopeful that she will qualify for portable oxygen at her office visit with pulmonology today. Kennedi Reagan states she is in the green zone on the COPD zone tool. She denies increased shortness of breath or cough. Kennedi Reagan has been using a wheeled walker in the house and a rollator when away from home. She has not experienced any falls. Plan  Request refill from Dr. Sebastien Swenson (PCP)  Check status of COPD  Provide further medication education  Continue to follow for care coordination     Offered patient enrollment in the Remote Patient Monitoring (RPM) program for in-home monitoring:  previously declined .     Lab Results       None            Care Coordination Interventions    Referral from Primary Care Provider: Yes  Suggested Interventions and Community Resources  Medication Assistance Program: Completed (Comment: spiriva and trelogy)  Medi Set or Pill Pack: Declined  Zone Management Tools:  (Comment: COPD Zone Tool)          Goals Addressed                   This Visit's Progress

## 2023-03-22 NOTE — PROGRESS NOTES
Patient to follow up with physician in 3 months. Patient had trelegy refilled during office visit.
patient is a former smoker. Denies any occupational exposures. SURGICAL HISTORY:   Past Surgical History:   Procedure Laterality Date    JOINT REPLACEMENT Right 12/30/2019    RIGHT HIP    LEG AMPUTATION BELOW KNEE Left 01/02/2017       FAMILY HISTORY: No family history of cancer, blood clots     REVIEW OF SYSTEMS:  Constitutional: No fevers, chills, unintentional weight loss  Skin: No rashes or lesions  EENT: No change in vision, change in hearing, change in taste, change in smell  Cardiovascular: Denies chest pain, chest pressure, palpitations  Respiration: Positive for wheezing, coughing, shortness of breath with exertion. Gastrointestinal: Denies nausea, vomiting, diarrhea  Musculoskeletal: Denies joint or muscle pain  Neurological: Denies syncope, headache, seizures  Psychological: Denies anxiety or depression  Endocrine: Denies polyuria polydipsia  Hematopoietic/lymphatic: Denies easy bruising        PHYSICAL EXAMINATION:  Constitutional: Well-nourished, well-developed. No acute distress  EENT: PERRL, EOMI, no oropharyngeal erythema. No palpable adenopathy  Neck: Trachea and thyroid midline  Respiratory: Overall diminished bilaterally  Cardiovascular: Regular rate and rhythm, no murmurs rubs or gallops  Pulses: Equal bilaterally  Abdomen: Soft nontender bowel sounds present  Lymphatic: No palpable adenopathy  Musculoskeletal: Gait steady  Extremities: No clubbing, cyanosis, edema. Left BKA  Skin: No rashes or lesions  Neurological/Psychiatric: Neurologically intact, no focal deficits. Affect appropriate    DATA:   Prior pulmonary function testing from December 2022 reviewed. This was consistent with severe COPD with no significant bronchodilator response. Is severely reduced MVV along with possible air trapping. The DLCO is severely reduced and does not correct for alveolar ventilation. CT of the chest in August 2022 reviewed. This showed mild atelectasis and scarring in both lungs.   No

## 2023-04-18 ENCOUNTER — OFFICE VISIT (OUTPATIENT)
Dept: FAMILY MEDICINE CLINIC | Age: 83
End: 2023-04-18

## 2023-04-18 VITALS
HEART RATE: 66 BPM | TEMPERATURE: 98 F | WEIGHT: 165 LBS | DIASTOLIC BLOOD PRESSURE: 78 MMHG | BODY MASS INDEX: 27.46 KG/M2 | SYSTOLIC BLOOD PRESSURE: 138 MMHG | OXYGEN SATURATION: 95 %

## 2023-04-18 DIAGNOSIS — I10 PRIMARY HYPERTENSION: ICD-10-CM

## 2023-04-18 DIAGNOSIS — F41.9 ANXIETY: ICD-10-CM

## 2023-04-18 DIAGNOSIS — E78.00 PURE HYPERCHOLESTEROLEMIA: Primary | ICD-10-CM

## 2023-04-18 RX ORDER — AMLODIPINE BESYLATE 5 MG/1
5 TABLET ORAL DAILY
Qty: 90 TABLET | Refills: 1 | Status: SHIPPED | OUTPATIENT
Start: 2023-04-18

## 2023-04-18 RX ORDER — IPRATROPIUM BROMIDE AND ALBUTEROL SULFATE 2.5; .5 MG/3ML; MG/3ML
1 SOLUTION RESPIRATORY (INHALATION) 3 TIMES DAILY
Qty: 360 ML | Refills: 3 | Status: SHIPPED | OUTPATIENT
Start: 2023-04-18

## 2023-04-18 RX ORDER — SERTRALINE HYDROCHLORIDE 100 MG/1
100 TABLET, FILM COATED ORAL NIGHTLY
Qty: 90 TABLET | Refills: 1 | Status: SHIPPED | OUTPATIENT
Start: 2023-04-18

## 2023-04-18 RX ORDER — ACETAZOLAMIDE 250 MG/1
250 TABLET ORAL DAILY
Qty: 90 TABLET | Refills: 1 | Status: SHIPPED | OUTPATIENT
Start: 2023-04-18

## 2023-04-18 RX ORDER — LOSARTAN POTASSIUM 50 MG/1
50 TABLET ORAL DAILY
Qty: 90 TABLET | Refills: 1 | Status: SHIPPED | OUTPATIENT
Start: 2023-04-18

## 2023-04-18 RX ORDER — ALPRAZOLAM 0.25 MG/1
0.25 TABLET ORAL 2 TIMES DAILY
Qty: 60 TABLET | Refills: 0 | Status: SHIPPED | OUTPATIENT
Start: 2023-04-18 | End: 2023-05-18

## 2023-04-18 ASSESSMENT — ENCOUNTER SYMPTOMS
COUGH: 0
ABDOMINAL PAIN: 0
VOMITING: 0
RHINORRHEA: 0
SHORTNESS OF BREATH: 1
NAUSEA: 0

## 2023-04-18 NOTE — PROGRESS NOTES
[facility]  Office Progress Note  Dr. Mei Johnson      Patient:  Jules Pugh [de-identified] y.o. female     Date of Service: 4/18/23      Chief complaint:   Chief Complaint   Patient presents with    Anxiety         History of Present Illness   The patient is a [de-identified] y.o. female  here to follow up of their anxiety    Anxiety is doing \"pretty good\"  -tolerating the zoloft well  -she uses low dose xanax up to once a day  -denies side effects  -both patient and  report significant improvement with the way she reacts to events, ability to travel and overall baseline anxiety    COPD-follows with pulm. Stable. ON trelegy. No new increased oxygen demands. HTN and HLD  -on norvasc, metoprolol, cozaar and fish oil. She has aged out of ascvd risk assessment. Denies chest pain, shortness of breath, leg swelling, headache, vision changes and orthopnea  BP Readings from Last 3 Encounters:   04/18/23 138/78   03/20/23 (!) 147/75   01/18/23 138/84       Past Medical History:      Diagnosis Date    Anxiety     COPD (chronic obstructive pulmonary disease) (HCC)     Hx of blood clots        Review of Systems:   Review of Systems   Constitutional:  Negative for chills and fever. HENT:  Negative for congestion and rhinorrhea. Respiratory:  Positive for shortness of breath. Negative for cough. Cardiovascular:  Negative for chest pain and leg swelling. Gastrointestinal:  Negative for abdominal pain, nausea and vomiting. Genitourinary:  Negative for dysuria and hematuria. Musculoskeletal:  Negative for arthralgias and myalgias. Skin:  Negative for rash and wound. Neurological:  Negative for dizziness and light-headedness. Physical Exam   Vitals: /78   Pulse 66   Temp 98 °F (36.7 °C)   Wt 165 lb (74.8 kg)   SpO2 95% Comment: 5 liters  BMI 27.46 kg/m²   Physical Exam    General:  Well developed, well nourished, well groomed. No apparent distress. HEENT:  Normocephalic, atraumatic.   No

## 2023-04-20 ENCOUNTER — CARE COORDINATION (OUTPATIENT)
Dept: CARE COORDINATION | Age: 83
End: 2023-04-20

## 2023-04-20 DIAGNOSIS — J96.11 CHRONIC RESPIRATORY FAILURE WITH HYPOXIA (HCC): ICD-10-CM

## 2023-04-20 DIAGNOSIS — J44.9 CHRONIC OBSTRUCTIVE PULMONARY DISEASE, UNSPECIFIED COPD TYPE (HCC): Primary | ICD-10-CM

## 2023-04-24 DIAGNOSIS — J96.11 CHRONIC RESPIRATORY FAILURE WITH HYPOXIA (HCC): ICD-10-CM

## 2023-04-24 DIAGNOSIS — J44.9 CHRONIC OBSTRUCTIVE PULMONARY DISEASE, UNSPECIFIED COPD TYPE (HCC): Primary | ICD-10-CM

## 2023-04-24 NOTE — CARE COORDINATION
Ambulatory Care Coordination Note  2023    Patient Current Location:  Home: [address] [place] [address]     ACM contacted the patient by telephone. Verified name and  with patient as identifiers. Provided introduction to self, and explanation of the ACM role. Challenges to be reviewed by the provider   Additional needs identified to be addressed with provider: No  none               Method of communication with provider: none. ACM: Luciano Rodrigues RN    ACM contacted Cristina Benitez. She states she qualified for portable oxygen and will be able to go on her upcoming cruise. She states she uses her duonebs twice daily and is checking her pulse ox daily. Her reading this morning was 94% on 5L of oxygen. Cristina Benitez states she does not have increased shortness of breath, however, she readily admits that she is very inactive. ACM encouraged Cristina Benitez to increase her activities and pace them accordingly. She verbalized understanding. Patient will have her annual insurance wellness check next week. Cristina Benitez states she has not suffered any recent falls. Reviewed fall precautions with Cristina Benitez:   1)Getting up slowly when changing positions  2)Keeping pathways clear  3)Keep house well lit  4)Ambulate with assistive device  5)Keep a phone/life alert with you when ambulating    Patient feels she is able to manage her health care needs without further assistance from [employer], and feels she can graduate from Care Coordination. ACM educated patient that if she needs additional help in managing her health care in the future, she can call AC to re-enroll in Care Coordination. Plan  Graduate care coordination      Offered patient enrollment in the Remote Patient Monitoring (RPM) program for in-home monitoring:  patient is graduating care coordination .     Lab Results       None            Care Coordination Interventions    Referral from Primary Care Provider: Yes  Suggested Interventions and Community Resources  Medication

## 2023-05-12 ENCOUNTER — PROCEDURE VISIT (OUTPATIENT)
Dept: PODIATRY | Age: 83
End: 2023-05-12

## 2023-05-12 DIAGNOSIS — Z79.01 ENCOUNTER FOR CURRENT LONG-TERM USE OF ANTICOAGULANTS: Primary | ICD-10-CM

## 2023-05-12 DIAGNOSIS — G60.8 HEREDITARY SENSORY NEUROPATHY: ICD-10-CM

## 2023-05-12 DIAGNOSIS — B35.1 TINEA UNGUIUM: ICD-10-CM

## 2023-05-12 DIAGNOSIS — L85.3 XEROSIS CUTIS: ICD-10-CM

## 2023-05-12 DIAGNOSIS — Z89.512 HISTORY OF BELOW-KNEE AMPUTATION OF LEFT LOWER EXTREMITY (HCC): ICD-10-CM

## 2023-05-16 DIAGNOSIS — E78.00 PURE HYPERCHOLESTEROLEMIA: ICD-10-CM

## 2023-05-16 DIAGNOSIS — I10 PRIMARY HYPERTENSION: ICD-10-CM

## 2023-05-16 LAB
ALBUMIN SERPL-MCNC: 4.4 G/DL (ref 3.5–5.2)
ALP SERPL-CCNC: 79 U/L (ref 35–104)
ALT SERPL-CCNC: 8 U/L (ref 0–32)
ANION GAP SERPL CALCULATED.3IONS-SCNC: 18 MMOL/L (ref 7–16)
ANISOCYTOSIS: ABNORMAL
AST SERPL-CCNC: 21 U/L (ref 0–31)
BASOPHILIC STIPPLING: ABNORMAL
BASOPHILS # BLD: 0.04 E9/L (ref 0–0.2)
BASOPHILS NFR BLD: 0.5 % (ref 0–2)
BILIRUB SERPL-MCNC: <0.2 MG/DL (ref 0–1.2)
BUN SERPL-MCNC: 16 MG/DL (ref 6–23)
CALCIUM SERPL-MCNC: 9.7 MG/DL (ref 8.6–10.2)
CHLORIDE SERPL-SCNC: 103 MMOL/L (ref 98–107)
CHOLESTEROL, TOTAL: 237 MG/DL (ref 0–199)
CO2 SERPL-SCNC: 21 MMOL/L (ref 22–29)
CREAT SERPL-MCNC: 0.9 MG/DL (ref 0.5–1)
EOSINOPHIL # BLD: 0.18 E9/L (ref 0.05–0.5)
EOSINOPHIL NFR BLD: 2.3 % (ref 0–6)
ERYTHROCYTE [DISTWIDTH] IN BLOOD BY AUTOMATED COUNT: 15.9 FL (ref 11.5–15)
GLUCOSE SERPL-MCNC: 128 MG/DL (ref 74–99)
HCT VFR BLD AUTO: 30.7 % (ref 34–48)
HDLC SERPL-MCNC: 76 MG/DL
HGB BLD-MCNC: 8.3 G/DL (ref 11.5–15.5)
HYPOCHROMIA: ABNORMAL
IMM GRANULOCYTES # BLD: 0.14 E9/L
IMM GRANULOCYTES NFR BLD: 1.8 % (ref 0–5)
LDLC SERPL CALC-MCNC: 134 MG/DL (ref 0–99)
LYMPHOCYTES # BLD: 1.55 E9/L (ref 1.5–4)
LYMPHOCYTES NFR BLD: 19.8 % (ref 20–42)
MCH RBC QN AUTO: 24.9 PG (ref 26–35)
MCHC RBC AUTO-ENTMCNC: 27 % (ref 32–34.5)
MCV RBC AUTO: 91.9 FL (ref 80–99.9)
MONOCYTES # BLD: 0.62 E9/L (ref 0.1–0.95)
MONOCYTES NFR BLD: 7.9 % (ref 2–12)
NEUTROPHILS # BLD: 5.29 E9/L (ref 1.8–7.3)
NEUTS SEG NFR BLD: 67.7 % (ref 43–80)
OVALOCYTES: ABNORMAL
PLATELET # BLD AUTO: 280 E9/L (ref 130–450)
PMV BLD AUTO: 9.2 FL (ref 7–12)
POIKILOCYTES: ABNORMAL
POLYCHROMASIA: ABNORMAL
POTASSIUM SERPL-SCNC: 3.8 MMOL/L (ref 3.5–5)
PROT SERPL-MCNC: 7.4 G/DL (ref 6.4–8.3)
RBC # BLD AUTO: 3.34 E12/L (ref 3.5–5.5)
SODIUM SERPL-SCNC: 142 MMOL/L (ref 132–146)
TEAR DROP CELLS: ABNORMAL
TRIGL SERPL-MCNC: 134 MG/DL (ref 0–149)
VLDLC SERPL CALC-MCNC: 27 MG/DL
WBC # BLD: 7.8 E9/L (ref 4.5–11.5)

## 2023-06-06 ENCOUNTER — OFFICE VISIT (OUTPATIENT)
Dept: FAMILY MEDICINE CLINIC | Age: 83
End: 2023-06-06
Payer: MEDICARE

## 2023-06-06 VITALS
BODY MASS INDEX: 27.12 KG/M2 | TEMPERATURE: 97.5 F | OXYGEN SATURATION: 92 % | WEIGHT: 163 LBS | SYSTOLIC BLOOD PRESSURE: 128 MMHG | DIASTOLIC BLOOD PRESSURE: 70 MMHG | HEART RATE: 88 BPM

## 2023-06-06 DIAGNOSIS — D64.9 ANEMIA, UNSPECIFIED TYPE: ICD-10-CM

## 2023-06-06 DIAGNOSIS — R53.83 OTHER FATIGUE: Primary | ICD-10-CM

## 2023-06-06 DIAGNOSIS — F41.9 ANXIETY: ICD-10-CM

## 2023-06-06 DIAGNOSIS — R53.83 OTHER FATIGUE: ICD-10-CM

## 2023-06-06 PROCEDURE — 3078F DIAST BP <80 MM HG: CPT | Performed by: STUDENT IN AN ORGANIZED HEALTH CARE EDUCATION/TRAINING PROGRAM

## 2023-06-06 PROCEDURE — 99214 OFFICE O/P EST MOD 30 MIN: CPT | Performed by: STUDENT IN AN ORGANIZED HEALTH CARE EDUCATION/TRAINING PROGRAM

## 2023-06-06 PROCEDURE — 1123F ACP DISCUSS/DSCN MKR DOCD: CPT | Performed by: STUDENT IN AN ORGANIZED HEALTH CARE EDUCATION/TRAINING PROGRAM

## 2023-06-06 PROCEDURE — 3074F SYST BP LT 130 MM HG: CPT | Performed by: STUDENT IN AN ORGANIZED HEALTH CARE EDUCATION/TRAINING PROGRAM

## 2023-06-06 RX ORDER — ALPRAZOLAM 0.25 MG/1
0.25 TABLET ORAL 2 TIMES DAILY
Qty: 60 TABLET | Refills: 0 | Status: CANCELLED | OUTPATIENT
Start: 2023-06-06 | End: 2023-07-06

## 2023-06-06 ASSESSMENT — ENCOUNTER SYMPTOMS
ABDOMINAL PAIN: 0
COUGH: 0
RHINORRHEA: 0
SHORTNESS OF BREATH: 1
VOMITING: 0
NAUSEA: 0

## 2023-06-06 NOTE — PROGRESS NOTES
[facility]  Office Progress Note  Dr. Taylor Ferreira      Patient:  Shar Arcos [de-identified] y.o. female     Date of Service: 6/6/23      Chief complaint:   Chief Complaint   Patient presents with    Discuss Labs    Depression     Discuss zoloft and xanax ( is not letting her take it)         History of Present Illness   The patient is a [de-identified] y.o. female  here to follow up of their anemia/depression and anxiety    Depression not well controlled,  was not letting her have the xanax as he was concerned about side effects and felt like it was making her too fatigued/less active. Still has significant anxiety, on zoloft, does feel like it helps some. Wondering if increasing would be appropriate    Recent blood work is showing anemia. Denies any bleeding, dark/tarry stool. No on any NSAID. Has had a GI bleed in the past. On protoinx,  no heartburn symptoms    Past Medical History:      Diagnosis Date    Anxiety     COPD (chronic obstructive pulmonary disease) (HCC)     Hx of blood clots        Review of Systems:   Review of Systems   Constitutional:  Positive for fatigue. Negative for chills and fever. HENT:  Negative for congestion and rhinorrhea. Respiratory:  Positive for shortness of breath (chronic). Negative for cough. Cardiovascular:  Negative for chest pain and leg swelling. Gastrointestinal:  Negative for abdominal pain, nausea and vomiting. Genitourinary:  Negative for dysuria and hematuria. Musculoskeletal:  Negative for arthralgias and myalgias. Skin:  Negative for rash and wound. Neurological:  Negative for dizziness and light-headedness. Psychiatric/Behavioral:  The patient is nervous/anxious. Physical Exam   Vitals: /70   Pulse 88   Temp 97.5 °F (36.4 °C)   Wt 163 lb (73.9 kg)   SpO2 92% Comment: 5 liters  BMI 27.12 kg/m²   Physical Exam    General:    No apparent distress. On chornic oxygen therapy  HEENT:  Normocephalic, atraumatic.   No scleral

## 2023-06-07 ENCOUNTER — TELEPHONE (OUTPATIENT)
Dept: FAMILY MEDICINE CLINIC | Age: 83
End: 2023-06-07

## 2023-06-07 LAB
ALBUMIN SERPL-MCNC: 4.4 G/DL (ref 3.5–5.2)
ALP SERPL-CCNC: 75 U/L (ref 35–104)
ALT SERPL-CCNC: 9 U/L (ref 0–32)
ANION GAP SERPL CALCULATED.3IONS-SCNC: 13 MMOL/L (ref 7–16)
ANISOCYTOSIS: ABNORMAL
AST SERPL-CCNC: 18 U/L (ref 0–31)
BASOPHILIC STIPPLING: ABNORMAL
BASOPHILS # BLD: 0.09 E9/L (ref 0–0.2)
BASOPHILS NFR BLD: 0.9 % (ref 0–2)
BILIRUB SERPL-MCNC: <0.2 MG/DL (ref 0–1.2)
BUN SERPL-MCNC: 18 MG/DL (ref 6–23)
CALCIUM SERPL-MCNC: 9.7 MG/DL (ref 8.6–10.2)
CHLORIDE SERPL-SCNC: 104 MMOL/L (ref 98–107)
CO2 SERPL-SCNC: 24 MMOL/L (ref 22–29)
CREAT SERPL-MCNC: 0.9 MG/DL (ref 0.5–1)
EOSINOPHIL # BLD: 0.14 E9/L (ref 0.05–0.5)
EOSINOPHIL NFR BLD: 1.4 % (ref 0–6)
ERYTHROCYTE [DISTWIDTH] IN BLOOD BY AUTOMATED COUNT: 16 FL (ref 11.5–15)
FOLATE SERPL-MCNC: 8.9 NG/ML (ref 4.8–24.2)
GLUCOSE SERPL-MCNC: 104 MG/DL (ref 74–99)
HCT VFR BLD AUTO: 34.7 % (ref 34–48)
HGB BLD-MCNC: 9 G/DL (ref 11.5–15.5)
HYPOCHROMIA: ABNORMAL
IMM GRANULOCYTES # BLD: 0.04 E9/L
IMM GRANULOCYTES NFR BLD: 0.4 % (ref 0–5)
IRON SATN MFR SERPL: 9 % (ref 15–50)
IRON SERPL-MCNC: 36 MCG/DL (ref 37–145)
LYMPHOCYTES # BLD: 1.49 E9/L (ref 1.5–4)
LYMPHOCYTES NFR BLD: 15.1 % (ref 20–42)
MCH RBC QN AUTO: 23.9 PG (ref 26–35)
MCHC RBC AUTO-ENTMCNC: 25.9 % (ref 32–34.5)
MCV RBC AUTO: 92 FL (ref 80–99.9)
MONOCYTES # BLD: 0.62 E9/L (ref 0.1–0.95)
MONOCYTES NFR BLD: 6.3 % (ref 2–12)
NEUTROPHILS # BLD: 7.48 E9/L (ref 1.8–7.3)
NEUTS SEG NFR BLD: 75.9 % (ref 43–80)
OVALOCYTES: ABNORMAL
PLATELET # BLD AUTO: 278 E9/L (ref 130–450)
PMV BLD AUTO: 9.7 FL (ref 7–12)
POIKILOCYTES: ABNORMAL
POLYCHROMASIA: ABNORMAL
POTASSIUM SERPL-SCNC: 4.2 MMOL/L (ref 3.5–5)
PROT SERPL-MCNC: 7.4 G/DL (ref 6.4–8.3)
RBC # BLD AUTO: 3.77 E12/L (ref 3.5–5.5)
SODIUM SERPL-SCNC: 141 MMOL/L (ref 132–146)
TIBC SERPL-MCNC: 400 MCG/DL (ref 250–450)
TSH SERPL-MCNC: 2.24 UIU/ML (ref 0.27–4.2)
VIT B12 SERPL-MCNC: >2000 PG/ML (ref 211–946)
WBC # BLD: 9.9 E9/L (ref 4.5–11.5)

## 2023-06-07 NOTE — TELEPHONE ENCOUNTER
[facility]     He is asking about the Sertraline 50mg script sent yesterday. The patient already has a script for 100mg. Is she supposed to be taking both?

## 2023-06-29 ENCOUNTER — TELEPHONE (OUTPATIENT)
Dept: PULMONOLOGY | Age: 83
End: 2023-06-29

## 2023-06-29 DIAGNOSIS — F41.9 ANXIETY: ICD-10-CM

## 2023-06-29 DIAGNOSIS — J44.9 CHRONIC OBSTRUCTIVE PULMONARY DISEASE, UNSPECIFIED COPD TYPE (HCC): Primary | ICD-10-CM

## 2023-06-29 DIAGNOSIS — J96.11 CHRONIC RESPIRATORY FAILURE WITH HYPOXIA (HCC): ICD-10-CM

## 2023-06-29 RX ORDER — ALBUTEROL SULFATE 90 UG/1
2 AEROSOL, METERED RESPIRATORY (INHALATION) 4 TIMES DAILY PRN
Qty: 54 G | Refills: 1 | Status: SHIPPED | OUTPATIENT
Start: 2023-06-29

## 2023-06-30 RX ORDER — ALPRAZOLAM 0.25 MG/1
0.25 TABLET ORAL 2 TIMES DAILY
Qty: 60 TABLET | Refills: 0 | Status: SHIPPED | OUTPATIENT
Start: 2023-06-30 | End: 2023-07-30

## 2023-07-05 ENCOUNTER — CARE COORDINATION (OUTPATIENT)
Dept: CARE COORDINATION | Age: 83
End: 2023-07-05

## 2023-07-05 NOTE — CARE COORDINATION
Shirin Liang contacted [facility]. She states she is having difficulty getting through to the office to request a refill on Cozaar. [facility] will send message to clinical staff.

## 2023-07-18 ENCOUNTER — OFFICE VISIT (OUTPATIENT)
Dept: FAMILY MEDICINE CLINIC | Age: 83
End: 2023-07-18
Payer: MEDICARE

## 2023-07-18 VITALS
BODY MASS INDEX: 26.66 KG/M2 | SYSTOLIC BLOOD PRESSURE: 126 MMHG | OXYGEN SATURATION: 96 % | RESPIRATION RATE: 18 BRPM | HEART RATE: 84 BPM | HEIGHT: 65 IN | DIASTOLIC BLOOD PRESSURE: 84 MMHG | TEMPERATURE: 98.8 F | WEIGHT: 160 LBS

## 2023-07-18 DIAGNOSIS — F41.9 ANXIETY: ICD-10-CM

## 2023-07-18 DIAGNOSIS — I10 PRIMARY HYPERTENSION: Primary | ICD-10-CM

## 2023-07-18 DIAGNOSIS — D50.9 IRON DEFICIENCY ANEMIA, UNSPECIFIED IRON DEFICIENCY ANEMIA TYPE: ICD-10-CM

## 2023-07-18 PROCEDURE — 3074F SYST BP LT 130 MM HG: CPT | Performed by: STUDENT IN AN ORGANIZED HEALTH CARE EDUCATION/TRAINING PROGRAM

## 2023-07-18 PROCEDURE — 3079F DIAST BP 80-89 MM HG: CPT | Performed by: STUDENT IN AN ORGANIZED HEALTH CARE EDUCATION/TRAINING PROGRAM

## 2023-07-18 PROCEDURE — 99214 OFFICE O/P EST MOD 30 MIN: CPT | Performed by: STUDENT IN AN ORGANIZED HEALTH CARE EDUCATION/TRAINING PROGRAM

## 2023-07-18 PROCEDURE — 1123F ACP DISCUSS/DSCN MKR DOCD: CPT | Performed by: STUDENT IN AN ORGANIZED HEALTH CARE EDUCATION/TRAINING PROGRAM

## 2023-07-18 RX ORDER — ALPRAZOLAM 0.25 MG/1
0.25 TABLET ORAL 2 TIMES DAILY
Qty: 60 TABLET | Refills: 0 | Status: SHIPPED | OUTPATIENT
Start: 2023-07-18 | End: 2023-08-17

## 2023-07-19 ENCOUNTER — TELEPHONE (OUTPATIENT)
Dept: FAMILY MEDICINE CLINIC | Age: 83
End: 2023-07-19

## 2023-07-19 NOTE — TELEPHONE ENCOUNTER
Patient called in and stated [facility] did not have her Rx for xanax ordered yesterday. Patient stated she is leaving for vacation on Friday and will not have enough medication for the trip. Called pharmacy and spoke with [facility]. [facility] stated that patient does have Rx on file but it is 11 days too soon to refill and patient would have to pay out of pocket. Is it ok to refill xanax early?

## 2023-08-08 ENCOUNTER — CARE COORDINATION (OUTPATIENT)
Dept: CARE COORDINATION | Age: 83
End: 2023-08-08

## 2023-08-08 DIAGNOSIS — F41.9 ANXIETY: ICD-10-CM

## 2023-08-08 NOTE — CARE COORDINATION
TIGIST received phone call from [employer]. She states she is in need of medication refills and has been unable to contact the office. TIGIST contacted office and spoke with Ronni Fisher. Ronni Fisher states she will call [employer].

## 2023-08-08 NOTE — TELEPHONE ENCOUNTER
Aida Srivastava calling for a new script for Zoloft 50mg sent to [facility]. This was sent last month for 30 pills with no refills. Do you want her to stay on this medication, or was this a temporary fill?        Last Appointment:  7/18/2023  Future Appointments   Date Time Provider [address]   8/10/2023 10:00 AM Edin Cary DO [facility]   8/29/2023 11:00 AM Queta Nelson MD [address] [place] [address]

## 2023-08-09 NOTE — TELEPHONE ENCOUNTER
Refill sent for the 50 mg pill. If she is not having side effects form it I want her to continue.     Just to be clear I want her taking the 50 mg zoloft in addition to the 100 mg  for a total of 150 mg of zoloft

## 2023-08-10 ENCOUNTER — OFFICE VISIT (OUTPATIENT)
Dept: PULMONOLOGY | Age: 83
End: 2023-08-10

## 2023-08-10 VITALS
WEIGHT: 163.6 LBS | SYSTOLIC BLOOD PRESSURE: 179 MMHG | HEIGHT: 63 IN | OXYGEN SATURATION: 94 % | BODY MASS INDEX: 28.99 KG/M2 | DIASTOLIC BLOOD PRESSURE: 71 MMHG | TEMPERATURE: 97.7 F | HEART RATE: 76 BPM | RESPIRATION RATE: 40 BRPM

## 2023-08-10 DIAGNOSIS — J96.11 CHRONIC RESPIRATORY FAILURE WITH HYPOXIA (HCC): ICD-10-CM

## 2023-08-10 DIAGNOSIS — J44.9 CHRONIC OBSTRUCTIVE PULMONARY DISEASE, UNSPECIFIED COPD TYPE (HCC): Primary | ICD-10-CM

## 2023-08-10 RX ORDER — ALBUTEROL SULFATE 90 UG/1
2 AEROSOL, METERED RESPIRATORY (INHALATION) 4 TIMES DAILY PRN
Qty: 54 G | Refills: 3 | Status: SHIPPED | OUTPATIENT
Start: 2023-08-10

## 2023-08-10 NOTE — PATIENT INSTRUCTIONS
[address] [place] [address]  Consuelo Huber N [facility]  Office: [phone]      Your were seen in the office today for COPD/chronic bronchitis      We  did not make changes to your medications today. Wean oxygen while at rest to keep saturation 90% or more. Testing ordered today was none      Vaccines recommended influenza, RSV    Please do not hesitate to call the office with any questions.

## 2023-08-10 NOTE — PROGRESS NOTES
Patient to follow up with physician in 6 months. Patient given information about the new RSV vaccine.
tablet by mouth daily      Omega-3 Fatty Acids (FISH OIL PO) Take by mouth daily      Cyanocobalamin (VITAMIN B-12 PO) Take by mouth daily      Cholecalciferol (VITAMIN D) 50 MCG (2000 UT) CAPS capsule Take by mouth daily      OXYGEN Inhale into the lungs 6 LITERS      ferrous sulfate (IRON 325) 325 (65 Fe) MG tablet Take 1 tablet by mouth Twice a Week       No current facility-administered medications for this visit. SOCIAL AND OCCUPATIONAL HEALTH: The patient is a former smoker. Denies any occupational exposures. SURGICAL HISTORY:   Past Surgical History:   Procedure Laterality Date    JOINT REPLACEMENT Right 12/30/2019    RIGHT HIP    LEG AMPUTATION BELOW KNEE Left 01/02/2017       FAMILY HISTORY: No family history of cancer, blood clots     REVIEW OF SYSTEMS:  Constitutional: No fevers, chills, unintentional weight loss  Skin: No rashes or lesions  EENT: No change in vision, change in hearing, change in taste, change in smell  Cardiovascular: Denies chest pain, chest pressure, palpitations  Respiration: Positive for wheezing, coughing, shortness of breath with exertion. Gastrointestinal: Denies nausea, vomiting, diarrhea  Musculoskeletal: Denies joint or muscle pain  Neurological: Denies syncope, headache, seizures  Psychological: Denies anxiety or depression  Endocrine: Denies polyuria polydipsia  Hematopoietic/lymphatic: Denies easy bruising        PHYSICAL EXAMINATION:  Constitutional: Well-nourished, well-developed. No acute distress  EENT: PERRL, EOMI, no oropharyngeal erythema. No palpable adenopathy  Neck: Trachea and thyroid midline  Respiratory: Overall diminished bilaterally  Cardiovascular: Regular rate and rhythm, no murmurs rubs or gallops  Pulses: Equal bilaterally  Abdomen: Soft nontender bowel sounds present  Lymphatic: No palpable adenopathy  Musculoskeletal: Gait steady  Extremities: No clubbing, cyanosis, edema.   Left BKA  Skin: No rashes or lesions  Neurological/Psychiatric:

## 2023-08-29 ENCOUNTER — OFFICE VISIT (OUTPATIENT)
Dept: FAMILY MEDICINE CLINIC | Age: 83
End: 2023-08-29
Payer: MEDICARE

## 2023-08-29 VITALS
WEIGHT: 165 LBS | HEART RATE: 68 BPM | BODY MASS INDEX: 29.7 KG/M2 | SYSTOLIC BLOOD PRESSURE: 124 MMHG | TEMPERATURE: 97.5 F | OXYGEN SATURATION: 97 % | DIASTOLIC BLOOD PRESSURE: 70 MMHG

## 2023-08-29 DIAGNOSIS — F41.9 ANXIETY: ICD-10-CM

## 2023-08-29 DIAGNOSIS — I10 PRIMARY HYPERTENSION: ICD-10-CM

## 2023-08-29 PROCEDURE — 99214 OFFICE O/P EST MOD 30 MIN: CPT | Performed by: STUDENT IN AN ORGANIZED HEALTH CARE EDUCATION/TRAINING PROGRAM

## 2023-08-29 PROCEDURE — 3074F SYST BP LT 130 MM HG: CPT | Performed by: STUDENT IN AN ORGANIZED HEALTH CARE EDUCATION/TRAINING PROGRAM

## 2023-08-29 PROCEDURE — 1123F ACP DISCUSS/DSCN MKR DOCD: CPT | Performed by: STUDENT IN AN ORGANIZED HEALTH CARE EDUCATION/TRAINING PROGRAM

## 2023-08-29 PROCEDURE — 3078F DIAST BP <80 MM HG: CPT | Performed by: STUDENT IN AN ORGANIZED HEALTH CARE EDUCATION/TRAINING PROGRAM

## 2023-08-29 RX ORDER — LOSARTAN POTASSIUM 50 MG/1
50 TABLET ORAL DAILY
Qty: 90 TABLET | Refills: 3 | Status: SHIPPED | OUTPATIENT
Start: 2023-08-29

## 2023-08-29 RX ORDER — IPRATROPIUM BROMIDE AND ALBUTEROL SULFATE 2.5; .5 MG/3ML; MG/3ML
1 SOLUTION RESPIRATORY (INHALATION) 3 TIMES DAILY
Qty: 360 ML | Refills: 3 | Status: SHIPPED | OUTPATIENT
Start: 2023-08-29

## 2023-08-29 RX ORDER — SERTRALINE HYDROCHLORIDE 100 MG/1
100 TABLET, FILM COATED ORAL NIGHTLY
Qty: 90 TABLET | Refills: 3 | Status: SHIPPED | OUTPATIENT
Start: 2023-08-29

## 2023-08-29 RX ORDER — ACETAZOLAMIDE 250 MG/1
250 TABLET ORAL DAILY
Qty: 90 TABLET | Refills: 3 | Status: SHIPPED | OUTPATIENT
Start: 2023-08-29

## 2023-08-29 RX ORDER — ALPRAZOLAM 0.25 MG/1
0.25 TABLET ORAL 2 TIMES DAILY
Qty: 60 TABLET | Refills: 0 | Status: SHIPPED | OUTPATIENT
Start: 2023-08-29 | End: 2023-09-28

## 2023-08-29 RX ORDER — AMLODIPINE BESYLATE 5 MG/1
5 TABLET ORAL DAILY
Qty: 90 TABLET | Refills: 3 | Status: SHIPPED | OUTPATIENT
Start: 2023-08-29

## 2023-08-29 ASSESSMENT — ENCOUNTER SYMPTOMS
SHORTNESS OF BREATH: 0
ABDOMINAL PAIN: 0
RHINORRHEA: 0
VOMITING: 0
NAUSEA: 0
COUGH: 0

## 2023-08-29 NOTE — PROGRESS NOTES
the use of voice recognition software. Although effort is taken to assure the accuracy ofthis document, it is possible that grammatical, syntax, or spelling errors may occur.

## 2023-09-27 DIAGNOSIS — F41.9 ANXIETY: ICD-10-CM

## 2023-09-27 NOTE — TELEPHONE ENCOUNTER
Terra Cover calling for a new script for her for Alprazolam to be sent to [facility].       Last Appointment:  8/29/2023  Future Appointments   Date Time Provider [address]   11/21/2023 11:00 AM Selena Bennett MD [facility]   2/12/2024 10:00 AM Sherren Sicard, DO [facility]

## 2023-09-28 RX ORDER — ALPRAZOLAM 0.25 MG/1
0.25 TABLET ORAL 2 TIMES DAILY
Qty: 60 TABLET | Refills: 0 | Status: SHIPPED | OUTPATIENT
Start: 2023-09-28 | End: 2023-10-28

## 2023-10-10 ENCOUNTER — TELEPHONE (OUTPATIENT)
Dept: PULMONOLOGY | Age: 83
End: 2023-10-10

## 2023-10-10 DIAGNOSIS — J96.11 CHRONIC RESPIRATORY FAILURE WITH HYPOXIA (HCC): ICD-10-CM

## 2023-10-10 DIAGNOSIS — J44.9 CHRONIC OBSTRUCTIVE PULMONARY DISEASE, UNSPECIFIED COPD TYPE (HCC): Primary | ICD-10-CM

## 2023-10-26 DIAGNOSIS — F41.9 ANXIETY: ICD-10-CM

## 2023-10-27 DIAGNOSIS — F41.9 ANXIETY: ICD-10-CM

## 2023-10-27 RX ORDER — ALPRAZOLAM 0.25 MG/1
0.25 TABLET ORAL 2 TIMES DAILY
Qty: 60 TABLET | Refills: 0 | Status: SHIPPED | OUTPATIENT
Start: 2023-10-27 | End: 2023-11-26

## 2023-10-27 RX ORDER — ALPRAZOLAM 0.25 MG/1
0.25 TABLET ORAL 2 TIMES DAILY
Qty: 60 TABLET | Refills: 0 | OUTPATIENT
Start: 2023-10-27

## 2023-11-21 ENCOUNTER — OFFICE VISIT (OUTPATIENT)
Dept: FAMILY MEDICINE CLINIC | Age: 83
End: 2023-11-21
Payer: MEDICARE

## 2023-11-21 VITALS
BODY MASS INDEX: 30.24 KG/M2 | OXYGEN SATURATION: 94 % | DIASTOLIC BLOOD PRESSURE: 68 MMHG | TEMPERATURE: 96.4 F | HEART RATE: 68 BPM | WEIGHT: 168 LBS | SYSTOLIC BLOOD PRESSURE: 124 MMHG

## 2023-11-21 DIAGNOSIS — Z00.00 MEDICARE ANNUAL WELLNESS VISIT, SUBSEQUENT: Primary | ICD-10-CM

## 2023-11-21 DIAGNOSIS — D50.9 IRON DEFICIENCY ANEMIA, UNSPECIFIED IRON DEFICIENCY ANEMIA TYPE: ICD-10-CM

## 2023-11-21 DIAGNOSIS — F41.9 ANXIETY: ICD-10-CM

## 2023-11-21 LAB
ALBUMIN SERPL-MCNC: 4.5 G/DL (ref 3.5–5.2)
ALP BLD-CCNC: 66 U/L (ref 35–104)
ALT SERPL-CCNC: 8 U/L (ref 0–32)
ANION GAP SERPL CALCULATED.3IONS-SCNC: 12 MMOL/L (ref 7–16)
AST SERPL-CCNC: 15 U/L (ref 0–31)
BILIRUB SERPL-MCNC: <0.2 MG/DL (ref 0–1.2)
BUN BLDV-MCNC: 20 MG/DL (ref 6–23)
CALCIUM SERPL-MCNC: 9.5 MG/DL (ref 8.6–10.2)
CHLORIDE BLD-SCNC: 106 MMOL/L (ref 98–107)
CO2: 26 MMOL/L (ref 22–29)
CREAT SERPL-MCNC: 0.9 MG/DL (ref 0.5–1)
GFR SERPL CREATININE-BSD FRML MDRD: >60 ML/MIN/1.73M2
GLUCOSE BLD-MCNC: 107 MG/DL (ref 74–99)
POTASSIUM SERPL-SCNC: 4.3 MMOL/L (ref 3.5–5)
SODIUM BLD-SCNC: 144 MMOL/L (ref 132–146)
TOTAL PROTEIN: 7.1 G/DL (ref 6.4–8.3)

## 2023-11-21 PROCEDURE — 3078F DIAST BP <80 MM HG: CPT | Performed by: STUDENT IN AN ORGANIZED HEALTH CARE EDUCATION/TRAINING PROGRAM

## 2023-11-21 PROCEDURE — 3074F SYST BP LT 130 MM HG: CPT | Performed by: STUDENT IN AN ORGANIZED HEALTH CARE EDUCATION/TRAINING PROGRAM

## 2023-11-21 PROCEDURE — G0439 PPPS, SUBSEQ VISIT: HCPCS | Performed by: STUDENT IN AN ORGANIZED HEALTH CARE EDUCATION/TRAINING PROGRAM

## 2023-11-21 PROCEDURE — 1123F ACP DISCUSS/DSCN MKR DOCD: CPT | Performed by: STUDENT IN AN ORGANIZED HEALTH CARE EDUCATION/TRAINING PROGRAM

## 2023-11-21 RX ORDER — ALPRAZOLAM 0.25 MG/1
0.25 TABLET ORAL 2 TIMES DAILY
Qty: 60 TABLET | Refills: 0 | Status: SHIPPED | OUTPATIENT
Start: 2023-11-21 | End: 2023-12-21

## 2023-11-21 SDOH — ECONOMIC STABILITY: FOOD INSECURITY: WITHIN THE PAST 12 MONTHS, YOU WORRIED THAT YOUR FOOD WOULD RUN OUT BEFORE YOU GOT MONEY TO BUY MORE.: NEVER TRUE

## 2023-11-21 SDOH — ECONOMIC STABILITY: INCOME INSECURITY: HOW HARD IS IT FOR YOU TO PAY FOR THE VERY BASICS LIKE FOOD, HOUSING, MEDICAL CARE, AND HEATING?: NOT HARD AT ALL

## 2023-11-21 SDOH — ECONOMIC STABILITY: FOOD INSECURITY: WITHIN THE PAST 12 MONTHS, THE FOOD YOU BOUGHT JUST DIDN'T LAST AND YOU DIDN'T HAVE MONEY TO GET MORE.: NEVER TRUE

## 2023-11-21 ASSESSMENT — PATIENT HEALTH QUESTIONNAIRE - PHQ9
SUM OF ALL RESPONSES TO PHQ QUESTIONS 1-9: 1
SUM OF ALL RESPONSES TO PHQ9 QUESTIONS 1 & 2: 1
2. FEELING DOWN, DEPRESSED OR HOPELESS: 0
1. LITTLE INTEREST OR PLEASURE IN DOING THINGS: 1
SUM OF ALL RESPONSES TO PHQ QUESTIONS 1-9: 1

## 2023-11-21 ASSESSMENT — LIFESTYLE VARIABLES
HOW OFTEN DO YOU HAVE A DRINK CONTAINING ALCOHOL: MONTHLY OR LESS
HOW MANY STANDARD DRINKS CONTAINING ALCOHOL DO YOU HAVE ON A TYPICAL DAY: 1 OR 2

## 2023-11-21 NOTE — PROGRESS NOTES
Medicare Annual Wellness Visit    Ankit Knox is here for Medicare AWV    Assessment & Plan   Medicare annual wellness visit, subsequent  Anxiety  -     ALPRAZolam (XANAX) 0.25 MG tablet; Take 1 tablet by mouth 2 times daily for 30 days. Max Daily Amount: 0.5 mg, Disp-60 tablet, R-0Normal  Iron deficiency anemia, unspecified iron deficiency anemia type  -     CBC with Auto Differential; Future  -     Iron and TIBC; Future  -     Comprehensive Metabolic Panel; Future  -     Path Review, Smear; Future    Recommendations for Preventive Services Due: see orders and patient instructions/AVS.  Recommended screening schedule for the next 5-10 years is provided to the patient in written form: see Patient Instructions/AVS.     Return in 3 months (on 2/21/2024) for Medication Check. Subjective   The following acute and/or chronic problems were also addressed today:  Fatigue: has some iron def anemia. Has been supplementing. Not improving much. Denies any blood in the urine or stool  Anxiety-well controlled with SSRI zoloft 150 mg and bid prn low dose xanax. Has been on it for a while, tried to stop int he past at my request and was off for some time. She has a hard time leaving the house and doing other daily activities when not on it. Outside of fatigue (Which may be from anemia) she is not having any adverse effects    Patient's complete Health Risk Assessment and screening values have been reviewed and are found in Flowsheets. The following problems were reviewed today and where indicated follow up appointments were made and/or referrals ordered.     Positive Risk Factor Screenings with Interventions:                 Weight and Activity:  Physical Activity: Inactive (11/21/2023)    Exercise Vital Sign     Days of Exercise per Week: 0 days     Minutes of Exercise per Session: 0 min     On average, how many days per week do you engage in moderate to strenuous exercise (like a brisk walk)?: 0 days  Have you lost any

## 2023-11-22 LAB
BASOPHILS ABSOLUTE: 0 K/UL (ref 0–0.2)
BASOPHILS RELATIVE PERCENT: 0 % (ref 0–2)
EOSINOPHILS ABSOLUTE: 0 K/UL (ref 0.05–0.5)
EOSINOPHILS RELATIVE PERCENT: 0 % (ref 0–6)
HCT VFR BLD CALC: 33.5 % (ref 34–48)
HEMOGLOBIN: 9.3 G/DL (ref 11.5–15.5)
IRON SATURATION: ABNORMAL % (ref 15–50)
IRON: 364 UG/DL (ref 37–145)
LYMPHOCYTES ABSOLUTE: 0.64 K/UL (ref 1.5–4)
LYMPHOCYTES RELATIVE PERCENT: 12 % (ref 20–42)
MCH RBC QN AUTO: 28.2 PG (ref 26–35)
MCHC RBC AUTO-ENTMCNC: 27.8 G/DL (ref 32–34.5)
MCV RBC AUTO: 101.5 FL (ref 80–99.9)
MONOCYTES ABSOLUTE: 0.32 K/UL (ref 0.1–0.95)
MONOCYTES RELATIVE PERCENT: 6 % (ref 2–12)
NEUTROPHILS ABSOLUTE: 4.23 K/UL (ref 1.8–7.3)
NEUTROPHILS RELATIVE PERCENT: 81 % (ref 43–80)
PATHOLOGIST REVIEW: NORMAL
PDW BLD-RTO: 14.4 % (ref 11.5–15)
PLATELET # BLD: 232 K/UL (ref 130–450)
PMV BLD AUTO: 9.2 FL (ref 7–12)
RBC # BLD: 3.3 M/UL (ref 3.5–5.5)
RBC # BLD: ABNORMAL 10*6/UL
TOTAL IRON BINDING CAPACITY: ABNORMAL UG/DL (ref 250–450)
WBC # BLD: 5.2 K/UL (ref 4.5–11.5)

## 2023-11-27 DIAGNOSIS — D64.9 ANEMIA, UNSPECIFIED TYPE: Primary | ICD-10-CM

## 2023-12-07 DIAGNOSIS — D64.9 ANEMIA, UNSPECIFIED TYPE: ICD-10-CM

## 2023-12-07 LAB
CONTROL: NORMAL
FECAL BLOOD IMMUNOCHEMICAL TEST: NORMAL

## 2023-12-27 ENCOUNTER — TELEPHONE (OUTPATIENT)
Dept: FAMILY MEDICINE CLINIC | Age: 83
End: 2023-12-27

## 2023-12-27 ENCOUNTER — CARE COORDINATION (OUTPATIENT)
Dept: CARE COORDINATION | Age: 83
End: 2023-12-27

## 2023-12-27 DIAGNOSIS — F41.9 ANXIETY: ICD-10-CM

## 2023-12-27 DIAGNOSIS — I10 PRIMARY HYPERTENSION: ICD-10-CM

## 2023-12-27 RX ORDER — SERTRALINE HYDROCHLORIDE 100 MG/1
100 TABLET, FILM COATED ORAL NIGHTLY
Qty: 90 TABLET | Refills: 1 | Status: SHIPPED | OUTPATIENT
Start: 2023-12-27

## 2023-12-27 RX ORDER — LOSARTAN POTASSIUM 50 MG/1
50 TABLET ORAL DAILY
Qty: 90 TABLET | Refills: 1 | Status: SHIPPED | OUTPATIENT
Start: 2023-12-27

## 2023-12-27 RX ORDER — ALPRAZOLAM 0.25 MG/1
0.25 TABLET ORAL 2 TIMES DAILY
Qty: 60 TABLET | Refills: 1 | Status: SHIPPED | OUTPATIENT
Start: 2023-12-27 | End: 2024-02-25

## 2023-12-27 NOTE — CARE COORDINATION
George (spouse) contacted [facility].  He states Priya is needing refills and they are having trouble reaching the office.  She needs losartan (90 supply), alprazolam (60 day supply and sertraline (90 day supply).  [facility] will send message to [facility].

## 2024-02-20 DIAGNOSIS — F41.9 ANXIETY: ICD-10-CM

## 2024-02-21 ENCOUNTER — OFFICE VISIT (OUTPATIENT)
Dept: FAMILY MEDICINE CLINIC | Age: 84
End: 2024-02-21
Payer: MEDICARE

## 2024-02-21 ENCOUNTER — OFFICE VISIT (OUTPATIENT)
Dept: FAMILY MEDICINE CLINIC | Age: 84
End: 2024-02-21

## 2024-02-21 VITALS
HEART RATE: 76 BPM | SYSTOLIC BLOOD PRESSURE: 124 MMHG | TEMPERATURE: 97.9 F | DIASTOLIC BLOOD PRESSURE: 68 MMHG | OXYGEN SATURATION: 92 % | WEIGHT: 170 LBS | BODY MASS INDEX: 30.6 KG/M2

## 2024-02-21 VITALS
DIASTOLIC BLOOD PRESSURE: 68 MMHG | TEMPERATURE: 97.9 F | WEIGHT: 170 LBS | SYSTOLIC BLOOD PRESSURE: 124 MMHG | BODY MASS INDEX: 30.6 KG/M2 | OXYGEN SATURATION: 92 % | HEART RATE: 76 BPM

## 2024-02-21 DIAGNOSIS — D64.9 ANEMIA, UNSPECIFIED TYPE: ICD-10-CM

## 2024-02-21 DIAGNOSIS — Z89.512 HISTORY OF BELOW-KNEE AMPUTATION OF LEFT LOWER EXTREMITY (HCC): ICD-10-CM

## 2024-02-21 DIAGNOSIS — I73.9 PERIPHERAL ARTERIAL DISEASE (HCC): ICD-10-CM

## 2024-02-21 DIAGNOSIS — Z00.00 MEDICARE ANNUAL WELLNESS VISIT, SUBSEQUENT: Primary | ICD-10-CM

## 2024-02-21 DIAGNOSIS — J44.9 CHRONIC OBSTRUCTIVE PULMONARY DISEASE, UNSPECIFIED COPD TYPE (HCC): ICD-10-CM

## 2024-02-21 DIAGNOSIS — I10 PRIMARY HYPERTENSION: ICD-10-CM

## 2024-02-21 DIAGNOSIS — D64.9 ANEMIA, UNSPECIFIED TYPE: Primary | ICD-10-CM

## 2024-02-21 DIAGNOSIS — F41.9 ANXIETY: ICD-10-CM

## 2024-02-21 LAB
AMORPHOUS: PRESENT
BACTERIA: ABNORMAL
BASOPHILS ABSOLUTE: 0 K/UL (ref 0–0.2)
BASOPHILS RELATIVE PERCENT: 0 % (ref 0–2)
BILIRUBIN URINE: NEGATIVE
COLOR: YELLOW
EOSINOPHILS ABSOLUTE: 0 K/UL (ref 0.05–0.5)
EOSINOPHILS RELATIVE PERCENT: 0 % (ref 0–6)
FOLATE: 7.7 NG/ML (ref 4.8–24.2)
GLUCOSE URINE: NEGATIVE MG/DL
HCT VFR BLD CALC: 35.9 % (ref 34–48)
HEMOGLOBIN: 10.2 G/DL (ref 11.5–15.5)
KETONES, URINE: NEGATIVE MG/DL
LEUKOCYTE ESTERASE, URINE: ABNORMAL
LYMPHOCYTES ABSOLUTE: 1 K/UL (ref 1.5–4)
LYMPHOCYTES RELATIVE PERCENT: 17 % (ref 20–42)
MCH RBC QN AUTO: 28.1 PG (ref 26–35)
MCHC RBC AUTO-ENTMCNC: 28.4 G/DL (ref 32–34.5)
MCV RBC AUTO: 98.9 FL (ref 80–99.9)
MONOCYTES ABSOLUTE: 0.59 K/UL (ref 0.1–0.95)
MONOCYTES RELATIVE PERCENT: 10 % (ref 2–12)
NEUTROPHILS ABSOLUTE: 4.31 K/UL (ref 1.8–7.3)
NEUTROPHILS RELATIVE PERCENT: 73 % (ref 43–80)
NITRITE, URINE: POSITIVE
PDW BLD-RTO: 14.6 % (ref 11.5–15)
PH UA: 8.5 (ref 5–9)
PLATELET # BLD: 215 K/UL (ref 130–450)
PMV BLD AUTO: 9.2 FL (ref 7–12)
PROTEIN UA: NEGATIVE MG/DL
RBC # BLD: 3.63 M/UL (ref 3.5–5.5)
RBC # BLD: ABNORMAL 10*6/UL
RBC UA: ABNORMAL /HPF
SPECIFIC GRAVITY UA: 1.01 (ref 1–1.03)
TURBIDITY: ABNORMAL
URINE HGB: ABNORMAL
UROBILINOGEN, URINE: 0.2 EU/DL (ref 0–1)
VITAMIN B-12: >2000 PG/ML (ref 211–946)
WBC # BLD: 5.9 K/UL (ref 4.5–11.5)
WBC UA: ABNORMAL /HPF

## 2024-02-21 PROCEDURE — G0439 PPPS, SUBSEQ VISIT: HCPCS | Performed by: STUDENT IN AN ORGANIZED HEALTH CARE EDUCATION/TRAINING PROGRAM

## 2024-02-21 PROCEDURE — 3078F DIAST BP <80 MM HG: CPT | Performed by: STUDENT IN AN ORGANIZED HEALTH CARE EDUCATION/TRAINING PROGRAM

## 2024-02-21 PROCEDURE — 1123F ACP DISCUSS/DSCN MKR DOCD: CPT | Performed by: STUDENT IN AN ORGANIZED HEALTH CARE EDUCATION/TRAINING PROGRAM

## 2024-02-21 PROCEDURE — 3074F SYST BP LT 130 MM HG: CPT | Performed by: STUDENT IN AN ORGANIZED HEALTH CARE EDUCATION/TRAINING PROGRAM

## 2024-02-21 RX ORDER — AMLODIPINE BESYLATE 5 MG/1
5 TABLET ORAL DAILY
Qty: 90 TABLET | Refills: 3 | Status: SHIPPED | OUTPATIENT
Start: 2024-02-21

## 2024-02-21 RX ORDER — ACETAZOLAMIDE 250 MG/1
250 TABLET ORAL DAILY
Qty: 90 TABLET | Refills: 3 | Status: CANCELLED | OUTPATIENT
Start: 2024-02-21

## 2024-02-21 RX ORDER — ALPRAZOLAM 0.25 MG/1
0.25 TABLET ORAL 2 TIMES DAILY
Qty: 60 TABLET | Refills: 0 | Status: SHIPPED | OUTPATIENT
Start: 2024-02-24 | End: 2024-03-25

## 2024-02-21 RX ORDER — ALPRAZOLAM 0.25 MG/1
TABLET ORAL
Qty: 60 TABLET | Refills: 0 | OUTPATIENT
Start: 2024-02-21

## 2024-02-21 ASSESSMENT — LIFESTYLE VARIABLES
HOW MANY STANDARD DRINKS CONTAINING ALCOHOL DO YOU HAVE ON A TYPICAL DAY: 1 OR 2
HOW OFTEN DO YOU HAVE A DRINK CONTAINING ALCOHOL: MONTHLY OR LESS

## 2024-02-21 ASSESSMENT — PATIENT HEALTH QUESTIONNAIRE - PHQ9
SUM OF ALL RESPONSES TO PHQ QUESTIONS 1-9: 1
2. FEELING DOWN, DEPRESSED OR HOPELESS: 0
SUM OF ALL RESPONSES TO PHQ9 QUESTIONS 1 & 2: 1
SUM OF ALL RESPONSES TO PHQ QUESTIONS 1-9: 1
1. LITTLE INTEREST OR PLEASURE IN DOING THINGS: 1

## 2024-02-21 NOTE — PROGRESS NOTES
[facility]  Office Progress Note  Dr. David Coffey      Patient:  Priya Garcia 83 y.o. female     Date of Service: 2/21/24      Chief complaint:   Chief Complaint   Patient presents with    Anxiety    Hypertension    Fatigue         History of Present Illness   The patient is a 83 y.o. female  here to follow up of their anxiety, fatigue, HTN    Anxiety-  Currently on xanax 0.25 mg BID, this dose has been adjusted many times. Leaving the house causes the most anxiety  Zoloft 150 mg daily  Also has a prosthetic R leg, COPD on continuous O2, chronic anemia  -she is complaining of significant fatigue. Has been there on and off in the past,       HTN  -on losartan 50 mg + norvasc 5 mg  BP Readings from Last 3 Encounters:   02/21/24 124/68   02/21/24 124/68   11/21/23 124/68   Denies chest pain, shortness of breath, leg swelling, headache, vision changes and orthopnea         Past Medical History:      Diagnosis Date    Anxiety     COPD (chronic obstructive pulmonary disease) (HCC)     Hx of blood clots        Review of Systems:   Review of Systems    Physical Exam   Vitals: /68   Pulse 76   Temp 97.9 °F (36.6 °C)   Wt 77.1 kg (170 lb)   SpO2 92% Comment: 5 liters  BMI 30.60 kg/m²   Physical Exam    General:  Well developed, well nourished, well groomed.  No apparent distress.  HEENT:  Normocephalic, atraumatic.  No scleral icterus.  No conjunctival injection. No nasal discharge.  Heart:  RRR, no murmurs, gallops, or rubs  Lungs:  CTA bilaterally, bilat symmetrical expansion, no wheeze, rales, or rhonchi  Abdomen:  Bowel sounds present, soft, nontender, no masses, no organomegaly, no peritoneal signs  Extremities:  No clubbing, cyanosis, or edema  Neuro:  Alert and oriented x3, no focal deficits      Assessment and Plan   Suspect fatigue is multifactorial-COPD, med side effect, deconditioning/age  Decrease xanax use to night time only + day time for leaving the house. DO not take day time

## 2024-02-21 NOTE — PROGRESS NOTES
Medicare Annual Wellness Visit    Priya Garcia is here for Medicare AWV    Assessment & Plan   Medicare annual wellness visit, subsequent    Recommendations for Preventive Services Due: see orders and patient instructions/AVS.  Recommended screening schedule for the next 5-10 years is provided to the patient in written form: see Patient Instructions/AVS.     Return for Medicare Annual Wellness Visit in 1 year.     Subjective       Patient's complete Health Risk Assessment and screening values have been reviewed and are found in Flowsheets. The following problems were reviewed today and where indicated follow up appointments were made and/or referrals ordered.    Positive Risk Factor Screenings with Interventions:                Activity, Diet, and Weight:  On average, how many days per week do you engage in moderate to strenuous exercise (like a brisk walk)?: 0 days  On average, how many minutes do you engage in exercise at this level?: 0 min    Do you eat balanced/healthy meals regularly?: Yes    Body mass index is 30.6 kg/m². (!) Abnormal      Inactivity Interventions:  Will further investigate reasons for fatigue  Obesity Interventions:  Patient declines any further evaluation or treatment            Dentist Screen:  Have you seen the dentist within the past year?: (!) No    Intervention:  Advised to schedule with their dentist     Vision Screen:  Do you have difficulty driving, watching TV, or doing any of your daily activities because of your eyesight?: No  Have you had an eye exam within the past year?: (!) No  No results found.    Interventions:   Patient encouraged to make appointment with their eye specialist     ADL's:   Patient reports needing help with:  Select all that apply: (!) Bathing, Walking/Balance  Select all that apply: (!) Food Preparation, Transportation, Shopping, Housekeeping, Laundry  Interventions:  Patient comments: Patient's  lives with her and helps with these things

## 2024-02-23 ENCOUNTER — TELEPHONE (OUTPATIENT)
Dept: PULMONOLOGY | Age: 84
End: 2024-02-23

## 2024-02-26 ENCOUNTER — OFFICE VISIT (OUTPATIENT)
Dept: PULMONOLOGY | Age: 84
End: 2024-02-26
Payer: MEDICARE

## 2024-02-26 VITALS
SYSTOLIC BLOOD PRESSURE: 162 MMHG | OXYGEN SATURATION: 94 % | HEART RATE: 83 BPM | HEIGHT: 64 IN | DIASTOLIC BLOOD PRESSURE: 71 MMHG | RESPIRATION RATE: 18 BRPM | BODY MASS INDEX: 28.51 KG/M2 | TEMPERATURE: 97.5 F | WEIGHT: 167 LBS

## 2024-02-26 DIAGNOSIS — J44.9 CHRONIC OBSTRUCTIVE PULMONARY DISEASE, UNSPECIFIED COPD TYPE (HCC): Primary | ICD-10-CM

## 2024-02-26 DIAGNOSIS — J96.11 CHRONIC RESPIRATORY FAILURE WITH HYPOXIA (HCC): ICD-10-CM

## 2024-02-26 LAB
EXPIRATORY TIME: 6.98 SEC
FEF 25-75% %PRED-PRE: 18 L/SEC
FEF 25-75% PRED: 1.52 L/SEC
FEF 25-75-PRE: 0.28 L/SEC
FEV1 %PRED-PRE: 33 %
FEV1 PRED: 1.9 L
FEV1/FVC %PRED-PRE: 53 %
FEV1/FVC PRED: 77 %
FEV1/FVC: 41 %
FEV1: 0.63 L
FVC %PRED-PRE: 61 %
FVC PRED: 2.51 L
FVC: 1.54 L
PEF %PRED-PRE: 41 L/SEC
PEF PRED: 4.68 L/SEC
PEF-PRE: 1.93 L/SEC

## 2024-02-26 PROCEDURE — 94010 BREATHING CAPACITY TEST: CPT | Performed by: INTERNAL MEDICINE

## 2024-02-26 PROCEDURE — 3078F DIAST BP <80 MM HG: CPT | Performed by: INTERNAL MEDICINE

## 2024-02-26 PROCEDURE — 3077F SYST BP >= 140 MM HG: CPT | Performed by: INTERNAL MEDICINE

## 2024-02-26 PROCEDURE — 99213 OFFICE O/P EST LOW 20 MIN: CPT | Performed by: INTERNAL MEDICINE

## 2024-02-26 PROCEDURE — 1123F ACP DISCUSS/DSCN MKR DOCD: CPT | Performed by: INTERNAL MEDICINE

## 2024-02-26 RX ORDER — ALBUTEROL SULFATE 90 UG/1
2 AEROSOL, METERED RESPIRATORY (INHALATION) 4 TIMES DAILY PRN
Qty: 54 G | Refills: 1 | Status: SHIPPED | OUTPATIENT
Start: 2024-02-26

## 2024-02-26 RX ORDER — IPRATROPIUM BROMIDE AND ALBUTEROL SULFATE 2.5; .5 MG/3ML; MG/3ML
1 SOLUTION RESPIRATORY (INHALATION) 3 TIMES DAILY
Qty: 360 ML | Refills: 3 | Status: SHIPPED | OUTPATIENT
Start: 2024-02-26

## 2024-02-26 ASSESSMENT — PULMONARY FUNCTION TESTS
FVC: 1.54
FEV1_PERCENT_PREDICTED_PRE: 33
FVC_PREDICTED: 2.51
FVC_PERCENT_PREDICTED_PRE: 61
FEV1_PREDICTED: 1.90
FEV1/FVC_PERCENT_PREDICTED_PRE: 53
FEV1/FVC: 41
FEV1: 0.63
FEV1/FVC_PREDICTED: 77

## 2024-02-26 NOTE — PROGRESS NOTES
[facility]     HISTORY OF PRESENT ILLNESS:    Priya Garcia is a 83 y.o. year old female here for evaluation of COPD, pulmonary lung nodule.  The patient has an extensive pulmonary history.  She was seen initially by Dr. Moore and then DrTravis Would see in the [facility] area.  She states that she has been oxygen for the last 12 years.  She did go to pulmonary rehab in the past.  She also states that she was evaluated for transplant in [place] but was denied due to age.  She has had multiple hospitalizations for COPD exacerbation.  Last hospitalization was 2 months ago.  She reports that she does try to stay as active as possible.  She does do the dishes at home.  She denies symptoms of shortness of breath with showering or shortness of breath with tying of shoes.  She does have symptoms of dry mouth, coughing, wheezing.  She reports that she is changing pulmonologist because they are now living in [place].  Home medications include trilogy rescue inhaler however she does not currently have 1 and DuoNebs which she uses twice a day.  Her home medical company was [employer].      Patient seen and examined.  Her  is present with her.  They report that she is continuing to use her DuoNebs twice a day.  She is using her rescue inhaler.  She is using her trilogy daily.  She does have a productive cough for white sputum along with a runny nose.  She is currently wearing 5 to 6 L of nasal cannula.  She is sore short of air with exertion but this is usually precipitated by anxiety and hyperventilation.  Per her  they will be going on a cruise this year and they would like us to order her an Inogen so that she is able to take a portable concentrator with her on the plane.    Patient seen and examined on August 10, 2023.  They are back from their cruise and reports that they had fun.  She is trying to stay in the house and away from the humid air and

## 2024-02-26 NOTE — PATIENT INSTRUCTIONS
Renetta Manzano    [facility]  [address] [place] [id number]  Office: [phone]      Your were seen in the office today for COPD      We  did not make changes to your medications today.       Testing ordered today was Pulmonary function tests (completed in office)      Vaccines recommended none.      Return to office in 6 months        Please do not hesitate to call the office with any questions.

## 2024-03-12 ENCOUNTER — TELEPHONE (OUTPATIENT)
Dept: FAMILY MEDICINE CLINIC | Age: 84
End: 2024-03-12

## 2024-03-12 ENCOUNTER — OFFICE VISIT (OUTPATIENT)
Dept: FAMILY MEDICINE CLINIC | Age: 84
End: 2024-03-12

## 2024-03-12 VITALS
TEMPERATURE: 97.4 F | OXYGEN SATURATION: 95 % | DIASTOLIC BLOOD PRESSURE: 64 MMHG | SYSTOLIC BLOOD PRESSURE: 120 MMHG | HEART RATE: 70 BPM | BODY MASS INDEX: 28.67 KG/M2 | HEIGHT: 64 IN

## 2024-03-12 DIAGNOSIS — J32.9 SINOBRONCHITIS: Primary | ICD-10-CM

## 2024-03-12 DIAGNOSIS — J40 SINOBRONCHITIS: Primary | ICD-10-CM

## 2024-03-12 DIAGNOSIS — Z99.81 DEPENDENCE ON SUPPLEMENTAL OXYGEN: ICD-10-CM

## 2024-03-12 DIAGNOSIS — J44.1 COPD WITH ACUTE EXACERBATION (HCC): ICD-10-CM

## 2024-03-12 LAB
INFLUENZA A ANTIBODY: NORMAL
INFLUENZA B ANTIBODY: NORMAL
Lab: NORMAL
PERFORMING INSTRUMENT: NORMAL
QC PASS/FAIL: NORMAL
SARS-COV-2, POC: NORMAL

## 2024-03-12 RX ORDER — BENZONATATE 200 MG/1
200 CAPSULE ORAL 3 TIMES DAILY PRN
Qty: 15 CAPSULE | Refills: 0 | Status: SHIPPED | OUTPATIENT
Start: 2024-03-12

## 2024-03-12 RX ORDER — DOXYCYCLINE HYCLATE 100 MG
100 TABLET ORAL 2 TIMES DAILY
Qty: 20 TABLET | Refills: 0 | Status: SHIPPED | OUTPATIENT
Start: 2024-03-12 | End: 2024-03-22

## 2024-03-12 RX ORDER — PREDNISONE 20 MG/1
20 TABLET ORAL 2 TIMES DAILY
Qty: 10 TABLET | Refills: 0 | Status: SHIPPED | OUTPATIENT
Start: 2024-03-12 | End: 2024-03-17

## 2024-03-12 NOTE — PROGRESS NOTES
Chief Complaint       Congestion (X3 days with runny nose, congestion, and headache/) and Cough      History of Present Illness   Source of history provided by:  patient.    Priya Garcia is a 83 y.o. old female presenting to the walk in clinic for evaluation of nasal congestion, sinus pressure, productive cough, chest congestion, and coughing fits which have been present for the past 3 to 4 days. Denies any fever, chills, CP, dyspnea, LE edema, abdominal pain, vomiting, rash, or lethargy.  Patient does have a history of COPD and is on long-term supplemental oxygen. Patient denies recent sick exposures.       ROS    Unless otherwise stated in this report or unable to obtain because of the patient's clinical or mental status as evidenced by the medical record, this patients's positive and negative responses for Review of Systems, constitutional, psych, eyes, ENT, cardiovascular, respiratory, gastrointestinal, neurological, genitourinary, musculoskeletal, integument systems and systems related to the presenting problem are either stated in the preceding or were not pertinent or were negative for the symptoms and/or complaints related to the medical problem.    Past Medical History:  has a past medical history of Anxiety, COPD (chronic obstructive pulmonary disease) (HCC), and Hx of blood clots.  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  Social History:  reports that she quit smoking about 22 years ago. Her smoking use included cigarettes. She has never used smokeless tobacco. She reports current alcohol use. She reports that she does not use drugs.  Family History: family history includes Cerebral Aneurysm in her mother; Prostate Cancer in her father.   Allergies: Patient has no known allergies.    Physical Exam         VS:  /64   Pulse 70   Temp 97.4 °F (36.3 °C)   Ht 1.626 m (5' 4\")   SpO2

## 2024-03-12 NOTE — TELEPHONE ENCOUNTER
calling stating that patient is \"coughing up\" a lot of phlegm and needs an antibiotic.  Advised [facility] due to patient will need an evaluation.   requesting an appointment with Dr. Coffey.  Informed  that Dr. Coffey's next available appointment isn't until 3/27.   agreeable to [facility].

## 2024-03-26 ENCOUNTER — OFFICE VISIT (OUTPATIENT)
Dept: FAMILY MEDICINE CLINIC | Age: 84
End: 2024-03-26

## 2024-03-26 VITALS
HEART RATE: 70 BPM | OXYGEN SATURATION: 91 % | DIASTOLIC BLOOD PRESSURE: 78 MMHG | SYSTOLIC BLOOD PRESSURE: 118 MMHG | TEMPERATURE: 97.6 F

## 2024-03-26 DIAGNOSIS — F41.9 ANXIETY: ICD-10-CM

## 2024-03-26 DIAGNOSIS — R53.83 FATIGUE, UNSPECIFIED TYPE: ICD-10-CM

## 2024-03-26 DIAGNOSIS — F40.00 AGORAPHOBIA: Primary | ICD-10-CM

## 2024-03-26 RX ORDER — ALPRAZOLAM 0.25 MG/1
0.25 TABLET ORAL 2 TIMES DAILY
Qty: 60 TABLET | Refills: 0 | Status: SHIPPED | OUTPATIENT
Start: 2024-03-26 | End: 2024-04-25

## 2024-03-26 ASSESSMENT — ENCOUNTER SYMPTOMS
ABDOMINAL PAIN: 0
NAUSEA: 0
VOMITING: 0
SHORTNESS OF BREATH: 0
COUGH: 0

## 2024-03-26 NOTE — PROGRESS NOTES
Outpatient Medications   Medication Sig Dispense Refill    ALPRAZolam (XANAX) 0.25 MG tablet Take 1 tablet by mouth 2 times daily for 30 days. Max Daily Amount: 0.5 mg 60 tablet 0    benzonatate (TESSALON) 200 MG capsule Take 1 capsule by mouth 3 times daily as needed for Cough 15 capsule 0    fluticasone-umeclidin-vilant (TRELEGY ELLIPTA) 200-62.5-25 MCG/ACT AEPB inhaler Inhale 1 puff into the lungs daily 3 each 3    ipratropium 0.5 mg-albuterol 2.5 mg (DUONEB) 0.5-2.5 (3) MG/3ML SOLN nebulizer solution Inhale 3 mLs into the lungs 3 times daily 360 mL 3    albuterol sulfate HFA (VENTOLIN HFA) 108 (90 Base) MCG/ACT inhaler Inhale 2 puffs into the lungs 4 times daily as needed for Wheezing 54 g 1    amLODIPine (NORVASC) 5 MG tablet Take 1 tablet by mouth daily 90 tablet 3    sertraline (ZOLOFT) 50 MG tablet Take 1 tablet by mouth daily 90 tablet 3    sertraline (ZOLOFT) 100 MG tablet Take 1 tablet by mouth nightly 90 tablet 1    losartan (COZAAR) 50 MG tablet Take 1 tablet by mouth daily 90 tablet 1    acetaZOLAMIDE (DIAMOX) 250 MG tablet Take 1 tablet by mouth daily 90 tablet 3    metoprolol tartrate (LOPRESSOR) 25 MG tablet Take 1 tablet by mouth daily 90 tablet 3    albuterol sulfate HFA (VENTOLIN HFA) 108 (90 Base) MCG/ACT inhaler Inhale 2 puffs into the lungs 4 times daily as needed for Wheezing 54 g 3    pantoprazole (PROTONIX) 40 MG tablet Take 1 tablet by mouth daily      ammonium lactate (LAC-HYDRIN) 12 % lotion Apply topically twice daily 400 g 2    cetirizine (ZYRTEC) 10 MG tablet Take 1 tablet by mouth daily      ibuprofen (ADVIL;MOTRIN) 200 MG tablet Take 1 tablet by mouth every 6 hours as needed for Pain      aspirin 81 MG chewable tablet Take 1 tablet by mouth daily      Omega-3 Fatty Acids (FISH OIL PO) Take by mouth daily      Cyanocobalamin (VITAMIN B-12 PO) Take by mouth daily      Cholecalciferol (VITAMIN D) 50 MCG (2000 UT) CAPS capsule Take by mouth daily      OXYGEN Inhale into the lungs 6

## 2024-04-15 ENCOUNTER — CARE COORDINATION (OUTPATIENT)
Dept: CARE COORDINATION | Age: 84
End: 2024-04-15

## 2024-04-15 DIAGNOSIS — F40.00 AGORAPHOBIA: ICD-10-CM

## 2024-04-15 DIAGNOSIS — F41.9 ANXIETY: ICD-10-CM

## 2024-04-15 RX ORDER — ALPRAZOLAM 0.25 MG/1
0.25 TABLET ORAL 2 TIMES DAILY
Qty: 60 TABLET | Refills: 0 | Status: SHIPPED
Start: 2024-04-15 | End: 2024-04-18 | Stop reason: SDUPTHER

## 2024-04-15 NOTE — TELEPHONE ENCOUNTER
Priya needs a new script for Alprazolam sent to [facility].      Last Appointment:  3/26/2024  Future Appointments   Date Time Provider Department Center   6/19/2024 12:30 PM David Coffey MD [facility]   9/9/2024 10:00 AM Renetta Manzano, DO [facility]

## 2024-04-15 NOTE — TELEPHONE ENCOUNTER
I remember them discussing with me in office. Yes will refill early with note to pharmacy in the rx

## 2024-04-15 NOTE — TELEPHONE ENCOUNTER
They are leaving for [place] to stay with family for 5-6 weeks.   Can she get if filled prior to leaving?

## 2024-04-18 DIAGNOSIS — F40.00 AGORAPHOBIA: ICD-10-CM

## 2024-04-18 DIAGNOSIS — F41.9 ANXIETY: ICD-10-CM

## 2024-04-18 RX ORDER — ALPRAZOLAM 0.25 MG/1
0.25 TABLET ORAL 2 TIMES DAILY
Qty: 60 TABLET | Refills: 0 | Status: SHIPPED | OUTPATIENT
Start: 2024-04-18 | End: 2024-05-18

## 2024-04-18 NOTE — TELEPHONE ENCOUNTER
The script for Alprazolam made it the [facility] pharmacy after they left for [place].    I called there and cancelled the script because they want it sent to a pharmacy in [place].       I have this ready to send to [facility] in [facility].       Last Appointment:  3/26/2024  Future Appointments   Date Time Provider Department Center   6/19/2024 12:30 PM David Coffey MD [facility]   9/9/2024 10:00 AM Renetta Manzano, DO [facility]

## 2024-04-18 NOTE — TELEPHONE ENCOUNTER
I spoke to George.  They left for [place] before it got sent to the pharmacy.    They will pick it up when back home.

## 2024-06-10 DIAGNOSIS — F41.9 ANXIETY: ICD-10-CM

## 2024-06-10 DIAGNOSIS — F40.00 AGORAPHOBIA: ICD-10-CM

## 2024-06-10 RX ORDER — ALPRAZOLAM 0.25 MG/1
0.25 TABLET ORAL 2 TIMES DAILY
Qty: 60 TABLET | Refills: 0 | Status: SHIPPED | OUTPATIENT
Start: 2024-06-10 | End: 2024-07-10

## 2024-06-10 NOTE — TELEPHONE ENCOUNTER
Pt is calling in needing a refill, and a letter stating that she cannot go on a cruise.   She states she is too sick and she needs to take care of her sister....    Mail Letter is fine to her address listed. Call and let her know when finished.

## 2024-06-12 DIAGNOSIS — I10 PRIMARY HYPERTENSION: ICD-10-CM

## 2024-06-12 RX ORDER — LOSARTAN POTASSIUM 50 MG/1
50 TABLET ORAL DAILY
Qty: 90 TABLET | Refills: 0 | Status: SHIPPED | OUTPATIENT
Start: 2024-06-12

## 2024-06-12 NOTE — TELEPHONE ENCOUNTER
Last Appointment:  3/26/2024  Future Appointments   Date Time Provider Department Center   6/19/2024 12:30 PM David Coffey MD [facility]   9/9/2024 10:00 AM [facility]

## 2024-06-12 NOTE — TELEPHONE ENCOUNTER
I can't give patient an excuse for needing to take care of her sister. That would need to come from her sisters doctor.    If she feels she can't go on the cruise due to her health conditions I am able to provide that letter.    However, please let her know it is fraud for me to write a letter stating she can't go on the cruise if she does feel well enough to go. I will take her at her word if she feels too ill to travel

## 2024-06-19 ENCOUNTER — OFFICE VISIT (OUTPATIENT)
Dept: FAMILY MEDICINE CLINIC | Age: 84
End: 2024-06-19

## 2024-06-19 VITALS
TEMPERATURE: 97.6 F | WEIGHT: 163 LBS | BODY MASS INDEX: 27.83 KG/M2 | OXYGEN SATURATION: 92 % | HEART RATE: 84 BPM | SYSTOLIC BLOOD PRESSURE: 114 MMHG | HEIGHT: 64 IN | DIASTOLIC BLOOD PRESSURE: 70 MMHG

## 2024-06-19 DIAGNOSIS — I10 PRIMARY HYPERTENSION: ICD-10-CM

## 2024-06-19 DIAGNOSIS — J96.11 CHRONIC RESPIRATORY FAILURE WITH HYPOXIA (HCC): ICD-10-CM

## 2024-06-19 DIAGNOSIS — J44.9 CHRONIC OBSTRUCTIVE PULMONARY DISEASE, UNSPECIFIED COPD TYPE (HCC): ICD-10-CM

## 2024-06-19 DIAGNOSIS — F40.00 AGORAPHOBIA: ICD-10-CM

## 2024-06-19 DIAGNOSIS — F41.9 ANXIETY: Primary | ICD-10-CM

## 2024-06-19 RX ORDER — ALBUTEROL SULFATE 90 UG/1
2 AEROSOL, METERED RESPIRATORY (INHALATION) 4 TIMES DAILY PRN
Qty: 54 G | Refills: 3 | Status: SHIPPED | OUTPATIENT
Start: 2024-06-19

## 2024-06-19 RX ORDER — ACETAZOLAMIDE 250 MG/1
250 TABLET ORAL DAILY
Qty: 90 TABLET | Refills: 3 | Status: SHIPPED | OUTPATIENT
Start: 2024-06-19

## 2024-06-19 RX ORDER — ALPRAZOLAM 0.25 MG/1
TABLET ORAL
Qty: 60 TABLET | Refills: 0
Start: 2024-06-19 | End: 2024-07-19

## 2024-06-19 RX ORDER — SERTRALINE HYDROCHLORIDE 100 MG/1
100 TABLET, FILM COATED ORAL NIGHTLY
Qty: 90 TABLET | Refills: 3 | Status: SHIPPED | OUTPATIENT
Start: 2024-06-19

## 2024-06-19 ASSESSMENT — ENCOUNTER SYMPTOMS
NAUSEA: 0
COUGH: 0
RHINORRHEA: 0
SHORTNESS OF BREATH: 1
VOMITING: 0
ABDOMINAL PAIN: 0

## 2024-06-19 NOTE — PROGRESS NOTES
Neurological:  Negative for dizziness and light-headedness.   Psychiatric/Behavioral:  The patient is nervous/anxious.        Physical Exam   Vitals: /70   Pulse 84   Temp 97.6 °F (36.4 °C)   Ht 1.626 m (5' 4\")   Wt 73.9 kg (163 lb)   SpO2 92% Comment: 5 liters  BMI 27.98 kg/m²   Physical Exam    General:  Well developed, well nourished, well groomed.  No apparent distress.  HEENT:  Normocephalic, atraumatic.  No scleral icterus.  No conjunctival injection. No nasal discharge.  Heart:  RRR, no murmurs, gallops, or rubs  Lungs:  Diminished breath sounds. Wearing nasal cannula  Abdomen:  Bowel sounds present, soft, nontender, no masses, no organomegaly, no peritoneal signs  Extremities:  No clubbing, cyanosis, or edema. L BKA  Neuro:  Alert and oriented x3, no focal deficits      Assessment and Plan     Advised to stop taking xanax during the day unless she has to get in the car. Refer to counseling in hopes of getting CBT to combat her anxiety/agoraphobia. Continue zoloft 150 mg daily  Will refill inhalers  BP controlled, no change  Follow up quickly-1 month  1. Anxiety    - sertraline (ZOLOFT) 100 MG tablet; Take 1 tablet by mouth nightly  Dispense: 90 tablet; Refill: 3  - sertraline (ZOLOFT) 50 MG tablet; Take 1 tablet by mouth daily  Dispense: 90 tablet; Refill: 3  - ALPRAZolam (XANAX) 0.25 MG tablet; Take 1 tablet 0.25 mg nightly and 1 tablet daily as needed for travel/leaving the house  Dispense: 60 tablet; Refill: 0  - External Referral To Psychology    2. Agoraphobia    - ALPRAZolam (XANAX) 0.25 MG tablet; Take 1 tablet 0.25 mg nightly and 1 tablet daily as needed for travel/leaving the house  Dispense: 60 tablet; Refill: 0  - External Referral To Psychology    3. Chronic obstructive pulmonary disease, unspecified COPD type (HCC)    - albuterol sulfate HFA (VENTOLIN HFA) 108 (90 Base) MCG/ACT inhaler; Inhale 2 puffs into the lungs 4 times daily as needed for Wheezing  Dispense: 54 g; Refill:

## 2024-06-26 ENCOUNTER — OFFICE VISIT (OUTPATIENT)
Dept: FAMILY MEDICINE CLINIC | Age: 84
End: 2024-06-26

## 2024-06-26 VITALS
HEART RATE: 72 BPM | OXYGEN SATURATION: 93 % | DIASTOLIC BLOOD PRESSURE: 62 MMHG | BODY MASS INDEX: 27.83 KG/M2 | HEIGHT: 64 IN | WEIGHT: 163 LBS | SYSTOLIC BLOOD PRESSURE: 138 MMHG | RESPIRATION RATE: 17 BRPM | TEMPERATURE: 98 F

## 2024-06-26 DIAGNOSIS — I10 PRIMARY HYPERTENSION: ICD-10-CM

## 2024-06-26 DIAGNOSIS — R19.8 CHANGE IN BOWEL MOVEMENT: ICD-10-CM

## 2024-06-26 DIAGNOSIS — J44.1 CHRONIC OBSTRUCTIVE PULMONARY DISEASE WITH ACUTE EXACERBATION (HCC): Primary | ICD-10-CM

## 2024-06-26 DIAGNOSIS — F41.9 ANXIETY: ICD-10-CM

## 2024-06-26 RX ORDER — IPRATROPIUM BROMIDE AND ALBUTEROL SULFATE 2.5; .5 MG/3ML; MG/3ML
1 SOLUTION RESPIRATORY (INHALATION) ONCE
Status: COMPLETED | OUTPATIENT
Start: 2024-06-26 | End: 2024-06-26

## 2024-06-26 RX ADMIN — IPRATROPIUM BROMIDE AND ALBUTEROL SULFATE 1 DOSE: 2.5; .5 SOLUTION RESPIRATORY (INHALATION) at 13:59

## 2024-06-26 NOTE — PROGRESS NOTES
OFFICE NOTE    24  Name: Priya Garcia  :1940   Sex:female   Age:83 y.o.      SUBJECTIVE  Chief Complaint   Patient presents with    Lower Back Pain    GI Problem     Trouble with BM        HPI came in complaining of change in BM (straining, pencil like and ball like feces) and lower abdominal and low back pain.     Review of Systems   Disabling anxiety and mild cognitive deficits suspecte. Uses small oxygen concentrator and wheelchair      Current Outpatient Medications:     acetaZOLAMIDE (DIAMOX) 250 MG tablet, Take 1 tablet by mouth daily, Disp: 90 tablet, Rfl: 3    albuterol sulfate HFA (VENTOLIN HFA) 108 (90 Base) MCG/ACT inhaler, Inhale 2 puffs into the lungs 4 times daily as needed for Wheezing, Disp: 54 g, Rfl: 3    metoprolol tartrate (LOPRESSOR) 25 MG tablet, Take 1 tablet by mouth daily, Disp: 90 tablet, Rfl: 3    sertraline (ZOLOFT) 100 MG tablet, Take 1 tablet by mouth nightly, Disp: 90 tablet, Rfl: 3    sertraline (ZOLOFT) 50 MG tablet, Take 1 tablet by mouth daily, Disp: 90 tablet, Rfl: 3    ALPRAZolam (XANAX) 0.25 MG tablet, Take 1 tablet 0.25 mg nightly and 1 tablet daily as needed for travel/leaving the house, Disp: 60 tablet, Rfl: 0    losartan (COZAAR) 50 MG tablet, TAKE ONE TABLET BY MOUTH ONCE DAILY, Disp: 90 tablet, Rfl: 0    fluticasone-umeclidin-vilant (TRELEGY ELLIPTA) 200-62.5-25 MCG/ACT AEPB inhaler, Inhale 1 puff into the lungs daily, Disp: 3 each, Rfl: 3    ipratropium 0.5 mg-albuterol 2.5 mg (DUONEB) 0.5-2.5 (3) MG/3ML SOLN nebulizer solution, Inhale 3 mLs into the lungs 3 times daily, Disp: 360 mL, Rfl: 3    albuterol sulfate HFA (VENTOLIN HFA) 108 (90 Base) MCG/ACT inhaler, Inhale 2 puffs into the lungs 4 times daily as needed for Wheezing, Disp: 54 g, Rfl: 1    amLODIPine (NORVASC) 5 MG tablet, Take 1 tablet by mouth daily, Disp: 90 tablet, Rfl: 3    pantoprazole (PROTONIX) 40 MG tablet, Take 1 tablet by mouth daily, Disp: , Rfl:     ammonium lactate (LAC-HYDRIN) 12 %

## 2024-07-02 ENCOUNTER — TELEPHONE (OUTPATIENT)
Dept: FAMILY MEDICINE CLINIC | Age: 84
End: 2024-07-02

## 2024-07-02 LAB — NONINV COLON CA DNA+OCC BLD SCRN STL QL: NORMAL

## 2024-07-02 NOTE — TELEPHONE ENCOUNTER
I need more clarification as to why they requested the form.     From phone call encounter from 6/10. I can't see anywhere in the chart where this question was answered  I can't give patient an excuse for needing to take care of her sister. That would need to come from her sisters doctor.     If she feels she can't go on the cruise due to her health conditions I am able to provide that letter.     However, please let her know it is fraud for me to write a letter stating she can't go on the cruise if she does feel well enough to go. I will take her at her word if she feels too ill to travel

## 2024-07-02 NOTE — TELEPHONE ENCOUNTER
Spoke to George.   He said her breathing issues make it hard for her to get around.   he also said that \" Bertha said it's just too much for her to get around\"

## 2024-07-02 NOTE — TELEPHONE ENCOUNTER
George dropped off a form yesterday that is needed in order for the cruise line to reimburse his $2,300. George in anxious to  the form and would like a call once it is ready. States that the matter is time sensitive.

## 2024-07-02 NOTE — TELEPHONE ENCOUNTER
Forms completed   George called and advised that forms are done and at the    He will  forms tomorrow

## 2024-07-08 ENCOUNTER — APPOINTMENT (OUTPATIENT)
Dept: CT IMAGING | Age: 84
End: 2024-07-08
Payer: MEDICARE

## 2024-07-08 ENCOUNTER — HOSPITAL ENCOUNTER (EMERGENCY)
Age: 84
Discharge: HOME | End: 2024-07-08
Attending: STUDENT IN AN ORGANIZED HEALTH CARE EDUCATION/TRAINING PROGRAM
Payer: MEDICARE

## 2024-07-08 VITALS
TEMPERATURE: 97 F | SYSTOLIC BLOOD PRESSURE: 144 MMHG | RESPIRATION RATE: 16 BRPM | HEART RATE: 65 BPM | OXYGEN SATURATION: 98 % | DIASTOLIC BLOOD PRESSURE: 67 MMHG

## 2024-07-08 DIAGNOSIS — R10.84 GENERALIZED ABDOMINAL PAIN: Primary | ICD-10-CM

## 2024-07-08 DIAGNOSIS — R19.7 DIARRHEA, UNSPECIFIED TYPE: ICD-10-CM

## 2024-07-08 LAB
ALBUMIN SERPL-MCNC: 4.1 G/DL (ref 3.5–5.2)
ALP SERPL-CCNC: 75 U/L (ref 35–104)
ALT SERPL-CCNC: 9 U/L (ref 0–32)
ANION GAP SERPL CALCULATED.3IONS-SCNC: 4 MMOL/L (ref 7–16)
AST SERPL-CCNC: 15 U/L (ref 0–31)
BACTERIA URNS QL MICRO: ABNORMAL
BASOPHILS # BLD: 0.05 K/UL (ref 0–0.2)
BASOPHILS NFR BLD: 1 % (ref 0–2)
BILIRUB SERPL-MCNC: 0.2 MG/DL (ref 0–1.2)
BILIRUB UR QL STRIP: NEGATIVE
BUN SERPL-MCNC: 14 MG/DL (ref 6–23)
CALCIUM SERPL-MCNC: 9.3 MG/DL (ref 8.6–10.2)
CHLORIDE SERPL-SCNC: 102 MMOL/L (ref 98–107)
CLARITY UR: CLEAR
CO2 SERPL-SCNC: 33 MMOL/L (ref 22–29)
COLOR UR: YELLOW
CREAT SERPL-MCNC: 0.9 MG/DL (ref 0.5–1)
EOSINOPHIL # BLD: 0.05 K/UL (ref 0.05–0.5)
EOSINOPHILS RELATIVE PERCENT: 1 % (ref 0–6)
ERYTHROCYTE [DISTWIDTH] IN BLOOD BY AUTOMATED COUNT: 15.2 % (ref 11.5–15)
GFR, ESTIMATED: 67 ML/MIN/1.73M2
GLUCOSE SERPL-MCNC: 105 MG/DL (ref 74–99)
GLUCOSE UR STRIP-MCNC: NEGATIVE MG/DL
HCT VFR BLD AUTO: 35.2 % (ref 34–48)
HGB BLD-MCNC: 9.9 G/DL (ref 11.5–15.5)
HGB UR QL STRIP.AUTO: ABNORMAL
KETONES UR STRIP-MCNC: NEGATIVE MG/DL
LACTATE BLDV-SCNC: 0.6 MMOL/L (ref 0.5–2.2)
LEUKOCYTE ESTERASE UR QL STRIP: ABNORMAL
LIPASE SERPL-CCNC: 13 U/L (ref 13–60)
LYMPHOCYTES NFR BLD: 0.72 K/UL (ref 1.5–4)
LYMPHOCYTES RELATIVE PERCENT: 13 % (ref 20–42)
MCH RBC QN AUTO: 27.7 PG (ref 26–35)
MCHC RBC AUTO-ENTMCNC: 28.1 G/DL (ref 32–34.5)
MCV RBC AUTO: 98.3 FL (ref 80–99.9)
MONOCYTES NFR BLD: 0.19 K/UL (ref 0.1–0.95)
MONOCYTES NFR BLD: 4 % (ref 2–12)
NEUTROPHILS NFR BLD: 82 % (ref 43–80)
NEUTS SEG NFR BLD: 4.5 K/UL (ref 1.8–7.3)
NITRITE UR QL STRIP: NEGATIVE
PH UR STRIP: 6.5 [PH] (ref 5–9)
PLATELET # BLD AUTO: 201 K/UL (ref 130–450)
PMV BLD AUTO: 8.7 FL (ref 7–12)
POTASSIUM SERPL-SCNC: 4.2 MMOL/L (ref 3.5–5)
PROT SERPL-MCNC: 7.2 G/DL (ref 6.4–8.3)
PROT UR STRIP-MCNC: NEGATIVE MG/DL
RBC # BLD AUTO: 3.58 M/UL (ref 3.5–5.5)
RBC # BLD: ABNORMAL 10*6/UL
RBC #/AREA URNS HPF: ABNORMAL /HPF
SODIUM SERPL-SCNC: 139 MMOL/L (ref 132–146)
SP GR UR STRIP: 1.01 (ref 1–1.03)
UROBILINOGEN UR STRIP-ACNC: 0.2 EU/DL (ref 0–1)
WBC #/AREA URNS HPF: ABNORMAL /HPF
WBC OTHER # BLD: 5.5 K/UL (ref 4.5–11.5)

## 2024-07-08 PROCEDURE — 83690 ASSAY OF LIPASE: CPT

## 2024-07-08 PROCEDURE — 80053 COMPREHEN METABOLIC PANEL: CPT

## 2024-07-08 PROCEDURE — 6360000004 HC RX CONTRAST MEDICATION: Performed by: RADIOLOGY

## 2024-07-08 PROCEDURE — 74177 CT ABD & PELVIS W/CONTRAST: CPT

## 2024-07-08 PROCEDURE — 81001 URINALYSIS AUTO W/SCOPE: CPT

## 2024-07-08 PROCEDURE — 83605 ASSAY OF LACTIC ACID: CPT

## 2024-07-08 PROCEDURE — 99285 EMERGENCY DEPT VISIT HI MDM: CPT

## 2024-07-08 PROCEDURE — 85025 COMPLETE CBC W/AUTO DIFF WBC: CPT

## 2024-07-08 RX ORDER — DICYCLOMINE HYDROCHLORIDE 10 MG/1
10 CAPSULE ORAL
Qty: 120 CAPSULE | Refills: 0 | Status: SHIPPED | OUTPATIENT
Start: 2024-07-08

## 2024-07-08 RX ADMIN — IOPAMIDOL 75 ML: 755 INJECTION, SOLUTION INTRAVENOUS at 19:55

## 2024-07-08 ASSESSMENT — LIFESTYLE VARIABLES
HOW MANY STANDARD DRINKS CONTAINING ALCOHOL DO YOU HAVE ON A TYPICAL DAY: PATIENT DOES NOT DRINK
HOW OFTEN DO YOU HAVE A DRINK CONTAINING ALCOHOL: NEVER

## 2024-07-08 NOTE — ED TRIAGE NOTES
FIRST PROVIDER CONTACT ASSESSMENT NOTE       Department of Emergency Medicine                 First Provider Note            24  3:23 PM EDT    Date of Encounter: No admission date for patient encounter.    Patient Name: Priya Garcia  : 1940  MRN: [id number]    Chief Complaint: Incontinence (Pt states she's unable to control her bowel movements x2 week), Abdominal Pain (\"Sometimes\"), and Dysuria (\"Sometimes\")      History of Present Illness:   Priya Garcia is a 83 y.o. female who presents to the ED for back and abdominal pain, difficulty controlling bowels.   Onset 1 week ago     Focused Physical Exam:  VS:    ED Triage Vitals [24 1520]   BP Temp Temp Source Pulse Respirations SpO2 Height Weight   (!) 144/67 97 °F (36.1 °C) Temporal 65 16 98 % -- --        Physical Ex: Constitutional: Alert and non-toxic.    Medical History:  has a past medical history of Anxiety, COPD (chronic obstructive pulmonary disease) (HCC), and Hx of blood clots.  Surgical History:  has a past surgical history that includes joint replacement (Right, 2019) and Leg amputation below knee (Left, 2017).  Social History:  reports that she quit smoking about 22 years ago. Her smoking use included cigarettes. She has never used smokeless tobacco. She reports current alcohol use. She reports that she does not use drugs.  Family History: family history includes Cerebral Aneurysm in her mother; Prostate Cancer in her father.    Allergies: Patient has no known allergies.     Initial Plan of Care: Initiate Treatment-Testing, Proceed toTreatment Area When Bed Available for ED Attending/MLP to Continue Care      ---END OF FIRST PROVIDER CONTACT ASSESSMENT NOTE---  Electronically signed by Mady Blue PA-C   DD: 24

## 2024-07-08 NOTE — ED PROVIDER NOTES
[facility] EMERGENCY DEPARTMENT  EMERGENCY DEPARTMENT ENCOUNTER    Pt Name: Priya Garcia  MRN: [id number]  Birthdate 1940  Date of evaluation: 7/8/2024  Provider: Rneé Woo MD  PCP: David Coffey MD  Note Started: 5:30 PM EDT 7/8/24    HPI     Patient is a 83 y.o. female presents with a chief complaint of   Chief Complaint   Patient presents with    Incontinence     Pt states she's unable to control her bowel movements x2 week    Abdominal Pain     \"Sometimes\"    Dysuria     \"Sometimes\"   .    Patient presents for change in bowel movements.  Patient does have some intermittent abdominal pain.  Patient also states that she sometimes have some burning with urination.  Patient denies any fevers, chills, patient stated that she has a history of family members that required ostomy and is worried about this.  Patient denies any changes in urinary or bowel habits    Nursing Notes were all reviewed and agreed with or any disagreements were addressed in the HPI.    History From:  Patient and patient's  is at bed    Review of Systems   Pertinent positives and negatives as per HPI.     Physical Exam  Vitals and nursing note reviewed.   Constitutional:       Appearance: She is well-developed.   HENT:      Head: Normocephalic and atraumatic.   Eyes:      Conjunctiva/sclera: Conjunctivae normal.   Cardiovascular:      Rate and Rhythm: Normal rate and regular rhythm.      Heart sounds: Normal heart sounds. No murmur heard.  Pulmonary:      Effort: Pulmonary effort is normal. No respiratory distress.      Breath sounds: Normal breath sounds. No wheezing or rales.      Comments: Chronic oxygen normal with diminished lung sounds bilaterally.  No respiratory distress  Abdominal:      General: Bowel sounds are normal.      Palpations: Abdomen is soft.      Tenderness: There is abdominal tenderness. There is no guarding or rebound.      Comments: .  Minimal epigastric

## 2024-07-09 NOTE — DISCHARGE INSTRUCTIONS
CT ABDOMEN PELVIS W IV CONTRAST Additional Contrast? None   Final Result   1. No acute intra-abdominal or pelvic abnormality.   2. Left-sided colonic diverticula without diverticulitis.   3. Significant atheromatous plaque in the abdominal aorta and its branches.   There is significant plaque in the distal abdominal aorta and both common   iliac arteries with bilateral iliac artery stents.   4. Significant sclerosis and suggestion of impaction fracture in the left   femoral head. This could be related to avascular necrosis.           Follow-up with your regular orthopedic surgeon

## 2024-07-16 ENCOUNTER — TELEPHONE (OUTPATIENT)
Dept: FAMILY MEDICINE CLINIC | Age: 84
End: 2024-07-16

## 2024-07-16 DIAGNOSIS — F40.00 AGORAPHOBIA: ICD-10-CM

## 2024-07-16 DIAGNOSIS — F41.9 ANXIETY: ICD-10-CM

## 2024-07-16 RX ORDER — ALPRAZOLAM 0.25 MG/1
TABLET ORAL
Qty: 60 TABLET | Refills: 0 | OUTPATIENT
Start: 2024-07-16

## 2024-07-16 RX ORDER — ALPRAZOLAM 0.25 MG/1
TABLET ORAL
Qty: 60 TABLET | Refills: 0 | Status: SHIPPED | OUTPATIENT
Start: 2024-07-16 | End: 2024-08-15

## 2024-07-16 NOTE — TELEPHONE ENCOUNTER
Priya is asking for a new script for Alprazolam to be sent to [facility].       Last Appointment:  6/19/2024  Future Appointments   Date Time Provider Department Center   7/25/2024  1:00 PM David Coffey MD [facility]   9/9/2024 10:00 AM Renetta Manzano, DO [facility]

## 2024-07-25 ENCOUNTER — OFFICE VISIT (OUTPATIENT)
Dept: FAMILY MEDICINE CLINIC | Age: 84
End: 2024-07-25

## 2024-07-25 VITALS — OXYGEN SATURATION: 95 % | TEMPERATURE: 97.4 F | HEART RATE: 78 BPM

## 2024-07-25 DIAGNOSIS — R19.8 CHANGE IN BOWEL MOVEMENT: ICD-10-CM

## 2024-07-25 DIAGNOSIS — D50.9 IRON DEFICIENCY ANEMIA, UNSPECIFIED IRON DEFICIENCY ANEMIA TYPE: Primary | ICD-10-CM

## 2024-07-25 DIAGNOSIS — F41.9 ANXIETY: ICD-10-CM

## 2024-07-25 ASSESSMENT — ENCOUNTER SYMPTOMS
RHINORRHEA: 0
ABDOMINAL PAIN: 0
SHORTNESS OF BREATH: 1
VOMITING: 0
NAUSEA: 0
COUGH: 0

## 2024-07-25 NOTE — PROGRESS NOTES
with and wishes to proceed with above treatment plan.    Call or go to ED immediately if symptoms worsen or persist. Advised patient to call with any new medication issues, and, as applicable, read all Rx info from pharmacy to assure aware of all possible risks and side effects of medicationbefore taking.     Patient and/or guardian given opportunity to ask questions/raise concerns.  The patient verbalized comfort and understanding ofinstructions.      Return to Office: Return in about 3 months (around 10/25/2024) for anxiety.    Medication List:    Current Outpatient Medications   Medication Sig Dispense Refill    ALPRAZolam (XANAX) 0.25 MG tablet Take 1 tablet 0.25 mg nightly and 1 tablet daily as needed for travel/leaving the house 60 tablet 0    dicyclomine (BENTYL) 10 MG capsule Take 1 capsule by mouth 4 times daily (before meals and nightly) 120 capsule 0    acetaZOLAMIDE (DIAMOX) 250 MG tablet Take 1 tablet by mouth daily 90 tablet 3    albuterol sulfate HFA (VENTOLIN HFA) 108 (90 Base) MCG/ACT inhaler Inhale 2 puffs into the lungs 4 times daily as needed for Wheezing 54 g 3    metoprolol tartrate (LOPRESSOR) 25 MG tablet Take 1 tablet by mouth daily 90 tablet 3    sertraline (ZOLOFT) 100 MG tablet Take 1 tablet by mouth nightly 90 tablet 3    sertraline (ZOLOFT) 50 MG tablet Take 1 tablet by mouth daily 90 tablet 3    losartan (COZAAR) 50 MG tablet TAKE ONE TABLET BY MOUTH ONCE DAILY 90 tablet 0    fluticasone-umeclidin-vilant (TRELEGY ELLIPTA) 200-62.5-25 MCG/ACT AEPB inhaler Inhale 1 puff into the lungs daily 3 each 3    ipratropium 0.5 mg-albuterol 2.5 mg (DUONEB) 0.5-2.5 (3) MG/3ML SOLN nebulizer solution Inhale 3 mLs into the lungs 3 times daily 360 mL 3    albuterol sulfate HFA (VENTOLIN HFA) 108 (90 Base) MCG/ACT inhaler Inhale 2 puffs into the lungs 4 times daily as needed for Wheezing 54 g 1    amLODIPine (NORVASC) 5 MG tablet Take 1 tablet by mouth daily 90 tablet 3    pantoprazole (PROTONIX) 40 MG

## 2024-08-15 ENCOUNTER — CARE COORDINATION (OUTPATIENT)
Dept: CARE COORDINATION | Age: 84
End: 2024-08-15

## 2024-08-15 DIAGNOSIS — F41.9 ANXIETY: ICD-10-CM

## 2024-08-15 DIAGNOSIS — F40.00 AGORAPHOBIA: ICD-10-CM

## 2024-08-15 RX ORDER — ALPRAZOLAM 0.25 MG/1
TABLET ORAL
Qty: 60 TABLET | Refills: 0 | Status: SHIPPED | OUTPATIENT
Start: 2024-08-15 | End: 2024-09-14

## 2024-08-15 NOTE — TELEPHONE ENCOUNTER
Last Appointment:  7/25/2024  Future Appointments   Date Time Provider Department Center   9/9/2024 10:20 AM Renetta Manzano DO [facility] Pulm [facility]   10/29/2024 11:00 AM David Coffey MD [facility]

## 2024-08-15 NOTE — CARE COORDINATION
TIGIST received phone call from patient's  (George).  He states he has been unable to contact the office for a medication refill.  Priya needs a refill of alprazolam.  [facility] will send staff message.

## 2024-08-15 NOTE — TELEPHONE ENCOUNTER
Refill sent, PDMP reviewed    Please remind Priya and her  to mostly use at night and only as needed during the day. Should not be using every day during the day

## 2024-09-09 ENCOUNTER — OFFICE VISIT (OUTPATIENT)
Dept: PULMONOLOGY | Age: 84
End: 2024-09-09
Payer: MEDICARE

## 2024-09-09 DIAGNOSIS — J44.9 CHRONIC OBSTRUCTIVE PULMONARY DISEASE, UNSPECIFIED COPD TYPE (HCC): Primary | ICD-10-CM

## 2024-09-09 DIAGNOSIS — J96.11 CHRONIC HYPOXEMIC RESPIRATORY FAILURE: ICD-10-CM

## 2024-09-09 PROCEDURE — 99213 OFFICE O/P EST LOW 20 MIN: CPT | Performed by: INTERNAL MEDICINE

## 2024-09-09 PROCEDURE — 1123F ACP DISCUSS/DSCN MKR DOCD: CPT | Performed by: INTERNAL MEDICINE

## 2024-09-09 RX ORDER — ALBUTEROL SULFATE 90 UG/1
2 INHALANT RESPIRATORY (INHALATION) 4 TIMES DAILY PRN
Qty: 3 EACH | Refills: 1 | Status: SHIPPED | OUTPATIENT
Start: 2024-09-09

## 2024-09-09 RX ORDER — FLUTICASONE FUROATE, UMECLIDINIUM BROMIDE AND VILANTEROL TRIFENATATE 200; 62.5; 25 UG/1; UG/1; UG/1
1 POWDER RESPIRATORY (INHALATION) DAILY
Qty: 3 EACH | Refills: 3 | Status: SHIPPED | OUTPATIENT
Start: 2024-09-09

## 2024-09-09 NOTE — PROGRESS NOTES
Patient to follow up with physician in 6 months.  Patient given scripts for [employer] patient  prescription program.  No other changes in treatment.

## 2024-09-09 NOTE — PROGRESS NOTES
[facility] PHYSICIANS [facility]     HISTORY OF PRESENT ILLNESS:    Priya Garcia is a 83 y.o. year old female here for evaluation of COPD, pulmonary lung nodule.  The patient has an extensive pulmonary history.  She was seen initially by Dr. Moore and then DrTravis Would see in the [facility] area.  She states that she has been oxygen for the last 12 years.  She did go to pulmonary rehab in the past.  She also states that she was evaluated for transplant in [place] but was denied due to age.  She has had multiple hospitalizations for COPD exacerbation.  Last hospitalization was 2 months ago.  She reports that she does try to stay as active as possible.  She does do the dishes at home.  She denies symptoms of shortness of breath with showering or shortness of breath with tying of shoes.  She does have symptoms of dry mouth, coughing, wheezing.  She reports that she is changing pulmonologist because they are now living in [place].  Home medications include trilogy rescue inhaler however she does not currently have 1 and DuoNebs which she uses twice a day.  Her home medical company was [employer].      Patient seen and examined.  Her  is present with her.  They report that she is continuing to use her DuoNebs twice a day.  She is using her rescue inhaler.  She is using her trilogy daily.  She does have a productive cough for white sputum along with a runny nose.  She is currently wearing 5 to 6 L of nasal cannula.  She is sore short of air with exertion but this is usually precipitated by anxiety and hyperventilation.  Per her  they will be going on a cruise this year and they would like us to order her an [employer]ogen so that she is able to take a portable concentrator with her on the plane.    Patient seen and examined on August 10, 2023.  They are back from their cruise and reports that they had fun.  She is trying to stay in the house and away from the humid air and

## 2024-09-09 NOTE — PATIENT INSTRUCTIONS
Renetta Manzano    [facility]  [address] [place] [id number]  Office: [phone]      Your were seen in the office today for COPD      We  did not make changes to your medications today.       Testing ordered today was none      Vaccines recommended Influenza      Ok to use oxygen at home at 4-5 liters to keep pulse ox above 90%      Follow up in 6 months        Please do not hesitate to call the office with any questions.

## 2024-09-18 LAB — NONINV COLON CA DNA+OCC BLD SCRN STL QL: NEGATIVE

## 2024-09-23 DIAGNOSIS — I10 PRIMARY HYPERTENSION: ICD-10-CM

## 2024-09-23 RX ORDER — LOSARTAN POTASSIUM 50 MG/1
50 TABLET ORAL DAILY
Qty: 90 TABLET | Refills: 3 | Status: SHIPPED | OUTPATIENT
Start: 2024-09-23

## 2024-09-26 DIAGNOSIS — F40.00 AGORAPHOBIA: ICD-10-CM

## 2024-09-26 DIAGNOSIS — F41.9 ANXIETY: ICD-10-CM

## 2024-09-26 RX ORDER — ALPRAZOLAM 0.25 MG
TABLET ORAL
Qty: 60 TABLET | Refills: 0 | Status: SHIPPED | OUTPATIENT
Start: 2024-09-26 | End: 2024-10-26

## 2024-10-08 ENCOUNTER — TELEPHONE (OUTPATIENT)
Dept: FAMILY MEDICINE CLINIC | Age: 84
End: 2024-10-08

## 2024-10-08 NOTE — TELEPHONE ENCOUNTER
Daughter In Law has called, and says her mom is depressed, has not left the house in months. Not sure if they are taking their meds correctly. She is worried about him driving. She thinks mom is to much for dad to take care of by himself. She feels they need [facility], I gave her 2 local facility names and she is going to call them. YASMINE is wanting to make sure a medication review is completed on next visit. YASMINE is home in [place] for a few days.

## 2024-10-29 ENCOUNTER — OFFICE VISIT (OUTPATIENT)
Dept: FAMILY MEDICINE CLINIC | Age: 84
End: 2024-10-29

## 2024-10-29 VITALS
OXYGEN SATURATION: 92 % | DIASTOLIC BLOOD PRESSURE: 64 MMHG | HEART RATE: 112 BPM | TEMPERATURE: 97.1 F | SYSTOLIC BLOOD PRESSURE: 124 MMHG

## 2024-10-29 DIAGNOSIS — I10 PRIMARY HYPERTENSION: Primary | ICD-10-CM

## 2024-10-29 DIAGNOSIS — F40.00 AGORAPHOBIA: ICD-10-CM

## 2024-10-29 DIAGNOSIS — F41.9 ANXIETY: ICD-10-CM

## 2024-10-29 RX ORDER — ALPRAZOLAM 0.25 MG/1
TABLET ORAL
Qty: 60 TABLET | Refills: 0 | Status: SHIPPED | OUTPATIENT
Start: 2024-10-29 | End: 2024-11-28

## 2024-10-29 ASSESSMENT — PATIENT HEALTH QUESTIONNAIRE - PHQ9
2. FEELING DOWN, DEPRESSED OR HOPELESS: SEVERAL DAYS
5. POOR APPETITE OR OVEREATING: SEVERAL DAYS
7. TROUBLE CONCENTRATING ON THINGS, SUCH AS READING THE NEWSPAPER OR WATCHING TELEVISION: NOT AT ALL
SUM OF ALL RESPONSES TO PHQ QUESTIONS 1-9: 10
SUM OF ALL RESPONSES TO PHQ9 QUESTIONS 1 & 2: 3
10. IF YOU CHECKED OFF ANY PROBLEMS, HOW DIFFICULT HAVE THESE PROBLEMS MADE IT FOR YOU TO DO YOUR WORK, TAKE CARE OF THINGS AT HOME, OR GET ALONG WITH OTHER PEOPLE: SOMEWHAT DIFFICULT
6. FEELING BAD ABOUT YOURSELF - OR THAT YOU ARE A FAILURE OR HAVE LET YOURSELF OR YOUR FAMILY DOWN: NOT AT ALL
8. MOVING OR SPEAKING SO SLOWLY THAT OTHER PEOPLE COULD HAVE NOTICED. OR THE OPPOSITE, BEING SO FIGETY OR RESTLESS THAT YOU HAVE BEEN MOVING AROUND A LOT MORE THAN USUAL: NOT AT ALL
SUM OF ALL RESPONSES TO PHQ QUESTIONS 1-9: 10
SUM OF ALL RESPONSES TO PHQ QUESTIONS 1-9: 10
3. TROUBLE FALLING OR STAYING ASLEEP: NEARLY EVERY DAY
SUM OF ALL RESPONSES TO PHQ QUESTIONS 1-9: 10
1. LITTLE INTEREST OR PLEASURE IN DOING THINGS: MORE THAN HALF THE DAYS
9. THOUGHTS THAT YOU WOULD BE BETTER OFF DEAD, OR OF HURTING YOURSELF: NOT AT ALL
4. FEELING TIRED OR HAVING LITTLE ENERGY: NEARLY EVERY DAY

## 2024-10-29 ASSESSMENT — ENCOUNTER SYMPTOMS
SHORTNESS OF BREATH: 1
ABDOMINAL PAIN: 0
RHINORRHEA: 0
NAUSEA: 0
VOMITING: 0
COUGH: 0

## 2024-10-29 NOTE — PROGRESS NOTES
prn. Stressed that she should do everythign she can to avoid using it during the day time and to start limiting daytime naps.  New psychology referral placed.  Conitnue high dose zoloft  BP controlled on amlodipine 10 mg daily, losartan 50 mg daily  PDMP reviewed and consistent  Priya was seen today for anxiety, fatigue and depression.    Diagnoses and all orders for this visit:    Primary hypertension    Anxiety  -     ALPRAZolam (XANAX) 0.25 MG tablet; TAKE 1 TAB BY MOUTH NIGHTLY. MAY TAKE AN ADDITIONAL TAB AS NEEDED FOR LEAVING THE HOUSE  -     External Referral To Psychology    Agoraphobia  -     ALPRAZolam (XANAX) 0.25 MG tablet; TAKE 1 TAB BY MOUTH NIGHTLY. MAY TAKE AN ADDITIONAL TAB AS NEEDED FOR LEAVING THE HOUSE  -     External Referral To Psychology      I am the primary care provider for this patient and provide continuity of care. The patient was instructed to follow up with me      Counseled regarding above diagnosis, including possible risks and complications,  especially if left uncontrolled. Counseled regarding the possible side effects, risks, benefits and alternatives to treatment;patient and/or guardian verbalizes understanding, agrees, feels comfortable with and wishes to proceed with above treatment plan.    Call or go to ED immediately if symptoms worsen or persist. Advised patient to call with any new medication issues, and, as applicable, read all Rx info from pharmacy to assure aware of all possible risks and side effects of medicationbefore taking.     Patient and/or guardian given opportunity to ask questions/raise concerns.  The patient verbalized comfort and understanding ofinstructions.      Return to Office: Return in about 3 months (around 1/29/2025).    Medication List:    Current Outpatient Medications   Medication Sig Dispense Refill    ALPRAZolam (XANAX) 0.25 MG tablet TAKE 1 TAB BY MOUTH NIGHTLY. MAY TAKE AN ADDITIONAL TAB AS NEEDED FOR LEAVING THE HOUSE 60 tablet 0    losartan

## 2024-11-14 ENCOUNTER — TELEPHONE (OUTPATIENT)
Dept: FAMILY MEDICINE CLINIC | Age: 84
End: 2024-11-14

## 2024-11-14 DIAGNOSIS — J44.9 CHRONIC OBSTRUCTIVE PULMONARY DISEASE, UNSPECIFIED COPD TYPE (HCC): Primary | ICD-10-CM

## 2024-11-14 RX ORDER — IPRATROPIUM BROMIDE AND ALBUTEROL SULFATE 2.5; .5 MG/3ML; MG/3ML
1 SOLUTION RESPIRATORY (INHALATION) 3 TIMES DAILY
Qty: 360 ML | Refills: 12 | Status: SHIPPED | OUTPATIENT
Start: 2024-11-14

## 2024-11-14 NOTE — TELEPHONE ENCOUNTER
George calling about Priya's left hip pain, asking if something can be prescribed for her. He states that she can hardly get around.    I let him know that her doctor will be in later today.  He did not want to wait, so [facility]

## 2024-11-27 ENCOUNTER — OFFICE VISIT (OUTPATIENT)
Dept: FAMILY MEDICINE CLINIC | Age: 84
End: 2024-11-27

## 2024-11-27 ENCOUNTER — TELEPHONE (OUTPATIENT)
Dept: FAMILY MEDICINE CLINIC | Age: 84
End: 2024-11-27

## 2024-11-27 VITALS
HEIGHT: 64 IN | DIASTOLIC BLOOD PRESSURE: 82 MMHG | RESPIRATION RATE: 18 BRPM | BODY MASS INDEX: 25.78 KG/M2 | WEIGHT: 151 LBS | TEMPERATURE: 97.1 F | SYSTOLIC BLOOD PRESSURE: 122 MMHG | HEART RATE: 85 BPM | OXYGEN SATURATION: 94 %

## 2024-11-27 DIAGNOSIS — M47.816 LOCALIZED OSTEOARTHRITIS OF LUMBAR SPINE: ICD-10-CM

## 2024-11-27 DIAGNOSIS — J44.9 CHRONIC OBSTRUCTIVE PULMONARY DISEASE, UNSPECIFIED COPD TYPE (HCC): ICD-10-CM

## 2024-11-27 DIAGNOSIS — K59.03 DRUG-INDUCED CONSTIPATION: Primary | ICD-10-CM

## 2024-11-27 DIAGNOSIS — J96.11 CHRONIC RESPIRATORY FAILURE WITH HYPOXIA: ICD-10-CM

## 2024-11-27 RX ORDER — SENNOSIDES A AND B 8.6 MG/1
1 TABLET, FILM COATED ORAL 2 TIMES DAILY
Qty: 60 TABLET | Refills: 11 | Status: SHIPPED | OUTPATIENT
Start: 2024-11-27 | End: 2025-11-27

## 2024-11-27 RX ORDER — METHYLPREDNISOLONE ACETATE 40 MG/ML
40 INJECTION, SUSPENSION INTRA-ARTICULAR; INTRALESIONAL; INTRAMUSCULAR; SOFT TISSUE ONCE
Status: COMPLETED | OUTPATIENT
Start: 2024-11-27 | End: 2024-11-27

## 2024-11-27 RX ADMIN — METHYLPREDNISOLONE ACETATE 40 MG: 40 INJECTION, SUSPENSION INTRA-ARTICULAR; INTRALESIONAL; INTRAMUSCULAR; SOFT TISSUE at 15:05

## 2024-11-27 NOTE — TELEPHONE ENCOUNTER
Patient's  called, states that you wrote a prescription down on a paper and wanted Priya to stop taking it. They went home and checked all of Priya's meds, they do not have a prescription of Bentyl they never picked up the Rx that was sent 7/8/24.     He would like Bentyl removed from Priya's medication list. If she needs anything else called in her would like scripts sent to [facility] in [place].

## 2024-11-27 NOTE — PROGRESS NOTES
OBJECTIVE  Vitals:    11/27/24 1419 11/27/24 1424   BP: 122/82    Pulse: 85    Resp: 18    Temp: 97.1 °F (36.2 °C)    TempSrc: Temporal    SpO2: (!) 80% 94%   Weight: 68.5 kg (151 lb)    Height: 1.626 m (5' 4\")         Body mass index is 25.92 kg/m².    No orders of the defined types were placed in this encounter.       EXAM   Physical Exam  Vitals and nursing note reviewed.   Constitutional:       Appearance: She is normal weight. She is ill-appearing.   HENT:      Right Ear: Tympanic membrane and external ear normal.      Left Ear: Tympanic membrane and external ear normal.      Nose: Congestion present.      Mouth/Throat:      Pharynx: Oropharynx is clear. No posterior oropharyngeal erythema.   Eyes:      Conjunctiva/sclera: Conjunctivae normal.      Pupils: Pupils are equal, round, and reactive to light.   Cardiovascular:      Rate and Rhythm: Normal rate and regular rhythm.      Heart sounds: No murmur heard.  Pulmonary:      Effort: Pulmonary effort is normal.      Breath sounds: No wheezing, rhonchi or rales.      Comments: Severe obstruction on auscultation  Abdominal:      General: Bowel sounds are normal.      Palpations: There is no mass.      Tenderness: There is no abdominal tenderness. There is no guarding.      Hernia: No hernia is present.   Musculoskeletal:      Cervical back: No tenderness.      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 ROM of hip fair. Suspect pain is coming from back.   Lymphadenopathy:      Cervical: No cervical adenopathy.   Skin:     Coloration: Skin is pale. Skin is not jaundiced.      Findings: No bruising or rash.   Neurological:      General: No focal deficit present.      Mental Status: She is alert. She is disoriented.   Psychiatric:         Mood and Affect: Mood normal.         Behavior: Behavior normal.           Priya was seen today for hip pain and congestion.    Diagnoses and all orders for this visit:    Drug-induced constipation  -     senna

## 2024-12-10 ENCOUNTER — TELEPHONE (OUTPATIENT)
Dept: FAMILY MEDICINE CLINIC | Age: 84
End: 2024-12-10

## 2024-12-10 NOTE — TELEPHONE ENCOUNTER
I spoke to George and explained that once they get to their destination, he should call the office for the script and give us the name of the pharmacy to send the script to.  He is agreeable to this.

## 2024-12-10 NOTE — TELEPHONE ENCOUNTER
I don't believe they will be able to use a paper rx for controlled substances. I know in [place] pharmacies no longer accept paper scripts for alprazolam and I assume it is the same in [place].  They will need to call a few days/week before she is due for a refill and we will send it electronically to the pharmacy of their choice.

## 2024-12-10 NOTE — TELEPHONE ENCOUNTER
George calling in asking for a pre-dated Rx for patient's alprazolam.  George stated that they will be leaving for [place] on 12/23/24 and returning at the end of February.  George stated she will be due for refill around mid January.  I informed George that an Rx could be sent to preferred pharmacy in [place] when patient is ready for refill.  Patient asking for pre-dated Rx to be mailed to his home as soon as possible.  Please advise.

## 2024-12-11 ENCOUNTER — TELEPHONE (OUTPATIENT)
Dept: FAMILY MEDICINE CLINIC | Age: 84
End: 2024-12-11

## 2024-12-11 DIAGNOSIS — F40.00 AGORAPHOBIA: ICD-10-CM

## 2024-12-11 DIAGNOSIS — F41.9 ANXIETY: ICD-10-CM

## 2024-12-11 RX ORDER — ALPRAZOLAM 0.25 MG/1
TABLET ORAL
Qty: 60 TABLET | Refills: 0 | Status: SHIPPED | OUTPATIENT
Start: 2024-12-11 | End: 2025-01-10

## 2024-12-11 NOTE — TELEPHONE ENCOUNTER
George calling back today for a new script for Alprazolam.    She currently has 8 days worth of these, and they will be leaving the area in 10 days.     He is asking if you can send a new script for this to [facility]?

## 2024-12-31 NOTE — TELEPHONE ENCOUNTER
George calling for new script for 2 of the medications she is running out of.    I let him know that she should have these on file at her pharmacy because Dr Coffey renewed a few months ago for a year.    He will contact [facility].

## 2025-01-01 ENCOUNTER — TELEPHONE (OUTPATIENT)
Dept: FAMILY MEDICINE CLINIC | Age: 85
End: 2025-01-01

## 2025-01-01 ENCOUNTER — HOSPITAL ENCOUNTER (INPATIENT)
Age: 85
LOS: 9 days | Discharge: HOME | DRG: 871 | End: 2025-03-06
Attending: EMERGENCY MEDICINE | Admitting: FAMILY MEDICINE
Payer: MEDICARE

## 2025-01-01 ENCOUNTER — APPOINTMENT (OUTPATIENT)
Dept: GENERAL RADIOLOGY | Age: 85
DRG: 871 | End: 2025-01-01
Payer: MEDICARE

## 2025-01-01 ENCOUNTER — TELEMEDICINE (OUTPATIENT)
Dept: PULMONOLOGY | Age: 85
End: 2025-01-01
Payer: MEDICARE

## 2025-01-01 ENCOUNTER — APPOINTMENT (OUTPATIENT)
Dept: CT IMAGING | Age: 85
DRG: 871 | End: 2025-01-01
Payer: MEDICARE

## 2025-01-01 ENCOUNTER — RESULTS FOLLOW-UP (OUTPATIENT)
Dept: INPATIENT UNIT | Age: 85
End: 2025-01-01

## 2025-01-01 VITALS
OXYGEN SATURATION: 97 % | RESPIRATION RATE: 20 BRPM | HEIGHT: 65 IN | SYSTOLIC BLOOD PRESSURE: 114 MMHG | TEMPERATURE: 97.3 F | DIASTOLIC BLOOD PRESSURE: 62 MMHG | HEART RATE: 72 BPM | WEIGHT: 151 LBS | BODY MASS INDEX: 25.16 KG/M2

## 2025-01-01 DIAGNOSIS — D64.9 ANEMIA, UNSPECIFIED TYPE: ICD-10-CM

## 2025-01-01 DIAGNOSIS — N18.9 ACUTE KIDNEY INJURY SUPERIMPOSED ON CKD: ICD-10-CM

## 2025-01-01 DIAGNOSIS — N17.9 ACUTE KIDNEY INJURY SUPERIMPOSED ON CKD: ICD-10-CM

## 2025-01-01 DIAGNOSIS — F41.9 ANXIETY: ICD-10-CM

## 2025-01-01 DIAGNOSIS — J96.11 CHRONIC HYPOXEMIC RESPIRATORY FAILURE (HCC): ICD-10-CM

## 2025-01-01 DIAGNOSIS — J44.9 CHRONIC OBSTRUCTIVE PULMONARY DISEASE, UNSPECIFIED COPD TYPE (HCC): Primary | ICD-10-CM

## 2025-01-01 DIAGNOSIS — J18.9 PNEUMONIA DUE TO INFECTIOUS ORGANISM, UNSPECIFIED LATERALITY, UNSPECIFIED PART OF LUNG: ICD-10-CM

## 2025-01-01 DIAGNOSIS — F40.00 AGORAPHOBIA: ICD-10-CM

## 2025-01-01 DIAGNOSIS — J44.1 COPD EXACERBATION (HCC): ICD-10-CM

## 2025-01-01 DIAGNOSIS — J96.21 ACUTE ON CHRONIC RESPIRATORY FAILURE WITH HYPOXIA AND HYPERCAPNIA (HCC): Primary | ICD-10-CM

## 2025-01-01 DIAGNOSIS — J96.11 CHRONIC RESPIRATORY FAILURE WITH HYPOXIA (HCC): ICD-10-CM

## 2025-01-01 DIAGNOSIS — K59.03 DRUG-INDUCED CONSTIPATION: ICD-10-CM

## 2025-01-01 DIAGNOSIS — J96.22 ACUTE ON CHRONIC RESPIRATORY FAILURE WITH HYPOXIA AND HYPERCAPNIA (HCC): Primary | ICD-10-CM

## 2025-01-01 DIAGNOSIS — J44.9 CHRONIC OBSTRUCTIVE PULMONARY DISEASE, UNSPECIFIED COPD TYPE (HCC): ICD-10-CM

## 2025-01-01 DIAGNOSIS — I10 PRIMARY HYPERTENSION: ICD-10-CM

## 2025-01-01 LAB
ABO + RH BLD: NORMAL
ABO/RH: NORMAL
ACB COMPLEX DNA BLD POS QL NAA+NON-PROBE: NOT DETECTED
ALBUMIN SERPL-MCNC: 3.1 G/DL (ref 3.5–5.2)
ALBUMIN SERPL-MCNC: 3.1 G/DL (ref 3.5–5.2)
ALBUMIN SERPL-MCNC: 3.2 G/DL (ref 3.5–5.2)
ALBUMIN SERPL-MCNC: 3.3 G/DL (ref 3.5–5.2)
ALBUMIN SERPL-MCNC: 3.5 G/DL (ref 3.5–5.2)
ALP SERPL-CCNC: 55 U/L (ref 35–104)
ALP SERPL-CCNC: 57 U/L (ref 35–104)
ALP SERPL-CCNC: 58 U/L (ref 35–104)
ALP SERPL-CCNC: 58 U/L (ref 35–104)
ALP SERPL-CCNC: 62 U/L (ref 35–104)
ALP SERPL-CCNC: 65 U/L (ref 35–104)
ALP SERPL-CCNC: 66 U/L (ref 35–104)
ALP SERPL-CCNC: 71 U/L (ref 35–104)
ALP SERPL-CCNC: 86 U/L (ref 35–104)
ALT SERPL-CCNC: 10 U/L (ref 0–32)
ALT SERPL-CCNC: 11 U/L (ref 0–32)
ALT SERPL-CCNC: 12 U/L (ref 0–32)
ALT SERPL-CCNC: 12 U/L (ref 0–32)
ALT SERPL-CCNC: 14 U/L (ref 0–32)
ALT SERPL-CCNC: 14 U/L (ref 0–32)
ALT SERPL-CCNC: 16 U/L (ref 0–32)
ANION GAP SERPL CALCULATED.3IONS-SCNC: 10 MMOL/L (ref 7–16)
ANION GAP SERPL CALCULATED.3IONS-SCNC: 6 MMOL/L (ref 7–16)
ANION GAP SERPL CALCULATED.3IONS-SCNC: 8 MMOL/L (ref 7–16)
ANION GAP SERPL CALCULATED.3IONS-SCNC: 8 MMOL/L (ref 7–16)
ANION GAP SERPL CALCULATED.3IONS-SCNC: 9 MMOL/L (ref 7–16)
ANTIBODY SCREEN: NEGATIVE
ARM BAND NUMBER: NORMAL
ARM BAND NUMBER: NORMAL
AST SERPL-CCNC: 10 U/L (ref 0–31)
AST SERPL-CCNC: 13 U/L (ref 0–31)
AST SERPL-CCNC: 15 U/L (ref 0–31)
AST SERPL-CCNC: 7 U/L (ref 0–31)
AST SERPL-CCNC: 8 U/L (ref 0–31)
AST SERPL-CCNC: 9 U/L (ref 0–31)
AST SERPL-CCNC: 9 U/L (ref 0–31)
B FRAGILIS DNA BLD POS QL NAA+NON-PROBE: NOT DETECTED
B PARAP IS1001 DNA NPH QL NAA+NON-PROBE: NOT DETECTED
B PERT DNA SPEC QL NAA+PROBE: NOT DETECTED
B.E.: 1.6 MMOL/L (ref -3–3)
B.E.: 4.8 MMOL/L (ref -3–3)
B.E.: 5.9 MMOL/L (ref -3–3)
B.E.: 7.7 MMOL/L (ref -3–3)
BASOPHILS # BLD: 0 K/UL (ref 0–0.2)
BASOPHILS # BLD: 0.01 K/UL (ref 0–0.2)
BASOPHILS # BLD: 0.01 K/UL (ref 0–0.2)
BASOPHILS # BLD: 0.02 K/UL (ref 0–0.2)
BASOPHILS NFR BLD: 0 % (ref 0–2)
BILIRUB SERPL-MCNC: 0.2 MG/DL (ref 0–1.2)
BIOFIRE TEST COMMENT: ABNORMAL
BLOOD BANK BLOOD PRODUCT EXPIRATION DATE: NORMAL
BLOOD BANK DISPENSE STATUS: NORMAL
BLOOD BANK ISBT PRODUCT BLOOD TYPE: 5100
BLOOD BANK PRODUCT CODE: NORMAL
BLOOD BANK SAMPLE EXPIRATION: NORMAL
BLOOD BANK SAMPLE EXPIRATION: NORMAL
BLOOD BANK UNIT TYPE AND RH: NORMAL
BLOOD GROUP ANTIBODIES SERPL: NEGATIVE
BNP SERPL-MCNC: 5465 PG/ML (ref 0–450)
BNP SERPL-MCNC: 6443 PG/ML (ref 0–450)
BPU ID: NORMAL
BUN SERPL-MCNC: 35 MG/DL (ref 6–23)
BUN SERPL-MCNC: 43 MG/DL (ref 6–23)
BUN SERPL-MCNC: 43 MG/DL (ref 6–23)
BUN SERPL-MCNC: 46 MG/DL (ref 6–23)
BUN SERPL-MCNC: 46 MG/DL (ref 6–23)
BUN SERPL-MCNC: 48 MG/DL (ref 6–23)
BUN SERPL-MCNC: 49 MG/DL (ref 6–23)
BUN SERPL-MCNC: 49 MG/DL (ref 6–23)
BUN SERPL-MCNC: 50 MG/DL (ref 6–23)
C ALBICANS DNA BLD POS QL NAA+NON-PROBE: NOT DETECTED
C AURIS DNA BLD POS QL NAA+NON-PROBE: NOT DETECTED
C GATTII+NEOFOR DNA BLD POS QL NAA+N-PRB: NOT DETECTED
C GLABRATA DNA BLD POS QL NAA+NON-PROBE: NOT DETECTED
C KRUSEI DNA BLD POS QL NAA+NON-PROBE: NOT DETECTED
C PARAP DNA BLD POS QL NAA+NON-PROBE: NOT DETECTED
C PNEUM DNA NPH QL NAA+NON-PROBE: NOT DETECTED
C TROPICLS DNA BLD POS QL NAA+NON-PROBE: NOT DETECTED
CALCIUM SERPL-MCNC: 9 MG/DL (ref 8.6–10.2)
CALCIUM SERPL-MCNC: 9.1 MG/DL (ref 8.6–10.2)
CALCIUM SERPL-MCNC: 9.1 MG/DL (ref 8.6–10.2)
CALCIUM SERPL-MCNC: 9.2 MG/DL (ref 8.6–10.2)
CALCIUM SERPL-MCNC: 9.2 MG/DL (ref 8.6–10.2)
CALCIUM SERPL-MCNC: 9.3 MG/DL (ref 8.6–10.2)
CALCIUM SERPL-MCNC: 9.3 MG/DL (ref 8.6–10.2)
CALCIUM SERPL-MCNC: 9.4 MG/DL (ref 8.6–10.2)
CALCIUM SERPL-MCNC: 9.5 MG/DL (ref 8.6–10.2)
CHLORIDE SERPL-SCNC: 102 MMOL/L (ref 98–107)
CHLORIDE SERPL-SCNC: 103 MMOL/L (ref 98–107)
CHLORIDE SERPL-SCNC: 104 MMOL/L (ref 98–107)
CHLORIDE SERPL-SCNC: 105 MMOL/L (ref 98–107)
CHLORIDE SERPL-SCNC: 106 MMOL/L (ref 98–107)
CHLORIDE UR-SCNC: 24 MMOL/L
CO2 SERPL-SCNC: 28 MMOL/L (ref 22–29)
CO2 SERPL-SCNC: 31 MMOL/L (ref 22–29)
CO2 SERPL-SCNC: 32 MMOL/L (ref 22–29)
CO2 SERPL-SCNC: 33 MMOL/L (ref 22–29)
CO2 SERPL-SCNC: 33 MMOL/L (ref 22–29)
CO2 SERPL-SCNC: 35 MMOL/L (ref 22–29)
COHB: 0.5 % (ref 0–1.5)
COHB: 0.5 % (ref 0–1.5)
COHB: 0.7 % (ref 0–1.5)
COHB: 0.7 % (ref 0–1.5)
COMPONENT: NORMAL
CREAT SERPL-MCNC: 1 MG/DL (ref 0.5–1)
CREAT SERPL-MCNC: 1.1 MG/DL (ref 0.5–1)
CREAT SERPL-MCNC: 1.4 MG/DL (ref 0.5–1)
CREAT UR-MCNC: 92.4 MG/DL (ref 29–226)
CRITICAL: ABNORMAL
CROSSMATCH RESULT: NORMAL
CRP SERPL HS-MCNC: 94 MG/L (ref 0–5)
DATE ANALYZED: ABNORMAL
DATE OF COLLECTION: ABNORMAL
E CLOAC COMP DNA BLD POS NAA+NON-PROBE: NOT DETECTED
E COLI DNA BLD POS QL NAA+NON-PROBE: NOT DETECTED
E FAECALIS DNA BLD POS QL NAA+NON-PROBE: NOT DETECTED
E FAECIUM DNA BLD POS QL NAA+NON-PROBE: NOT DETECTED
EKG ATRIAL RATE: 131 BPM
EKG ATRIAL RATE: 208 BPM
EKG ATRIAL RATE: 416 BPM
EKG ATRIAL RATE: 86 BPM
EKG Q-T INTERVAL: 306 MS
EKG Q-T INTERVAL: 316 MS
EKG Q-T INTERVAL: 348 MS
EKG Q-T INTERVAL: 352 MS
EKG QRS DURATION: 80 MS
EKG QRS DURATION: 80 MS
EKG QRS DURATION: 86 MS
EKG QRS DURATION: 92 MS
EKG QTC CALCULATION (BAZETT): 396 MS
EKG QTC CALCULATION (BAZETT): 407 MS
EKG QTC CALCULATION (BAZETT): 442 MS
EKG QTC CALCULATION (BAZETT): 468 MS
EKG R AXIS: 31 DEGREES
EKG R AXIS: 41 DEGREES
EKG R AXIS: 58 DEGREES
EKG R AXIS: 60 DEGREES
EKG T AXIS: -163 DEGREES
EKG T AXIS: 144 DEGREES
EKG T AXIS: 156 DEGREES
EKG T AXIS: 173 DEGREES
EKG VENTRICULAR RATE: 100 BPM
EKG VENTRICULAR RATE: 101 BPM
EKG VENTRICULAR RATE: 109 BPM
EKG VENTRICULAR RATE: 95 BPM
ENTEROBACTERALES DNA BLD POS NAA+N-PRB: NOT DETECTED
EOSINOPHIL # BLD: 0 K/UL (ref 0.05–0.5)
EOSINOPHILS RELATIVE PERCENT: 0 % (ref 0–6)
ERYTHROCYTE [DISTWIDTH] IN BLOOD BY AUTOMATED COUNT: 19.7 % (ref 11.5–15)
ERYTHROCYTE [DISTWIDTH] IN BLOOD BY AUTOMATED COUNT: 19.9 % (ref 11.5–15)
ERYTHROCYTE [DISTWIDTH] IN BLOOD BY AUTOMATED COUNT: 20.2 % (ref 11.5–15)
ERYTHROCYTE [DISTWIDTH] IN BLOOD BY AUTOMATED COUNT: 20.6 % (ref 11.5–15)
ERYTHROCYTE [DISTWIDTH] IN BLOOD BY AUTOMATED COUNT: 21.2 % (ref 11.5–15)
ERYTHROCYTE [DISTWIDTH] IN BLOOD BY AUTOMATED COUNT: 21.4 % (ref 11.5–15)
ERYTHROCYTE [DISTWIDTH] IN BLOOD BY AUTOMATED COUNT: 21.8 % (ref 11.5–15)
ERYTHROCYTE [DISTWIDTH] IN BLOOD BY AUTOMATED COUNT: 22.5 % (ref 11.5–15)
ERYTHROCYTE [DISTWIDTH] IN BLOOD BY AUTOMATED COUNT: 22.7 % (ref 11.5–15)
ERYTHROCYTE [SEDIMENTATION RATE] IN BLOOD BY WESTERGREN METHOD: 34 MM/HR (ref 0–20)
FLUAV RNA NPH QL NAA+NON-PROBE: NOT DETECTED
FLUBV RNA NPH QL NAA+NON-PROBE: NOT DETECTED
GFR, ESTIMATED: 38 ML/MIN/1.73M2
GFR, ESTIMATED: 49 ML/MIN/1.73M2
GFR, ESTIMATED: 50 ML/MIN/1.73M2
GFR, ESTIMATED: 50 ML/MIN/1.73M2
GFR, ESTIMATED: 51 ML/MIN/1.73M2
GFR, ESTIMATED: 53 ML/MIN/1.73M2
GFR, ESTIMATED: 54 ML/MIN/1.73M2
GFR, ESTIMATED: 57 ML/MIN/1.73M2
GFR, ESTIMATED: 58 ML/MIN/1.73M2
GLUCOSE SERPL-MCNC: 101 MG/DL (ref 74–99)
GLUCOSE SERPL-MCNC: 112 MG/DL (ref 74–99)
GLUCOSE SERPL-MCNC: 124 MG/DL (ref 74–99)
GLUCOSE SERPL-MCNC: 128 MG/DL (ref 74–99)
GLUCOSE SERPL-MCNC: 161 MG/DL (ref 74–99)
GLUCOSE SERPL-MCNC: 162 MG/DL (ref 74–99)
GLUCOSE SERPL-MCNC: 169 MG/DL (ref 74–99)
GLUCOSE SERPL-MCNC: 176 MG/DL (ref 74–99)
GLUCOSE SERPL-MCNC: 179 MG/DL (ref 74–99)
GP B STREP DNA BLD POS QL NAA+NON-PROBE: NOT DETECTED
HADV DNA NPH QL NAA+NON-PROBE: NOT DETECTED
HAEM INFLU DNA BLD POS QL NAA+NON-PROBE: NOT DETECTED
HCO3: 29.1 MMOL/L (ref 22–26)
HCO3: 31.4 MMOL/L (ref 22–26)
HCO3: 33.6 MMOL/L (ref 22–26)
HCO3: 38.1 MMOL/L (ref 22–26)
HCOV 229E RNA NPH QL NAA+NON-PROBE: NOT DETECTED
HCOV HKU1 RNA NPH QL NAA+NON-PROBE: NOT DETECTED
HCOV NL63 RNA NPH QL NAA+NON-PROBE: NOT DETECTED
HCOV OC43 RNA NPH QL NAA+NON-PROBE: NOT DETECTED
HCT VFR BLD AUTO: 26.6 % (ref 34–48)
HCT VFR BLD AUTO: 28.5 % (ref 34–48)
HCT VFR BLD AUTO: 28.6 % (ref 34–48)
HCT VFR BLD AUTO: 28.6 % (ref 34–48)
HCT VFR BLD AUTO: 28.8 % (ref 34–48)
HCT VFR BLD AUTO: 29.1 % (ref 34–48)
HCT VFR BLD AUTO: 29.2 % (ref 34–48)
HCT VFR BLD AUTO: 29.5 % (ref 34–48)
HCT VFR BLD AUTO: 29.7 % (ref 34–48)
HCT VFR BLD AUTO: 29.8 % (ref 34–48)
HGB BLD-MCNC: 7.1 G/DL (ref 11.5–15.5)
HGB BLD-MCNC: 7.8 G/DL (ref 11.5–15.5)
HGB BLD-MCNC: 8 G/DL (ref 11.5–15.5)
HGB BLD-MCNC: 8.2 G/DL (ref 11.5–15.5)
HGB BLD-MCNC: 8.3 G/DL (ref 11.5–15.5)
HGB BLD-MCNC: 8.3 G/DL (ref 11.5–15.5)
HGB BLD-MCNC: 8.4 G/DL (ref 11.5–15.5)
HHB: 0.2 % (ref 0–5)
HHB: 1.2 % (ref 0–5)
HHB: 1.2 % (ref 0–5)
HHB: 2 % (ref 0–5)
HMPV RNA NPH QL NAA+NON-PROBE: NOT DETECTED
HPIV1 RNA NPH QL NAA+NON-PROBE: NOT DETECTED
HPIV2 RNA NPH QL NAA+NON-PROBE: NOT DETECTED
HPIV3 RNA NPH QL NAA+NON-PROBE: NOT DETECTED
HPIV4 RNA NPH QL NAA+NON-PROBE: NOT DETECTED
IMM GRANULOCYTES # BLD AUTO: 0.13 K/UL (ref 0–0.58)
IMM GRANULOCYTES # BLD AUTO: 0.2 K/UL (ref 0–0.58)
IMM GRANULOCYTES # BLD AUTO: 0.27 K/UL (ref 0–0.58)
IMM GRANULOCYTES NFR BLD: 1 % (ref 0–5)
IMM GRANULOCYTES NFR BLD: 1 % (ref 0–5)
IMM GRANULOCYTES NFR BLD: 2 % (ref 0–5)
K OXYTOCA DNA BLD POS QL NAA+NON-PROBE: NOT DETECTED
KLEBSIELLA SP DNA BLD POS QL NAA+NON-PRB: NOT DETECTED
KLEBSIELLA SP DNA BLD POS QL NAA+NON-PRB: NOT DETECTED
L MONOCYTOG DNA BLD POS QL NAA+NON-PROBE: NOT DETECTED
LAB: ABNORMAL
LACTATE BLDV-SCNC: 0.8 MMOL/L (ref 0.5–1.9)
LYMPHOCYTES NFR BLD: 0 K/UL (ref 1.5–4)
LYMPHOCYTES NFR BLD: 0 K/UL (ref 1.5–4)
LYMPHOCYTES NFR BLD: 0.11 K/UL (ref 1.5–4)
LYMPHOCYTES NFR BLD: 0.18 K/UL (ref 1.5–4)
LYMPHOCYTES NFR BLD: 0.19 K/UL (ref 1.5–4)
LYMPHOCYTES NFR BLD: 0.31 K/UL (ref 1.5–4)
LYMPHOCYTES NFR BLD: 0.32 K/UL (ref 1.5–4)
LYMPHOCYTES NFR BLD: 0.43 K/UL (ref 1.5–4)
LYMPHOCYTES NFR BLD: 0.43 K/UL (ref 1.5–4)
LYMPHOCYTES RELATIVE PERCENT: 0 % (ref 20–42)
LYMPHOCYTES RELATIVE PERCENT: 0 % (ref 20–42)
LYMPHOCYTES RELATIVE PERCENT: 1 % (ref 20–42)
LYMPHOCYTES RELATIVE PERCENT: 2 % (ref 20–42)
LYMPHOCYTES RELATIVE PERCENT: 3 % (ref 20–42)
LYMPHOCYTES RELATIVE PERCENT: 3 % (ref 20–42)
LYMPHOCYTES RELATIVE PERCENT: 4 % (ref 20–42)
Lab: 1405
Lab: 1629
Lab: 1630
Lab: 750
M PNEUMO DNA NPH QL NAA+NON-PROBE: NOT DETECTED
MCH RBC QN AUTO: 26.4 PG (ref 26–35)
MCH RBC QN AUTO: 26.7 PG (ref 26–35)
MCH RBC QN AUTO: 26.8 PG (ref 26–35)
MCH RBC QN AUTO: 26.9 PG (ref 26–35)
MCH RBC QN AUTO: 26.9 PG (ref 26–35)
MCH RBC QN AUTO: 27.1 PG (ref 26–35)
MCHC RBC AUTO-ENTMCNC: 26.7 G/DL (ref 32–34.5)
MCHC RBC AUTO-ENTMCNC: 26.8 G/DL (ref 32–34.5)
MCHC RBC AUTO-ENTMCNC: 27.8 G/DL (ref 32–34.5)
MCHC RBC AUTO-ENTMCNC: 27.8 G/DL (ref 32–34.5)
MCHC RBC AUTO-ENTMCNC: 27.9 G/DL (ref 32–34.5)
MCHC RBC AUTO-ENTMCNC: 28 G/DL (ref 32–34.5)
MCHC RBC AUTO-ENTMCNC: 28 G/DL (ref 32–34.5)
MCHC RBC AUTO-ENTMCNC: 28.1 G/DL (ref 32–34.5)
MCHC RBC AUTO-ENTMCNC: 28.4 G/DL (ref 32–34.5)
MCV RBC AUTO: 100.3 FL (ref 80–99.9)
MCV RBC AUTO: 95.3 FL (ref 80–99.9)
MCV RBC AUTO: 95.3 FL (ref 80–99.9)
MCV RBC AUTO: 95.4 FL (ref 80–99.9)
MCV RBC AUTO: 96 FL (ref 80–99.9)
MCV RBC AUTO: 96.3 FL (ref 80–99.9)
MCV RBC AUTO: 96.4 FL (ref 80–99.9)
MCV RBC AUTO: 96.4 FL (ref 80–99.9)
MCV RBC AUTO: 98.9 FL (ref 80–99.9)
METHB: 0.3 % (ref 0–1.5)
MICROORGANISM SPEC CULT: ABNORMAL
MICROORGANISM SPEC CULT: NORMAL
MICROORGANISM SPEC CULT: NORMAL
MICROORGANISM/AGENT SPEC: ABNORMAL
MODE: ABNORMAL
MONOCYTES NFR BLD: 0.18 K/UL (ref 0.1–0.95)
MONOCYTES NFR BLD: 0.21 K/UL (ref 0.1–0.95)
MONOCYTES NFR BLD: 0.22 K/UL (ref 0.1–0.95)
MONOCYTES NFR BLD: 0.23 K/UL (ref 0.1–0.95)
MONOCYTES NFR BLD: 0.51 K/UL (ref 0.1–0.95)
MONOCYTES NFR BLD: 0.52 K/UL (ref 0.1–0.95)
MONOCYTES NFR BLD: 0.6 K/UL (ref 0.1–0.95)
MONOCYTES NFR BLD: 0.66 K/UL (ref 0.1–0.95)
MONOCYTES NFR BLD: 0.88 K/UL (ref 0.1–0.95)
MONOCYTES NFR BLD: 2 % (ref 2–12)
MONOCYTES NFR BLD: 4 % (ref 2–12)
MONOCYTES NFR BLD: 4 % (ref 2–12)
MONOCYTES NFR BLD: 5 % (ref 2–12)
MONOCYTES NFR BLD: 6 % (ref 2–12)
MONOCYTES NFR BLD: 6 % (ref 2–12)
MYELOCYTES ABSOLUTE COUNT: 0.1 K/UL
MYELOCYTES ABSOLUTE COUNT: 0.11 K/UL
MYELOCYTES ABSOLUTE COUNT: 0.21 K/UL
MYELOCYTES: 1 %
MYELOCYTES: 1 %
MYELOCYTES: 2 %
N MEN DNA BLD POS QL NAA+NON-PROBE: NOT DETECTED
NEUTROPHILS NFR BLD: 90 % (ref 43–80)
NEUTROPHILS NFR BLD: 90 % (ref 43–80)
NEUTROPHILS NFR BLD: 92 % (ref 43–80)
NEUTROPHILS NFR BLD: 93 % (ref 43–80)
NEUTROPHILS NFR BLD: 93 % (ref 43–80)
NEUTROPHILS NFR BLD: 94 % (ref 43–80)
NEUTROPHILS NFR BLD: 97 % (ref 43–80)
NEUTS SEG NFR BLD: 10.04 K/UL (ref 1.8–7.3)
NEUTS SEG NFR BLD: 10.24 K/UL (ref 1.8–7.3)
NEUTS SEG NFR BLD: 10.88 K/UL (ref 1.8–7.3)
NEUTS SEG NFR BLD: 10.97 K/UL (ref 1.8–7.3)
NEUTS SEG NFR BLD: 11.44 K/UL (ref 1.8–7.3)
NEUTS SEG NFR BLD: 11.57 K/UL (ref 1.8–7.3)
NEUTS SEG NFR BLD: 12.17 K/UL (ref 1.8–7.3)
NEUTS SEG NFR BLD: 12.63 K/UL (ref 1.8–7.3)
NEUTS SEG NFR BLD: 12.86 K/UL (ref 1.8–7.3)
NON-GYN CYTOLOGY REPORT: NORMAL
NUCLEATED RED BLOOD CELLS: 1 PER 100 WBC
O2 CONTENT: 11.3 ML/DL
O2 CONTENT: 11.3 ML/DL
O2 CONTENT: 11.7 ML/DL
O2 CONTENT: 12.4 ML/DL
O2 SATURATION: 98 % (ref 92–98.5)
O2 SATURATION: 98.8 % (ref 92–98.5)
O2 SATURATION: 98.8 % (ref 92–98.5)
O2 SATURATION: 99.8 % (ref 92–98.5)
O2HB: 97.2 % (ref 94–97)
O2HB: 97.8 % (ref 94–97)
O2HB: 98 % (ref 94–97)
O2HB: 98.8 % (ref 94–97)
OPERATOR ID: 3186
OPERATOR ID: 3186
OPERATOR ID: 46
OPERATOR ID: ABNORMAL
P AERUGINOSA DNA BLD POS NAA+NON-PROBE: NOT DETECTED
PATIENT TEMP: 37 C
PCO2: 106.2 MMHG (ref 35–45)
PCO2: 60.3 MMHG (ref 35–45)
PCO2: 64 MMHG (ref 35–45)
PCO2: 71.5 MMHG (ref 35–45)
PEEP/CPAP: 6 CMH2O
PH BLOOD GAS: 7.17 (ref 7.35–7.45)
PH BLOOD GAS: 7.28 (ref 7.35–7.45)
PH BLOOD GAS: 7.29 (ref 7.35–7.45)
PH BLOOD GAS: 7.33 (ref 7.35–7.45)
PLATELET # BLD AUTO: 155 K/UL (ref 130–450)
PLATELET # BLD AUTO: 209 K/UL (ref 130–450)
PLATELET # BLD AUTO: 215 K/UL (ref 130–450)
PLATELET # BLD AUTO: 230 K/UL (ref 130–450)
PLATELET # BLD AUTO: 235 K/UL (ref 130–450)
PLATELET # BLD AUTO: 237 K/UL (ref 130–450)
PLATELET # BLD AUTO: 238 K/UL (ref 130–450)
PLATELET # BLD AUTO: 282 K/UL (ref 130–450)
PLATELET # BLD AUTO: 328 K/UL (ref 130–450)
PMV BLD AUTO: 8.3 FL (ref 7–12)
PMV BLD AUTO: 8.7 FL (ref 7–12)
PMV BLD AUTO: 8.8 FL (ref 7–12)
PMV BLD AUTO: 8.8 FL (ref 7–12)
PMV BLD AUTO: 8.9 FL (ref 7–12)
PMV BLD AUTO: 8.9 FL (ref 7–12)
PMV BLD AUTO: 9.2 FL (ref 7–12)
PMV BLD AUTO: 9.2 FL (ref 7–12)
PMV BLD AUTO: 9.3 FL (ref 7–12)
PO2: 111.3 MMHG (ref 75–100)
PO2: 131.4 MMHG (ref 75–100)
PO2: 144.3 MMHG (ref 75–100)
PO2: 182.4 MMHG (ref 75–100)
POTASSIUM SERPL-SCNC: 3.9 MMOL/L (ref 3.5–5)
POTASSIUM SERPL-SCNC: 4 MMOL/L (ref 3.5–5)
POTASSIUM SERPL-SCNC: 4.1 MMOL/L (ref 3.5–5)
POTASSIUM SERPL-SCNC: 4.1 MMOL/L (ref 3.5–5)
POTASSIUM SERPL-SCNC: 4.2 MMOL/L (ref 3.5–5)
POTASSIUM SERPL-SCNC: 4.6 MMOL/L (ref 3.5–5)
POTASSIUM SERPL-SCNC: 4.7 MMOL/L (ref 3.5–5)
POTASSIUM SERPL-SCNC: 4.8 MMOL/L (ref 3.5–5)
POTASSIUM SERPL-SCNC: 4.9 MMOL/L (ref 3.5–5)
PROCALCITONIN SERPL-MCNC: 0.22 NG/ML (ref 0–0.08)
PROT SERPL-MCNC: 5.8 G/DL (ref 6.4–8.3)
PROT SERPL-MCNC: 5.8 G/DL (ref 6.4–8.3)
PROT SERPL-MCNC: 5.9 G/DL (ref 6.4–8.3)
PROT SERPL-MCNC: 6 G/DL (ref 6.4–8.3)
PROT SERPL-MCNC: 6.1 G/DL (ref 6.4–8.3)
PROT SERPL-MCNC: 6.2 G/DL (ref 6.4–8.3)
PROT SERPL-MCNC: 6.6 G/DL (ref 6.4–8.3)
PROTEUS SP DNA BLD POS QL NAA+NON-PROBE: NOT DETECTED
RBC # BLD AUTO: 2.69 M/UL (ref 3.5–5.5)
RBC # BLD AUTO: 2.9 M/UL (ref 3.5–5.5)
RBC # BLD AUTO: 2.98 M/UL (ref 3.5–5.5)
RBC # BLD AUTO: 2.99 M/UL (ref 3.5–5.5)
RBC # BLD AUTO: 2.99 M/UL (ref 3.5–5.5)
RBC # BLD AUTO: 3 M/UL (ref 3.5–5.5)
RBC # BLD AUTO: 3.06 M/UL (ref 3.5–5.5)
RBC # BLD AUTO: 3.06 M/UL (ref 3.5–5.5)
RBC # BLD AUTO: 3.09 M/UL (ref 3.5–5.5)
RBC # BLD: ABNORMAL 10*6/UL
RR MECHANICAL: 20 B/MIN
RSV RNA NPH QL NAA+NON-PROBE: NOT DETECTED
RV+EV RNA NPH QL NAA+NON-PROBE: NOT DETECTED
S AUREUS DNA BLD POS QL NAA+NON-PROBE: NOT DETECTED
S AUREUS+CONS DNA BLD POS NAA+NON-PROBE: DETECTED
S EPIDERMIDIS DNA BLD POS QL NAA+NON-PRB: NOT DETECTED
S LUGDUNENSIS DNA BLD POS QL NAA+NON-PRB: NOT DETECTED
S MALTOPHILIA DNA BLD POS QL NAA+NON-PRB: NOT DETECTED
S MARCESCENS DNA BLD POS NAA+NON-PROBE: NOT DETECTED
S PNEUM DNA BLD POS QL NAA+NON-PROBE: NOT DETECTED
S PYO DNA BLD POS QL NAA+NON-PROBE: NOT DETECTED
SALMONELLA DNA BLD POS QL NAA+NON-PROBE: NOT DETECTED
SARS-COV-2 RNA NPH QL NAA+NON-PROBE: NOT DETECTED
SERVICE CMNT-IMP: ABNORMAL
SERVICE CMNT-IMP: NORMAL
SODIUM SERPL-SCNC: 140 MMOL/L (ref 132–146)
SODIUM SERPL-SCNC: 142 MMOL/L (ref 132–146)
SODIUM SERPL-SCNC: 143 MMOL/L (ref 132–146)
SODIUM SERPL-SCNC: 143 MMOL/L (ref 132–146)
SODIUM SERPL-SCNC: 144 MMOL/L (ref 132–146)
SODIUM SERPL-SCNC: 146 MMOL/L (ref 132–146)
SODIUM SERPL-SCNC: 147 MMOL/L (ref 132–146)
SODIUM UR-SCNC: <20 MMOL/L
SOURCE, BLOOD GAS: ABNORMAL
SPECIMEN DESCRIPTION: ABNORMAL
SPECIMEN DESCRIPTION: NORMAL
STREPTOCOCCUS DNA BLD POS NAA+NON-PROBE: NOT DETECTED
SURGICAL PATHOLOGY REPORT: NORMAL
THB: 7.8 G/DL (ref 11.5–16.5)
THB: 8.1 G/DL (ref 11.5–16.5)
THB: 8.3 G/DL (ref 11.5–16.5)
THB: 8.8 G/DL (ref 11.5–16.5)
TIME ANALYZED: 1413
TIME ANALYZED: 1636
TIME ANALYZED: 1636
TIME ANALYZED: 754
TRANSFUSION STATUS: NORMAL
TROPONIN I SERPL HS-MCNC: 14 NG/L (ref 0–9)
TROPONIN I SERPL HS-MCNC: 18 NG/L (ref 0–9)
TSH SERPL DL<=0.05 MIU/L-ACNC: 0.8 UIU/ML (ref 0.27–4.2)
UNIT DIVISION: 0
UNIT ISSUE DATE/TIME: NORMAL
VT MECHANICAL: 450 ML
WBC OTHER # BLD: 10.4 K/UL (ref 4.5–11.5)
WBC OTHER # BLD: 10.9 K/UL (ref 4.5–11.5)
WBC OTHER # BLD: 11.7 K/UL (ref 4.5–11.5)
WBC OTHER # BLD: 11.9 K/UL (ref 4.5–11.5)
WBC OTHER # BLD: 12.3 K/UL (ref 4.5–11.5)
WBC OTHER # BLD: 12.5 K/UL (ref 4.5–11.5)
WBC OTHER # BLD: 12.9 K/UL (ref 4.5–11.5)
WBC OTHER # BLD: 13.2 K/UL (ref 4.5–11.5)
WBC OTHER # BLD: 14.1 K/UL (ref 4.5–11.5)

## 2025-01-01 PROCEDURE — 2700000000 HC OXYGEN THERAPY PER DAY

## 2025-01-01 PROCEDURE — 99212 OFFICE O/P EST SF 10 MIN: CPT | Performed by: INTERNAL MEDICINE

## 2025-01-01 PROCEDURE — 2500000003 HC RX 250 WO HCPCS: Performed by: INTERNAL MEDICINE

## 2025-01-01 PROCEDURE — 6360000002 HC RX W HCPCS

## 2025-01-01 PROCEDURE — 2060000000 HC ICU INTERMEDIATE R&B

## 2025-01-01 PROCEDURE — 99231 SBSQ HOSP IP/OBS SF/LOW 25: CPT | Performed by: FAMILY MEDICINE

## 2025-01-01 PROCEDURE — 80053 COMPREHEN METABOLIC PANEL: CPT

## 2025-01-01 PROCEDURE — 6360000002 HC RX W HCPCS: Performed by: INTERNAL MEDICINE

## 2025-01-01 PROCEDURE — 82805 BLOOD GASES W/O2 SATURATION: CPT

## 2025-01-01 PROCEDURE — 84300 ASSAY OF URINE SODIUM: CPT

## 2025-01-01 PROCEDURE — 96375 TX/PRO/DX INJ NEW DRUG ADDON: CPT

## 2025-01-01 PROCEDURE — 0DB98ZX EXCISION OF DUODENUM, VIA NATURAL OR ARTIFICIAL OPENING ENDOSCOPIC, DIAGNOSTIC: ICD-10-PCS | Performed by: SURGERY

## 2025-01-01 PROCEDURE — 99222 1ST HOSP IP/OBS MODERATE 55: CPT

## 2025-01-01 PROCEDURE — 51798 US URINE CAPACITY MEASURE: CPT

## 2025-01-01 PROCEDURE — 99232 SBSQ HOSP IP/OBS MODERATE 35: CPT | Performed by: FAMILY MEDICINE

## 2025-01-01 PROCEDURE — 96374 THER/PROPH/DIAG INJ IV PUSH: CPT

## 2025-01-01 PROCEDURE — 6370000000 HC RX 637 (ALT 250 FOR IP)

## 2025-01-01 PROCEDURE — 85025 COMPLETE CBC W/AUTO DIFF WBC: CPT

## 2025-01-01 PROCEDURE — 84484 ASSAY OF TROPONIN QUANT: CPT

## 2025-01-01 PROCEDURE — 6370000000 HC RX 637 (ALT 250 FOR IP): Performed by: INTERNAL MEDICINE

## 2025-01-01 PROCEDURE — 86140 C-REACTIVE PROTEIN: CPT

## 2025-01-01 PROCEDURE — 97161 PT EVAL LOW COMPLEX 20 MIN: CPT

## 2025-01-01 PROCEDURE — 5A09457 ASSISTANCE WITH RESPIRATORY VENTILATION, 24-96 CONSECUTIVE HOURS, CONTINUOUS POSITIVE AIRWAY PRESSURE: ICD-10-PCS | Performed by: FAMILY MEDICINE

## 2025-01-01 PROCEDURE — 99285 EMERGENCY DEPT VISIT HI MDM: CPT

## 2025-01-01 PROCEDURE — 85652 RBC SED RATE AUTOMATED: CPT

## 2025-01-01 PROCEDURE — 6370000000 HC RX 637 (ALT 250 FOR IP): Performed by: FAMILY MEDICINE

## 2025-01-01 PROCEDURE — 87081 CULTURE SCREEN ONLY: CPT

## 2025-01-01 PROCEDURE — 2500000003 HC RX 250 WO HCPCS

## 2025-01-01 PROCEDURE — 2580000003 HC RX 258

## 2025-01-01 PROCEDURE — 94660 CPAP INITIATION&MGMT: CPT

## 2025-01-01 PROCEDURE — 99222 1ST HOSP IP/OBS MODERATE 55: CPT | Performed by: FAMILY MEDICINE

## 2025-01-01 PROCEDURE — 93005 ELECTROCARDIOGRAM TRACING: CPT

## 2025-01-01 PROCEDURE — 86900 BLOOD TYPING SEROLOGIC ABO: CPT

## 2025-01-01 PROCEDURE — 93010 ELECTROCARDIOGRAM REPORT: CPT | Performed by: INTERNAL MEDICINE

## 2025-01-01 PROCEDURE — 85018 HEMOGLOBIN: CPT

## 2025-01-01 PROCEDURE — 36600 WITHDRAWAL OF ARTERIAL BLOOD: CPT

## 2025-01-01 PROCEDURE — 86850 RBC ANTIBODY SCREEN: CPT

## 2025-01-01 PROCEDURE — 82436 ASSAY OF URINE CHLORIDE: CPT

## 2025-01-01 PROCEDURE — 0DB68ZX EXCISION OF STOMACH, VIA NATURAL OR ARTIFICIAL OPENING ENDOSCOPIC, DIAGNOSTIC: ICD-10-PCS | Performed by: SURGERY

## 2025-01-01 PROCEDURE — 71045 X-RAY EXAM CHEST 1 VIEW: CPT

## 2025-01-01 PROCEDURE — 94640 AIRWAY INHALATION TREATMENT: CPT

## 2025-01-01 PROCEDURE — 0202U NFCT DS 22 TRGT SARS-COV-2: CPT

## 2025-01-01 PROCEDURE — 2580000003 HC RX 258: Performed by: FAMILY MEDICINE

## 2025-01-01 PROCEDURE — 87150 DNA/RNA AMPLIFIED PROBE: CPT

## 2025-01-01 PROCEDURE — 6370000000 HC RX 637 (ALT 250 FOR IP): Performed by: SPECIALIST

## 2025-01-01 PROCEDURE — 99291 CRITICAL CARE FIRST HOUR: CPT | Performed by: INTERNAL MEDICINE

## 2025-01-01 PROCEDURE — 99232 SBSQ HOSP IP/OBS MODERATE 35: CPT | Performed by: INTERNAL MEDICINE

## 2025-01-01 PROCEDURE — 83880 ASSAY OF NATRIURETIC PEPTIDE: CPT

## 2025-01-01 PROCEDURE — 99222 1ST HOSP IP/OBS MODERATE 55: CPT | Performed by: SURGERY

## 2025-01-01 PROCEDURE — 86901 BLOOD TYPING SEROLOGIC RH(D): CPT

## 2025-01-01 PROCEDURE — 30233N1 TRANSFUSION OF NONAUTOLOGOUS RED BLOOD CELLS INTO PERIPHERAL VEIN, PERCUTANEOUS APPROACH: ICD-10-PCS | Performed by: FAMILY MEDICINE

## 2025-01-01 PROCEDURE — 71275 CT ANGIOGRAPHY CHEST: CPT

## 2025-01-01 PROCEDURE — 36415 COLL VENOUS BLD VENIPUNCTURE: CPT

## 2025-01-01 PROCEDURE — 94664 DEMO&/EVAL PT USE INHALER: CPT

## 2025-01-01 PROCEDURE — 0DJD8ZZ INSPECTION OF LOWER INTESTINAL TRACT, VIA NATURAL OR ARTIFICIAL OPENING ENDOSCOPIC: ICD-10-PCS | Performed by: SURGERY

## 2025-01-01 PROCEDURE — 6360000004 HC RX CONTRAST MEDICATION: Performed by: RADIOLOGY

## 2025-01-01 PROCEDURE — 83605 ASSAY OF LACTIC ACID: CPT

## 2025-01-01 PROCEDURE — 87899 AGENT NOS ASSAY W/OPTIC: CPT

## 2025-01-01 PROCEDURE — 85014 HEMATOCRIT: CPT

## 2025-01-01 PROCEDURE — 86923 COMPATIBILITY TEST ELECTRIC: CPT

## 2025-01-01 PROCEDURE — 88112 CYTOPATH CELL ENHANCE TECH: CPT

## 2025-01-01 PROCEDURE — 88342 IMHCHEM/IMCYTCHM 1ST ANTB: CPT

## 2025-01-01 PROCEDURE — 7100000010 HC PHASE II RECOVERY - FIRST 15 MIN: Performed by: SURGERY

## 2025-01-01 PROCEDURE — 88312 SPECIAL STAINS GROUP 1: CPT

## 2025-01-01 PROCEDURE — 0DD58ZX EXTRACTION OF ESOPHAGUS, VIA NATURAL OR ARTIFICIAL OPENING ENDOSCOPIC, DIAGNOSTIC: ICD-10-PCS | Performed by: SURGERY

## 2025-01-01 PROCEDURE — 99232 SBSQ HOSP IP/OBS MODERATE 35: CPT | Performed by: SURGERY

## 2025-01-01 PROCEDURE — 82570 ASSAY OF URINE CREATININE: CPT

## 2025-01-01 PROCEDURE — 88305 TISSUE EXAM BY PATHOLOGIST: CPT

## 2025-01-01 PROCEDURE — 36430 TRANSFUSION BLD/BLD COMPNT: CPT

## 2025-01-01 PROCEDURE — 3700000000 HC ANESTHESIA ATTENDED CARE: Performed by: SURGERY

## 2025-01-01 PROCEDURE — 94669 MECHANICAL CHEST WALL OSCILL: CPT

## 2025-01-01 PROCEDURE — 84443 ASSAY THYROID STIM HORMONE: CPT

## 2025-01-01 PROCEDURE — 84145 PROCALCITONIN (PCT): CPT

## 2025-01-01 PROCEDURE — 51702 INSERT TEMP BLADDER CATH: CPT

## 2025-01-01 PROCEDURE — 3600007511: Performed by: SURGERY

## 2025-01-01 PROCEDURE — 94667 MNPJ CHEST WALL 1ST: CPT

## 2025-01-01 PROCEDURE — 2709999900 HC NON-CHARGEABLE SUPPLY: Performed by: SURGERY

## 2025-01-01 PROCEDURE — 3600007501: Performed by: SURGERY

## 2025-01-01 PROCEDURE — 87040 BLOOD CULTURE FOR BACTERIA: CPT

## 2025-01-01 PROCEDURE — 97165 OT EVAL LOW COMPLEX 30 MIN: CPT

## 2025-01-01 PROCEDURE — 3700000001 HC ADD 15 MINUTES (ANESTHESIA): Performed by: SURGERY

## 2025-01-01 PROCEDURE — 5A0945A ASSISTANCE WITH RESPIRATORY VENTILATION, 24-96 CONSECUTIVE HOURS, HIGH NASAL FLOW/VELOCITY: ICD-10-PCS | Performed by: FAMILY MEDICINE

## 2025-01-01 PROCEDURE — 7100000011 HC PHASE II RECOVERY - ADDTL 15 MIN: Performed by: SURGERY

## 2025-01-01 PROCEDURE — P9016 RBC LEUKOCYTES REDUCED: HCPCS

## 2025-01-01 RX ORDER — DILTIAZEM HYDROCHLORIDE 180 MG/1
180 CAPSULE, COATED, EXTENDED RELEASE ORAL 2 TIMES DAILY
DISCHARGE
Start: 2025-01-01 | End: 2025-03-13

## 2025-01-01 RX ORDER — OMEGA-3/DHA/EPA/FISH OIL 60 MG-90MG
1 CAPSULE ORAL DAILY
DISCHARGE
Start: 2025-01-01 | End: 2025-01-01

## 2025-01-01 RX ORDER — AMMONIUM LACTATE 12 G/100G
LOTION TOPICAL PRN
Status: DISCONTINUED | OUTPATIENT
Start: 2025-01-01 | End: 2025-01-01 | Stop reason: HOSPADM

## 2025-01-01 RX ORDER — PANTOPRAZOLE SODIUM 40 MG/1
40 TABLET, DELAYED RELEASE ORAL 2 TIMES DAILY
DISCHARGE
Start: 2025-01-01 | End: 2025-01-01

## 2025-01-01 RX ORDER — CETIRIZINE HYDROCHLORIDE 10 MG/1
10 TABLET ORAL DAILY
Status: DISCONTINUED | OUTPATIENT
Start: 2025-01-01 | End: 2025-01-01 | Stop reason: HOSPADM

## 2025-01-01 RX ORDER — ASPIRIN 81 MG/1
81 TABLET, CHEWABLE ORAL DAILY
DISCHARGE
Start: 2025-01-01 | End: 2025-03-13

## 2025-01-01 RX ORDER — PREDNISONE 5 MG/1
TABLET ORAL
DISCHARGE
Start: 2025-01-01 | End: 2025-01-01

## 2025-01-01 RX ORDER — PREDNISONE 20 MG/1
40 TABLET ORAL DAILY
Status: DISCONTINUED | OUTPATIENT
Start: 2025-01-01 | End: 2025-01-01

## 2025-01-01 RX ORDER — AMLODIPINE BESYLATE 5 MG/1
5 TABLET ORAL DAILY
Status: DISCONTINUED | OUTPATIENT
Start: 2025-01-01 | End: 2025-01-01

## 2025-01-01 RX ORDER — ONDANSETRON 4 MG/1
4 TABLET, ORALLY DISINTEGRATING ORAL EVERY 8 HOURS PRN
Status: DISCONTINUED | OUTPATIENT
Start: 2025-01-01 | End: 2025-01-01 | Stop reason: HOSPADM

## 2025-01-01 RX ORDER — ARIPIPRAZOLE 5 MG/1
5 TABLET ORAL NIGHTLY
Status: DISCONTINUED | OUTPATIENT
Start: 2025-01-01 | End: 2025-01-01 | Stop reason: HOSPADM

## 2025-01-01 RX ORDER — IOPAMIDOL 755 MG/ML
75 INJECTION, SOLUTION INTRAVASCULAR
Status: COMPLETED | OUTPATIENT
Start: 2025-01-01 | End: 2025-01-01

## 2025-01-01 RX ORDER — FERROUS SULFATE 325(65) MG
325 TABLET ORAL EVERY OTHER DAY
DISCHARGE
Start: 2025-01-01 | End: 2025-01-01

## 2025-01-01 RX ORDER — IPRATROPIUM BROMIDE AND ALBUTEROL SULFATE 2.5; .5 MG/3ML; MG/3ML
1 SOLUTION RESPIRATORY (INHALATION)
Status: DISCONTINUED | OUTPATIENT
Start: 2025-01-01 | End: 2025-01-01

## 2025-01-01 RX ORDER — BUDESONIDE 0.25 MG/2ML
250 INHALANT ORAL 2 TIMES DAILY
Qty: 60 EACH | Refills: 3 | Status: SHIPPED
Start: 2025-01-01 | End: 2025-03-13

## 2025-01-01 RX ORDER — PSEUDOEPHEDRINE HCL 30 MG
100 TABLET ORAL 2 TIMES DAILY PRN
DISCHARGE
Start: 2025-01-01 | End: 2025-01-01

## 2025-01-01 RX ORDER — SENNOSIDES A AND B 8.6 MG/1
1 TABLET, FILM COATED ORAL 2 TIMES DAILY
DISCHARGE
Start: 2025-01-01 | End: 2025-03-13

## 2025-01-01 RX ORDER — FUROSEMIDE 10 MG/ML
40 INJECTION INTRAMUSCULAR; INTRAVENOUS ONCE
Status: COMPLETED | OUTPATIENT
Start: 2025-01-01 | End: 2025-01-01

## 2025-01-01 RX ORDER — SODIUM CHLORIDE 9 MG/ML
INJECTION, SOLUTION INTRAVENOUS PRN
Status: DISCONTINUED | OUTPATIENT
Start: 2025-01-01 | End: 2025-01-01 | Stop reason: HOSPADM

## 2025-01-01 RX ORDER — FLUCONAZOLE 200 MG/1
200 TABLET ORAL DAILY
Qty: 14 TABLET | Refills: 0 | Status: SHIPPED | OUTPATIENT
Start: 2025-01-01 | End: 2025-03-13

## 2025-01-01 RX ORDER — ARIPIPRAZOLE 5 MG/1
5 TABLET ORAL NIGHTLY
DISCHARGE
Start: 2025-01-01 | End: 2025-03-13

## 2025-01-01 RX ORDER — AZITHROMYCIN 250 MG/1
500 TABLET, FILM COATED ORAL DAILY
Status: DISCONTINUED | OUTPATIENT
Start: 2025-01-01 | End: 2025-01-01

## 2025-01-01 RX ORDER — FOLIC ACID 1 MG/1
1 TABLET ORAL DAILY
DISCHARGE
Start: 2025-01-01 | End: 2025-01-01

## 2025-01-01 RX ORDER — ASCORBIC ACID 500 MG
500 TABLET ORAL DAILY
DISCHARGE
Start: 2025-01-01 | End: 2025-01-01

## 2025-01-01 RX ORDER — 0.9 % SODIUM CHLORIDE 0.9 %
500 INTRAVENOUS SOLUTION INTRAVENOUS ONCE
Status: COMPLETED | OUTPATIENT
Start: 2025-01-01 | End: 2025-01-01

## 2025-01-01 RX ORDER — SERTRALINE HYDROCHLORIDE 100 MG/1
100 TABLET, FILM COATED ORAL NIGHTLY
DISCHARGE
Start: 2025-01-01 | End: 2025-03-13

## 2025-01-01 RX ORDER — AMMONIUM LACTATE 12 G/100G
LOTION TOPICAL
DISCHARGE
Start: 2025-01-01 | End: 2025-01-01

## 2025-01-01 RX ORDER — SODIUM CHLORIDE 0.9 % (FLUSH) 0.9 %
5-40 SYRINGE (ML) INJECTION PRN
Status: DISCONTINUED | OUTPATIENT
Start: 2025-01-01 | End: 2025-01-01 | Stop reason: HOSPADM

## 2025-01-01 RX ORDER — ALPRAZOLAM 0.25 MG
0.25 TABLET ORAL NIGHTLY PRN
Status: DISCONTINUED | OUTPATIENT
Start: 2025-01-01 | End: 2025-01-01 | Stop reason: HOSPADM

## 2025-01-01 RX ORDER — METHYLPREDNISOLONE SODIUM SUCCINATE 40 MG/ML
40 INJECTION INTRAMUSCULAR; INTRAVENOUS EVERY 12 HOURS
Status: DISCONTINUED | OUTPATIENT
Start: 2025-01-01 | End: 2025-01-01

## 2025-01-01 RX ORDER — AMLODIPINE BESYLATE 5 MG/1
5 TABLET ORAL DAILY
Status: DISCONTINUED | OUTPATIENT
Start: 2025-01-01 | End: 2025-01-01 | Stop reason: CLARIF

## 2025-01-01 RX ORDER — METHYLPREDNISOLONE SODIUM SUCCINATE 40 MG/ML
40 INJECTION INTRAMUSCULAR; INTRAVENOUS EVERY 8 HOURS
Status: DISCONTINUED | OUTPATIENT
Start: 2025-01-01 | End: 2025-01-01

## 2025-01-01 RX ORDER — ALBUTEROL SULFATE 90 UG/1
2 INHALANT RESPIRATORY (INHALATION) 4 TIMES DAILY PRN
DISCHARGE
Start: 2025-01-01 | End: 2025-03-13

## 2025-01-01 RX ORDER — MULTIVIT-MIN/IRON/FOLIC ACID/K 18-600-40
2000 CAPSULE ORAL DAILY
DISCHARGE
Start: 2025-01-01 | End: 2025-03-13

## 2025-01-01 RX ORDER — IPRATROPIUM BROMIDE AND ALBUTEROL SULFATE 2.5; .5 MG/3ML; MG/3ML
3 SOLUTION RESPIRATORY (INHALATION) ONCE
Status: COMPLETED | OUTPATIENT
Start: 2025-01-01 | End: 2025-01-01

## 2025-01-01 RX ORDER — ALBUTEROL SULFATE 90 UG/1
2 INHALANT RESPIRATORY (INHALATION) 4 TIMES DAILY PRN
DISCHARGE
Start: 2025-01-01 | End: 2025-01-01

## 2025-01-01 RX ORDER — METOPROLOL TARTRATE 25 MG/1
25 TABLET, FILM COATED ORAL 2 TIMES DAILY
Status: DISCONTINUED | OUTPATIENT
Start: 2025-01-01 | End: 2025-01-01 | Stop reason: HOSPADM

## 2025-01-01 RX ORDER — IPRATROPIUM BROMIDE AND ALBUTEROL SULFATE 2.5; .5 MG/3ML; MG/3ML
1 SOLUTION RESPIRATORY (INHALATION) EVERY 4 HOURS
DISCHARGE
Start: 2025-01-01 | End: 2025-03-13

## 2025-01-01 RX ORDER — LINEZOLID 600 MG/1
600 TABLET, FILM COATED ORAL EVERY 12 HOURS SCHEDULED
Status: DISCONTINUED | OUTPATIENT
Start: 2025-01-01 | End: 2025-01-01

## 2025-01-01 RX ORDER — AMMONIUM LACTATE 12 G/100G
LOTION TOPICAL
DISCHARGE
Start: 2025-01-01 | End: 2025-03-13

## 2025-01-01 RX ORDER — AMLODIPINE BESYLATE 5 MG/1
5 TABLET ORAL DAILY
DISCHARGE
Start: 2025-01-01 | End: 2025-01-01 | Stop reason: HOSPADM

## 2025-01-01 RX ORDER — ALPRAZOLAM 0.25 MG
TABLET ORAL
Status: SHIPPED | DISCHARGE
Start: 2025-01-01 | End: 2025-01-01

## 2025-01-01 RX ORDER — ACETAMINOPHEN 650 MG/1
650 SUPPOSITORY RECTAL EVERY 6 HOURS PRN
Status: DISCONTINUED | OUTPATIENT
Start: 2025-01-01 | End: 2025-01-01 | Stop reason: HOSPADM

## 2025-01-01 RX ORDER — ARFORMOTEROL TARTRATE 15 UG/2ML
15 SOLUTION RESPIRATORY (INHALATION)
Status: DISCONTINUED | OUTPATIENT
Start: 2025-01-01 | End: 2025-01-01 | Stop reason: HOSPADM

## 2025-01-01 RX ORDER — DILTIAZEM HYDROCHLORIDE 180 MG/1
180 CAPSULE, COATED, EXTENDED RELEASE ORAL 2 TIMES DAILY
Status: DISCONTINUED | OUTPATIENT
Start: 2025-01-01 | End: 2025-01-01 | Stop reason: HOSPADM

## 2025-01-01 RX ORDER — PREDNISONE 20 MG/1
20 TABLET ORAL DAILY
Status: DISCONTINUED | OUTPATIENT
Start: 2025-01-01 | End: 2025-01-01 | Stop reason: HOSPADM

## 2025-01-01 RX ORDER — ASCORBIC ACID 500 MG
500 TABLET ORAL DAILY
DISCHARGE
Start: 2025-01-01 | End: 2025-03-13

## 2025-01-01 RX ORDER — POLYETHYLENE GLYCOL 3350 17 G/17G
238 POWDER, FOR SOLUTION ORAL ONCE
Status: COMPLETED | OUTPATIENT
Start: 2025-01-01 | End: 2025-01-01

## 2025-01-01 RX ORDER — METOPROLOL TARTRATE 25 MG/1
25 TABLET, FILM COATED ORAL 2 TIMES DAILY
DISCHARGE
Start: 2025-01-01 | End: 2025-01-01

## 2025-01-01 RX ORDER — SENNOSIDES A AND B 8.6 MG/1
1 TABLET, FILM COATED ORAL 2 TIMES DAILY
DISCHARGE
Start: 2025-01-01 | End: 2025-01-01

## 2025-01-01 RX ORDER — POLYETHYLENE GLYCOL 3350 17 G/17G
17 POWDER, FOR SOLUTION ORAL DAILY PRN
Status: DISCONTINUED | OUTPATIENT
Start: 2025-01-01 | End: 2025-01-01 | Stop reason: HOSPADM

## 2025-01-01 RX ORDER — METOPROLOL TARTRATE 25 MG/1
25 TABLET, FILM COATED ORAL 2 TIMES DAILY
DISCHARGE
Start: 2025-01-01 | End: 2025-03-13

## 2025-01-01 RX ORDER — POLYETHYLENE GLYCOL 3350 17 G/17G
17 POWDER, FOR SOLUTION ORAL DAILY PRN
DISCHARGE
Start: 2025-01-01 | End: 2025-03-13

## 2025-01-01 RX ORDER — FOLIC ACID 1 MG/1
1 TABLET ORAL DAILY
DISCHARGE
Start: 2025-01-01 | End: 2025-03-13

## 2025-01-01 RX ORDER — ACETAMINOPHEN 325 MG/1
650 TABLET ORAL EVERY 6 HOURS PRN
Status: DISCONTINUED | OUTPATIENT
Start: 2025-01-01 | End: 2025-01-01 | Stop reason: HOSPADM

## 2025-01-01 RX ORDER — FERROUS SULFATE 325(65) MG
325 TABLET ORAL EVERY OTHER DAY
DISCHARGE
Start: 2025-01-01 | End: 2025-03-13

## 2025-01-01 RX ORDER — ONDANSETRON 2 MG/ML
4 INJECTION INTRAMUSCULAR; INTRAVENOUS EVERY 6 HOURS PRN
Status: DISCONTINUED | OUTPATIENT
Start: 2025-01-01 | End: 2025-01-01 | Stop reason: HOSPADM

## 2025-01-01 RX ORDER — ASPIRIN 81 MG/1
81 TABLET, CHEWABLE ORAL DAILY
DISCHARGE
Start: 2025-01-01 | End: 2025-01-01

## 2025-01-01 RX ORDER — IPRATROPIUM BROMIDE AND ALBUTEROL SULFATE 2.5; .5 MG/3ML; MG/3ML
1 SOLUTION RESPIRATORY (INHALATION)
Status: DISCONTINUED | OUTPATIENT
Start: 2025-01-01 | End: 2025-01-01 | Stop reason: HOSPADM

## 2025-01-01 RX ORDER — 0.9 % SODIUM CHLORIDE 0.9 %
1000 INTRAVENOUS SOLUTION INTRAVENOUS ONCE
Status: COMPLETED | OUTPATIENT
Start: 2025-01-01 | End: 2025-01-01

## 2025-01-01 RX ORDER — ARIPIPRAZOLE 5 MG/1
5 TABLET ORAL NIGHTLY
DISCHARGE
Start: 2025-01-01 | End: 2025-01-01

## 2025-01-01 RX ORDER — MULTIVIT-MIN/IRON/FOLIC ACID/K 18-600-40
2000 CAPSULE ORAL DAILY
DISCHARGE
Start: 2025-01-01 | End: 2025-01-01

## 2025-01-01 RX ORDER — CETIRIZINE HYDROCHLORIDE 10 MG/1
10 TABLET ORAL DAILY
DISCHARGE
Start: 2025-01-01 | End: 2025-03-13

## 2025-01-01 RX ORDER — METHYLPREDNISOLONE SODIUM SUCCINATE 125 MG/2ML
125 INJECTION INTRAMUSCULAR; INTRAVENOUS ONCE
Status: COMPLETED | OUTPATIENT
Start: 2025-01-01 | End: 2025-01-01

## 2025-01-01 RX ORDER — ACETAZOLAMIDE 250 MG/1
250 TABLET ORAL DAILY
Status: DISCONTINUED | OUTPATIENT
Start: 2025-01-01 | End: 2025-01-01

## 2025-01-01 RX ORDER — SERTRALINE HYDROCHLORIDE 100 MG/1
100 TABLET, FILM COATED ORAL NIGHTLY
DISCHARGE
Start: 2025-01-01 | End: 2025-01-01

## 2025-01-01 RX ORDER — PSEUDOEPHEDRINE HCL 30 MG
100 TABLET ORAL 2 TIMES DAILY PRN
DISCHARGE
Start: 2025-01-01 | End: 2025-03-13

## 2025-01-01 RX ORDER — DILTIAZEM HYDROCHLORIDE 180 MG/1
180 CAPSULE, COATED, EXTENDED RELEASE ORAL 2 TIMES DAILY
DISCHARGE
Start: 2025-01-01 | End: 2025-01-01

## 2025-01-01 RX ORDER — BUDESONIDE 0.5 MG/2ML
0.5 INHALANT ORAL
Status: DISCONTINUED | OUTPATIENT
Start: 2025-01-01 | End: 2025-01-01 | Stop reason: HOSPADM

## 2025-01-01 RX ORDER — POLYETHYLENE GLYCOL 3350 17 G/17G
17 POWDER, FOR SOLUTION ORAL DAILY PRN
DISCHARGE
Start: 2025-01-01 | End: 2025-01-01

## 2025-01-01 RX ORDER — IPRATROPIUM BROMIDE AND ALBUTEROL SULFATE 2.5; .5 MG/3ML; MG/3ML
1 SOLUTION RESPIRATORY (INHALATION) EVERY 6 HOURS
Qty: 360 ML | Refills: 12 | Status: SHIPPED | OUTPATIENT
Start: 2025-01-01 | End: 2025-01-01

## 2025-01-01 RX ORDER — SERTRALINE HYDROCHLORIDE 100 MG/1
100 TABLET, FILM COATED ORAL NIGHTLY
Status: DISCONTINUED | OUTPATIENT
Start: 2025-01-01 | End: 2025-01-01 | Stop reason: HOSPADM

## 2025-01-01 RX ORDER — PREDNISONE 5 MG/1
TABLET ORAL
Qty: 21 TABLET | Refills: 0 | Status: SHIPPED | OUTPATIENT
Start: 2025-01-01 | End: 2025-03-13

## 2025-01-01 RX ORDER — CETIRIZINE HYDROCHLORIDE 10 MG/1
10 TABLET ORAL DAILY
DISCHARGE
Start: 2025-01-01 | End: 2025-01-01

## 2025-01-01 RX ORDER — PANTOPRAZOLE SODIUM 40 MG/1
40 TABLET, DELAYED RELEASE ORAL 2 TIMES DAILY
DISCHARGE
Start: 2025-01-01 | End: 2025-03-13

## 2025-01-01 RX ORDER — OMEGA-3/DHA/EPA/FISH OIL 60 MG-90MG
1 CAPSULE ORAL DAILY
DISCHARGE
Start: 2025-01-01 | End: 2025-03-13

## 2025-01-01 RX ORDER — PANTOPRAZOLE SODIUM 40 MG/1
40 TABLET, DELAYED RELEASE ORAL
Status: DISCONTINUED | OUTPATIENT
Start: 2025-01-01 | End: 2025-01-01 | Stop reason: HOSPADM

## 2025-01-01 RX ORDER — ALPRAZOLAM 0.25 MG
TABLET ORAL
Status: SHIPPED | DISCHARGE
Start: 2025-01-01 | End: 2025-03-13

## 2025-01-01 RX ORDER — SODIUM CHLORIDE 0.9 % (FLUSH) 0.9 %
5-40 SYRINGE (ML) INJECTION EVERY 12 HOURS SCHEDULED
Status: DISCONTINUED | OUTPATIENT
Start: 2025-01-01 | End: 2025-01-01 | Stop reason: HOSPADM

## 2025-01-01 RX ORDER — IPRATROPIUM BROMIDE AND ALBUTEROL SULFATE 2.5; .5 MG/3ML; MG/3ML
1 SOLUTION RESPIRATORY (INHALATION) 3 TIMES DAILY
Status: CANCELLED | DISCHARGE
Start: 2025-01-01

## 2025-01-01 RX ADMIN — WATER 2000 MG: 1 INJECTION INTRAMUSCULAR; INTRAVENOUS; SUBCUTANEOUS at 01:12

## 2025-01-01 RX ADMIN — SERTRALINE 100 MG: 100 TABLET, FILM COATED ORAL at 21:11

## 2025-01-01 RX ADMIN — AMLODIPINE BESYLATE 5 MG: 5 TABLET ORAL at 08:53

## 2025-01-01 RX ADMIN — AMLODIPINE BESYLATE 5 MG: 5 TABLET ORAL at 08:12

## 2025-01-01 RX ADMIN — IPRATROPIUM BROMIDE AND ALBUTEROL SULFATE 1 DOSE: .5; 2.5 SOLUTION RESPIRATORY (INHALATION) at 16:44

## 2025-01-01 RX ADMIN — BUDESONIDE INHALATION 500 MCG: 0.5 SUSPENSION RESPIRATORY (INHALATION) at 20:36

## 2025-01-01 RX ADMIN — WATER 2000 MG: 1 INJECTION INTRAMUSCULAR; INTRAVENOUS; SUBCUTANEOUS at 12:10

## 2025-01-01 RX ADMIN — ARFORMOTEROL TARTRATE 15 MCG: 15 SOLUTION RESPIRATORY (INHALATION) at 09:23

## 2025-01-01 RX ADMIN — DILTIAZEM HYDROCHLORIDE 180 MG: 180 CAPSULE, COATED, EXTENDED RELEASE ORAL at 07:57

## 2025-01-01 RX ADMIN — PREDNISONE 20 MG: 20 TABLET ORAL at 08:40

## 2025-01-01 RX ADMIN — IPRATROPIUM BROMIDE AND ALBUTEROL SULFATE 1 DOSE: .5; 2.5 SOLUTION RESPIRATORY (INHALATION) at 09:08

## 2025-01-01 RX ADMIN — BUDESONIDE INHALATION 500 MCG: 0.5 SUSPENSION RESPIRATORY (INHALATION) at 21:01

## 2025-01-01 RX ADMIN — SODIUM CHLORIDE 40 MG: 9 INJECTION INTRAMUSCULAR; INTRAVENOUS; SUBCUTANEOUS at 23:55

## 2025-01-01 RX ADMIN — PREDNISONE 20 MG: 20 TABLET ORAL at 07:58

## 2025-01-01 RX ADMIN — SERTRALINE 100 MG: 100 TABLET, FILM COATED ORAL at 20:40

## 2025-01-01 RX ADMIN — SODIUM CHLORIDE, PRESERVATIVE FREE 10 ML: 5 INJECTION INTRAVENOUS at 07:58

## 2025-01-01 RX ADMIN — CEFEPIME 2000 MG: 2 INJECTION, POWDER, FOR SOLUTION INTRAVENOUS at 19:18

## 2025-01-01 RX ADMIN — IPRATROPIUM BROMIDE AND ALBUTEROL SULFATE 1 DOSE: .5; 2.5 SOLUTION RESPIRATORY (INHALATION) at 23:57

## 2025-01-01 RX ADMIN — IPRATROPIUM BROMIDE AND ALBUTEROL SULFATE 1 DOSE: .5; 2.5 SOLUTION RESPIRATORY (INHALATION) at 23:56

## 2025-01-01 RX ADMIN — METHYLPREDNISOLONE SODIUM SUCCINATE 125 MG: 125 INJECTION INTRAMUSCULAR; INTRAVENOUS at 15:01

## 2025-01-01 RX ADMIN — SODIUM CHLORIDE, PRESERVATIVE FREE 10 ML: 5 INJECTION INTRAVENOUS at 08:04

## 2025-01-01 RX ADMIN — SODIUM CHLORIDE, PRESERVATIVE FREE 10 ML: 5 INJECTION INTRAVENOUS at 20:48

## 2025-01-01 RX ADMIN — BUDESONIDE INHALATION 500 MCG: 0.5 SUSPENSION RESPIRATORY (INHALATION) at 19:21

## 2025-01-01 RX ADMIN — WATER 2000 MG: 1 INJECTION INTRAMUSCULAR; INTRAVENOUS; SUBCUTANEOUS at 14:08

## 2025-01-01 RX ADMIN — IPRATROPIUM BROMIDE AND ALBUTEROL SULFATE 1 DOSE: .5; 2.5 SOLUTION RESPIRATORY (INHALATION) at 21:21

## 2025-01-01 RX ADMIN — AMLODIPINE BESYLATE 5 MG: 5 TABLET ORAL at 08:10

## 2025-01-01 RX ADMIN — DILTIAZEM HYDROCHLORIDE 180 MG: 180 CAPSULE, COATED, EXTENDED RELEASE ORAL at 20:47

## 2025-01-01 RX ADMIN — IPRATROPIUM BROMIDE AND ALBUTEROL SULFATE 1 DOSE: .5; 2.5 SOLUTION RESPIRATORY (INHALATION) at 04:40

## 2025-01-01 RX ADMIN — LINEZOLID 600 MG: 600 TABLET, FILM COATED ORAL at 07:58

## 2025-01-01 RX ADMIN — IPRATROPIUM BROMIDE AND ALBUTEROL SULFATE 1 DOSE: .5; 2.5 SOLUTION RESPIRATORY (INHALATION) at 16:53

## 2025-01-01 RX ADMIN — ARIPIPRAZOLE 5 MG: 5 TABLET ORAL at 20:47

## 2025-01-01 RX ADMIN — IPRATROPIUM BROMIDE AND ALBUTEROL SULFATE 1 DOSE: .5; 2.5 SOLUTION RESPIRATORY (INHALATION) at 20:19

## 2025-01-01 RX ADMIN — IPRATROPIUM BROMIDE AND ALBUTEROL SULFATE 1 DOSE: .5; 2.5 SOLUTION RESPIRATORY (INHALATION) at 20:32

## 2025-01-01 RX ADMIN — ARFORMOTEROL TARTRATE 15 MCG: 15 SOLUTION RESPIRATORY (INHALATION) at 09:09

## 2025-01-01 RX ADMIN — ARFORMOTEROL TARTRATE 15 MCG: 15 SOLUTION RESPIRATORY (INHALATION) at 20:02

## 2025-01-01 RX ADMIN — IPRATROPIUM BROMIDE AND ALBUTEROL SULFATE 1 DOSE: .5; 2.5 SOLUTION RESPIRATORY (INHALATION) at 20:36

## 2025-01-01 RX ADMIN — SODIUM CHLORIDE 40 MG: 9 INJECTION INTRAMUSCULAR; INTRAVENOUS; SUBCUTANEOUS at 08:10

## 2025-01-01 RX ADMIN — BUDESONIDE INHALATION 500 MCG: 0.5 SUSPENSION RESPIRATORY (INHALATION) at 09:04

## 2025-01-01 RX ADMIN — IPRATROPIUM BROMIDE AND ALBUTEROL SULFATE 1 DOSE: .5; 2.5 SOLUTION RESPIRATORY (INHALATION) at 12:39

## 2025-01-01 RX ADMIN — IPRATROPIUM BROMIDE AND ALBUTEROL SULFATE 1 DOSE: .5; 2.5 SOLUTION RESPIRATORY (INHALATION) at 20:02

## 2025-01-01 RX ADMIN — CETIRIZINE HYDROCHLORIDE 10 MG: 10 TABLET, FILM COATED ORAL at 10:49

## 2025-01-01 RX ADMIN — IPRATROPIUM BROMIDE AND ALBUTEROL SULFATE 1 DOSE: .5; 2.5 SOLUTION RESPIRATORY (INHALATION) at 16:26

## 2025-01-01 RX ADMIN — LINEZOLID 600 MG: 600 TABLET, FILM COATED ORAL at 21:08

## 2025-01-01 RX ADMIN — FUROSEMIDE 40 MG: 10 INJECTION, SOLUTION INTRAMUSCULAR; INTRAVENOUS at 09:54

## 2025-01-01 RX ADMIN — ARFORMOTEROL TARTRATE 15 MCG: 15 SOLUTION RESPIRATORY (INHALATION) at 09:04

## 2025-01-01 RX ADMIN — ARFORMOTEROL TARTRATE 15 MCG: 15 SOLUTION RESPIRATORY (INHALATION) at 05:15

## 2025-01-01 RX ADMIN — ALPRAZOLAM 0.25 MG: 0.25 TABLET ORAL at 20:47

## 2025-01-01 RX ADMIN — DILTIAZEM HYDROCHLORIDE 180 MG: 180 CAPSULE, COATED, EXTENDED RELEASE ORAL at 21:11

## 2025-01-01 RX ADMIN — PREDNISONE 20 MG: 20 TABLET ORAL at 08:12

## 2025-01-01 RX ADMIN — PANTOPRAZOLE SODIUM 40 MG: 40 TABLET, DELAYED RELEASE ORAL at 17:44

## 2025-01-01 RX ADMIN — BUDESONIDE INHALATION 500 MCG: 0.5 SUSPENSION RESPIRATORY (INHALATION) at 08:33

## 2025-01-01 RX ADMIN — SERTRALINE 100 MG: 100 TABLET, FILM COATED ORAL at 21:09

## 2025-01-01 RX ADMIN — METOPROLOL TARTRATE 25 MG: 25 TABLET, FILM COATED ORAL at 21:11

## 2025-01-01 RX ADMIN — PETROLATUM: 420 OINTMENT TOPICAL at 08:46

## 2025-01-01 RX ADMIN — BUDESONIDE INHALATION 500 MCG: 0.5 SUSPENSION RESPIRATORY (INHALATION) at 20:35

## 2025-01-01 RX ADMIN — ARFORMOTEROL TARTRATE 15 MCG: 15 SOLUTION RESPIRATORY (INHALATION) at 19:20

## 2025-01-01 RX ADMIN — WATER 2000 MG: 1 INJECTION INTRAMUSCULAR; INTRAVENOUS; SUBCUTANEOUS at 00:37

## 2025-01-01 RX ADMIN — BUDESONIDE INHALATION 500 MCG: 0.5 SUSPENSION RESPIRATORY (INHALATION) at 09:09

## 2025-01-01 RX ADMIN — AZITHROMYCIN 500 MG: 250 TABLET, FILM COATED ORAL at 08:02

## 2025-01-01 RX ADMIN — IPRATROPIUM BROMIDE AND ALBUTEROL SULFATE 1 DOSE: .5; 2.5 SOLUTION RESPIRATORY (INHALATION) at 08:33

## 2025-01-01 RX ADMIN — DRONEDARONE 400 MG: 400 TABLET, FILM COATED ORAL at 23:55

## 2025-01-01 RX ADMIN — METHYLPREDNISOLONE SODIUM SUCCINATE 40 MG: 40 INJECTION INTRAMUSCULAR; INTRAVENOUS at 06:23

## 2025-01-01 RX ADMIN — DILTIAZEM HYDROCHLORIDE 180 MG: 180 CAPSULE, COATED, EXTENDED RELEASE ORAL at 07:59

## 2025-01-01 RX ADMIN — ARFORMOTEROL TARTRATE 15 MCG: 15 SOLUTION RESPIRATORY (INHALATION) at 08:59

## 2025-01-01 RX ADMIN — WATER 2000 MG: 1 INJECTION INTRAMUSCULAR; INTRAVENOUS; SUBCUTANEOUS at 11:45

## 2025-01-01 RX ADMIN — SERTRALINE 100 MG: 100 TABLET, FILM COATED ORAL at 19:46

## 2025-01-01 RX ADMIN — ARIPIPRAZOLE 5 MG: 5 TABLET ORAL at 23:55

## 2025-01-01 RX ADMIN — BUDESONIDE INHALATION 500 MCG: 0.5 SUSPENSION RESPIRATORY (INHALATION) at 05:15

## 2025-01-01 RX ADMIN — DRONEDARONE 400 MG: 400 TABLET, FILM COATED ORAL at 16:06

## 2025-01-01 RX ADMIN — METHYLPREDNISOLONE SODIUM SUCCINATE 40 MG: 40 INJECTION, POWDER, LYOPHILIZED, FOR SOLUTION INTRAMUSCULAR; INTRAVENOUS at 06:51

## 2025-01-01 RX ADMIN — DILTIAZEM HYDROCHLORIDE 180 MG: 180 CAPSULE, COATED, EXTENDED RELEASE ORAL at 20:01

## 2025-01-01 RX ADMIN — ARFORMOTEROL TARTRATE 15 MCG: 15 SOLUTION RESPIRATORY (INHALATION) at 20:35

## 2025-01-01 RX ADMIN — METOPROLOL TARTRATE 25 MG: 25 TABLET, FILM COATED ORAL at 08:10

## 2025-01-01 RX ADMIN — VANCOMYCIN HYDROCHLORIDE 1750 MG: 5 INJECTION, POWDER, LYOPHILIZED, FOR SOLUTION INTRAVENOUS at 20:53

## 2025-01-01 RX ADMIN — SODIUM CHLORIDE, PRESERVATIVE FREE 10 ML: 5 INJECTION INTRAVENOUS at 21:09

## 2025-01-01 RX ADMIN — SODIUM CHLORIDE, PRESERVATIVE FREE 10 ML: 5 INJECTION INTRAVENOUS at 20:47

## 2025-01-01 RX ADMIN — IPRATROPIUM BROMIDE AND ALBUTEROL SULFATE 1 DOSE: .5; 2.5 SOLUTION RESPIRATORY (INHALATION) at 13:24

## 2025-01-01 RX ADMIN — LINEZOLID 600 MG: 600 TABLET, FILM COATED ORAL at 20:47

## 2025-01-01 RX ADMIN — ALPRAZOLAM 0.25 MG: 0.25 TABLET ORAL at 20:50

## 2025-01-01 RX ADMIN — IPRATROPIUM BROMIDE AND ALBUTEROL SULFATE 1 DOSE: .5; 2.5 SOLUTION RESPIRATORY (INHALATION) at 13:35

## 2025-01-01 RX ADMIN — DILTIAZEM HYDROCHLORIDE 180 MG: 180 CAPSULE, COATED, EXTENDED RELEASE ORAL at 21:09

## 2025-01-01 RX ADMIN — LINEZOLID 600 MG: 600 TABLET, FILM COATED ORAL at 08:53

## 2025-01-01 RX ADMIN — IOPAMIDOL 75 ML: 755 INJECTION, SOLUTION INTRAVENOUS at 19:31

## 2025-01-01 RX ADMIN — IPRATROPIUM BROMIDE AND ALBUTEROL SULFATE 1 DOSE: .5; 2.5 SOLUTION RESPIRATORY (INHALATION) at 20:47

## 2025-01-01 RX ADMIN — DRONEDARONE 400 MG: 400 TABLET, FILM COATED ORAL at 16:53

## 2025-01-01 RX ADMIN — DILTIAZEM HYDROCHLORIDE 180 MG: 180 CAPSULE, COATED, EXTENDED RELEASE ORAL at 23:55

## 2025-01-01 RX ADMIN — METOPROLOL TARTRATE 25 MG: 25 TABLET, FILM COATED ORAL at 20:51

## 2025-01-01 RX ADMIN — WATER 2000 MG: 1 INJECTION INTRAMUSCULAR; INTRAVENOUS; SUBCUTANEOUS at 00:30

## 2025-01-01 RX ADMIN — IPRATROPIUM BROMIDE AND ALBUTEROL SULFATE 1 DOSE: .5; 2.5 SOLUTION RESPIRATORY (INHALATION) at 01:17

## 2025-01-01 RX ADMIN — SERTRALINE 100 MG: 100 TABLET, FILM COATED ORAL at 20:47

## 2025-01-01 RX ADMIN — PETROLATUM: 420 OINTMENT TOPICAL at 15:17

## 2025-01-01 RX ADMIN — ALPRAZOLAM 0.25 MG: 0.25 TABLET ORAL at 21:09

## 2025-01-01 RX ADMIN — DRONEDARONE 400 MG: 400 TABLET, FILM COATED ORAL at 16:27

## 2025-01-01 RX ADMIN — IPRATROPIUM BROMIDE AND ALBUTEROL SULFATE 1 DOSE: .5; 2.5 SOLUTION RESPIRATORY (INHALATION) at 03:47

## 2025-01-01 RX ADMIN — WATER 2000 MG: 1 INJECTION INTRAMUSCULAR; INTRAVENOUS; SUBCUTANEOUS at 00:40

## 2025-01-01 RX ADMIN — ACETAZOLAMIDE 250 MG: 250 TABLET ORAL at 08:03

## 2025-01-01 RX ADMIN — SERTRALINE 100 MG: 100 TABLET, FILM COATED ORAL at 20:51

## 2025-01-01 RX ADMIN — METHYLPREDNISOLONE SODIUM SUCCINATE 40 MG: 40 INJECTION INTRAMUSCULAR; INTRAVENOUS at 18:03

## 2025-01-01 RX ADMIN — ARFORMOTEROL TARTRATE 15 MCG: 15 SOLUTION RESPIRATORY (INHALATION) at 20:47

## 2025-01-01 RX ADMIN — ARIPIPRAZOLE 5 MG: 5 TABLET ORAL at 21:11

## 2025-01-01 RX ADMIN — SODIUM CHLORIDE 40 MG: 9 INJECTION INTRAMUSCULAR; INTRAVENOUS; SUBCUTANEOUS at 08:53

## 2025-01-01 RX ADMIN — DRONEDARONE 400 MG: 400 TABLET, FILM COATED ORAL at 17:11

## 2025-01-01 RX ADMIN — ARIPIPRAZOLE 5 MG: 5 TABLET ORAL at 20:42

## 2025-01-01 RX ADMIN — SODIUM CHLORIDE, PRESERVATIVE FREE 10 ML: 5 INJECTION INTRAVENOUS at 20:00

## 2025-01-01 RX ADMIN — DRONEDARONE 400 MG: 400 TABLET, FILM COATED ORAL at 08:03

## 2025-01-01 RX ADMIN — DRONEDARONE 400 MG: 400 TABLET, FILM COATED ORAL at 08:12

## 2025-01-01 RX ADMIN — ARIPIPRAZOLE 5 MG: 5 TABLET ORAL at 21:09

## 2025-01-01 RX ADMIN — PANTOPRAZOLE SODIUM 40 MG: 40 TABLET, DELAYED RELEASE ORAL at 17:11

## 2025-01-01 RX ADMIN — SODIUM CHLORIDE 40 MG: 9 INJECTION INTRAMUSCULAR; INTRAVENOUS; SUBCUTANEOUS at 10:49

## 2025-01-01 RX ADMIN — IPRATROPIUM BROMIDE AND ALBUTEROL SULFATE 1 DOSE: .5; 2.5 SOLUTION RESPIRATORY (INHALATION) at 12:04

## 2025-01-01 RX ADMIN — SODIUM CHLORIDE 40 MG: 9 INJECTION INTRAMUSCULAR; INTRAVENOUS; SUBCUTANEOUS at 08:03

## 2025-01-01 RX ADMIN — METOPROLOL TARTRATE 25 MG: 25 TABLET, FILM COATED ORAL at 20:49

## 2025-01-01 RX ADMIN — BUDESONIDE INHALATION 500 MCG: 0.5 SUSPENSION RESPIRATORY (INHALATION) at 20:19

## 2025-01-01 RX ADMIN — DILTIAZEM HYDROCHLORIDE 180 MG: 180 CAPSULE, COATED, EXTENDED RELEASE ORAL at 08:12

## 2025-01-01 RX ADMIN — IPRATROPIUM BROMIDE AND ALBUTEROL SULFATE 1 DOSE: .5; 2.5 SOLUTION RESPIRATORY (INHALATION) at 00:44

## 2025-01-01 RX ADMIN — METOPROLOL TARTRATE 25 MG: 25 TABLET, FILM COATED ORAL at 07:58

## 2025-01-01 RX ADMIN — ARFORMOTEROL TARTRATE 15 MCG: 15 SOLUTION RESPIRATORY (INHALATION) at 21:01

## 2025-01-01 RX ADMIN — ARFORMOTEROL TARTRATE 15 MCG: 15 SOLUTION RESPIRATORY (INHALATION) at 09:08

## 2025-01-01 RX ADMIN — IPRATROPIUM BROMIDE AND ALBUTEROL SULFATE 1 DOSE: .5; 2.5 SOLUTION RESPIRATORY (INHALATION) at 20:35

## 2025-01-01 RX ADMIN — METOPROLOL TARTRATE 25 MG: 25 TABLET, FILM COATED ORAL at 22:23

## 2025-01-01 RX ADMIN — DILTIAZEM HYDROCHLORIDE 180 MG: 180 CAPSULE, COATED, EXTENDED RELEASE ORAL at 20:40

## 2025-01-01 RX ADMIN — SODIUM CHLORIDE, PRESERVATIVE FREE 10 ML: 5 INJECTION INTRAVENOUS at 08:02

## 2025-01-01 RX ADMIN — CETIRIZINE HYDROCHLORIDE 10 MG: 10 TABLET, FILM COATED ORAL at 23:55

## 2025-01-01 RX ADMIN — SERTRALINE 100 MG: 100 TABLET, FILM COATED ORAL at 20:01

## 2025-01-01 RX ADMIN — WATER 2000 MG: 1 INJECTION INTRAMUSCULAR; INTRAVENOUS; SUBCUTANEOUS at 11:26

## 2025-01-01 RX ADMIN — AMLODIPINE BESYLATE 5 MG: 5 TABLET ORAL at 10:49

## 2025-01-01 RX ADMIN — METOPROLOL TARTRATE 25 MG: 25 TABLET, FILM COATED ORAL at 08:12

## 2025-01-01 RX ADMIN — SODIUM CHLORIDE 500 ML: 0.9 INJECTION, SOLUTION INTRAVENOUS at 08:49

## 2025-01-01 RX ADMIN — IPRATROPIUM BROMIDE AND ALBUTEROL SULFATE 1 DOSE: .5; 2.5 SOLUTION RESPIRATORY (INHALATION) at 09:04

## 2025-01-01 RX ADMIN — METOPROLOL TARTRATE 25 MG: 25 TABLET, FILM COATED ORAL at 08:53

## 2025-01-01 RX ADMIN — METHYLPREDNISOLONE SODIUM SUCCINATE 40 MG: 40 INJECTION, POWDER, LYOPHILIZED, FOR SOLUTION INTRAMUSCULAR; INTRAVENOUS at 22:23

## 2025-01-01 RX ADMIN — BUDESONIDE INHALATION 500 MCG: 0.5 SUSPENSION RESPIRATORY (INHALATION) at 09:59

## 2025-01-01 RX ADMIN — DRONEDARONE 400 MG: 400 TABLET, FILM COATED ORAL at 17:44

## 2025-01-01 RX ADMIN — AMLODIPINE BESYLATE 5 MG: 5 TABLET ORAL at 08:03

## 2025-01-01 RX ADMIN — IPRATROPIUM BROMIDE AND ALBUTEROL SULFATE 1 DOSE: .5; 2.5 SOLUTION RESPIRATORY (INHALATION) at 09:23

## 2025-01-01 RX ADMIN — SODIUM CHLORIDE, PRESERVATIVE FREE 10 ML: 5 INJECTION INTRAVENOUS at 10:51

## 2025-01-01 RX ADMIN — ALPRAZOLAM 0.25 MG: 0.25 TABLET ORAL at 00:48

## 2025-01-01 RX ADMIN — WATER 2000 MG: 1 INJECTION INTRAMUSCULAR; INTRAVENOUS; SUBCUTANEOUS at 23:58

## 2025-01-01 RX ADMIN — DILTIAZEM HYDROCHLORIDE 180 MG: 180 CAPSULE, COATED, EXTENDED RELEASE ORAL at 08:40

## 2025-01-01 RX ADMIN — IPRATROPIUM BROMIDE AND ALBUTEROL SULFATE 1 DOSE: .5; 2.5 SOLUTION RESPIRATORY (INHALATION) at 16:36

## 2025-01-01 RX ADMIN — DRONEDARONE 400 MG: 400 TABLET, FILM COATED ORAL at 10:49

## 2025-01-01 RX ADMIN — IPRATROPIUM BROMIDE AND ALBUTEROL SULFATE 1 DOSE: .5; 3 SOLUTION RESPIRATORY (INHALATION) at 21:02

## 2025-01-01 RX ADMIN — AMLODIPINE BESYLATE 5 MG: 5 TABLET ORAL at 07:59

## 2025-01-01 RX ADMIN — IPRATROPIUM BROMIDE AND ALBUTEROL SULFATE 1 DOSE: .5; 2.5 SOLUTION RESPIRATORY (INHALATION) at 00:06

## 2025-01-01 RX ADMIN — IPRATROPIUM BROMIDE AND ALBUTEROL SULFATE 1 DOSE: .5; 2.5 SOLUTION RESPIRATORY (INHALATION) at 09:17

## 2025-01-01 RX ADMIN — CETIRIZINE HYDROCHLORIDE 10 MG: 10 TABLET, FILM COATED ORAL at 08:12

## 2025-01-01 RX ADMIN — SODIUM CHLORIDE 40 MG: 9 INJECTION INTRAMUSCULAR; INTRAVENOUS; SUBCUTANEOUS at 07:57

## 2025-01-01 RX ADMIN — IPRATROPIUM BROMIDE AND ALBUTEROL SULFATE 1 DOSE: .5; 2.5 SOLUTION RESPIRATORY (INHALATION) at 09:59

## 2025-01-01 RX ADMIN — DILTIAZEM HYDROCHLORIDE 180 MG: 180 CAPSULE, COATED, EXTENDED RELEASE ORAL at 08:03

## 2025-01-01 RX ADMIN — METHYLPREDNISOLONE SODIUM SUCCINATE 40 MG: 40 INJECTION, POWDER, LYOPHILIZED, FOR SOLUTION INTRAMUSCULAR; INTRAVENOUS at 16:05

## 2025-01-01 RX ADMIN — DILTIAZEM HYDROCHLORIDE 180 MG: 180 CAPSULE, COATED, EXTENDED RELEASE ORAL at 10:49

## 2025-01-01 RX ADMIN — DILTIAZEM HYDROCHLORIDE 180 MG: 180 CAPSULE, COATED, EXTENDED RELEASE ORAL at 20:50

## 2025-01-01 RX ADMIN — ARIPIPRAZOLE 5 MG: 5 TABLET ORAL at 21:00

## 2025-01-01 RX ADMIN — METOPROLOL TARTRATE 25 MG: 25 TABLET, FILM COATED ORAL at 20:01

## 2025-01-01 RX ADMIN — METOPROLOL TARTRATE 25 MG: 25 TABLET, FILM COATED ORAL at 10:49

## 2025-01-01 RX ADMIN — LINEZOLID 600 MG: 600 TABLET, FILM COATED ORAL at 19:46

## 2025-01-01 RX ADMIN — SODIUM CHLORIDE, PRESERVATIVE FREE 10 ML: 5 INJECTION INTRAVENOUS at 20:02

## 2025-01-01 RX ADMIN — PETROLATUM: 420 OINTMENT TOPICAL at 08:12

## 2025-01-01 RX ADMIN — CETIRIZINE HYDROCHLORIDE 10 MG: 10 TABLET, FILM COATED ORAL at 08:10

## 2025-01-01 RX ADMIN — LINEZOLID 600 MG: 600 TABLET, FILM COATED ORAL at 10:50

## 2025-01-01 RX ADMIN — LINEZOLID 600 MG: 600 TABLET, FILM COATED ORAL at 08:10

## 2025-01-01 RX ADMIN — BUDESONIDE INHALATION 500 MCG: 0.5 SUSPENSION RESPIRATORY (INHALATION) at 08:59

## 2025-01-01 RX ADMIN — LINEZOLID 600 MG: 600 TABLET, FILM COATED ORAL at 07:57

## 2025-01-01 RX ADMIN — SODIUM CHLORIDE, PRESERVATIVE FREE 10 ML: 5 INJECTION INTRAVENOUS at 00:03

## 2025-01-01 RX ADMIN — IPRATROPIUM BROMIDE AND ALBUTEROL SULFATE 3 DOSE: 2.5; .5 SOLUTION RESPIRATORY (INHALATION) at 14:17

## 2025-01-01 RX ADMIN — AMLODIPINE BESYLATE 5 MG: 5 TABLET ORAL at 08:07

## 2025-01-01 RX ADMIN — SODIUM CHLORIDE, PRESERVATIVE FREE 10 ML: 5 INJECTION INTRAVENOUS at 20:50

## 2025-01-01 RX ADMIN — PETROLATUM: 420 OINTMENT TOPICAL at 17:47

## 2025-01-01 RX ADMIN — PANTOPRAZOLE SODIUM 40 MG: 40 TABLET, DELAYED RELEASE ORAL at 05:40

## 2025-01-01 RX ADMIN — PETROLATUM: 420 OINTMENT TOPICAL at 16:06

## 2025-01-01 RX ADMIN — METHYLPREDNISOLONE SODIUM SUCCINATE 40 MG: 40 INJECTION, POWDER, LYOPHILIZED, FOR SOLUTION INTRAMUSCULAR; INTRAVENOUS at 00:05

## 2025-01-01 RX ADMIN — IPRATROPIUM BROMIDE AND ALBUTEROL SULFATE 1 DOSE: .5; 2.5 SOLUTION RESPIRATORY (INHALATION) at 19:20

## 2025-01-01 RX ADMIN — METOPROLOL TARTRATE 25 MG: 25 TABLET, FILM COATED ORAL at 08:40

## 2025-01-01 RX ADMIN — BUDESONIDE INHALATION 500 MCG: 0.5 SUSPENSION RESPIRATORY (INHALATION) at 09:23

## 2025-01-01 RX ADMIN — ARFORMOTEROL TARTRATE 15 MCG: 15 SOLUTION RESPIRATORY (INHALATION) at 20:36

## 2025-01-01 RX ADMIN — IPRATROPIUM BROMIDE AND ALBUTEROL SULFATE 1 DOSE: .5; 2.5 SOLUTION RESPIRATORY (INHALATION) at 17:10

## 2025-01-01 RX ADMIN — SODIUM CHLORIDE, PRESERVATIVE FREE 10 ML: 5 INJECTION INTRAVENOUS at 21:15

## 2025-01-01 RX ADMIN — DRONEDARONE 400 MG: 400 TABLET, FILM COATED ORAL at 07:57

## 2025-01-01 RX ADMIN — IPRATROPIUM BROMIDE AND ALBUTEROL SULFATE 1 DOSE: .5; 2.5 SOLUTION RESPIRATORY (INHALATION) at 03:54

## 2025-01-01 RX ADMIN — PREDNISONE 20 MG: 20 TABLET ORAL at 08:53

## 2025-01-01 RX ADMIN — ARFORMOTEROL TARTRATE 15 MCG: 15 SOLUTION RESPIRATORY (INHALATION) at 21:21

## 2025-01-01 RX ADMIN — IPRATROPIUM BROMIDE AND ALBUTEROL SULFATE 1 DOSE: .5; 2.5 SOLUTION RESPIRATORY (INHALATION) at 12:42

## 2025-01-01 RX ADMIN — IPRATROPIUM BROMIDE AND ALBUTEROL SULFATE 1 DOSE: .5; 2.5 SOLUTION RESPIRATORY (INHALATION) at 16:02

## 2025-01-01 RX ADMIN — CETIRIZINE HYDROCHLORIDE 10 MG: 10 TABLET, FILM COATED ORAL at 07:58

## 2025-01-01 RX ADMIN — ARIPIPRAZOLE 5 MG: 5 TABLET ORAL at 20:51

## 2025-01-01 RX ADMIN — DILTIAZEM HYDROCHLORIDE 180 MG: 180 CAPSULE, COATED, EXTENDED RELEASE ORAL at 08:53

## 2025-01-01 RX ADMIN — ARIPIPRAZOLE 5 MG: 5 TABLET ORAL at 20:40

## 2025-01-01 RX ADMIN — ARFORMOTEROL TARTRATE 15 MCG: 15 SOLUTION RESPIRATORY (INHALATION) at 20:19

## 2025-01-01 RX ADMIN — SODIUM CHLORIDE, PRESERVATIVE FREE 10 ML: 5 INJECTION INTRAVENOUS at 08:14

## 2025-01-01 RX ADMIN — SODIUM CHLORIDE, PRESERVATIVE FREE 10 ML: 5 INJECTION INTRAVENOUS at 21:12

## 2025-01-01 RX ADMIN — CETIRIZINE HYDROCHLORIDE 10 MG: 10 TABLET, FILM COATED ORAL at 08:53

## 2025-01-01 RX ADMIN — CETIRIZINE HYDROCHLORIDE 10 MG: 10 TABLET, FILM COATED ORAL at 07:57

## 2025-01-01 RX ADMIN — BUDESONIDE INHALATION 500 MCG: 0.5 SUSPENSION RESPIRATORY (INHALATION) at 20:47

## 2025-01-01 RX ADMIN — ARFORMOTEROL TARTRATE 15 MCG: 15 SOLUTION RESPIRATORY (INHALATION) at 08:33

## 2025-01-01 RX ADMIN — SERTRALINE 100 MG: 100 TABLET, FILM COATED ORAL at 22:23

## 2025-01-01 RX ADMIN — IPRATROPIUM BROMIDE AND ALBUTEROL SULFATE 1 DOSE: .5; 2.5 SOLUTION RESPIRATORY (INHALATION) at 08:59

## 2025-01-01 RX ADMIN — ARFORMOTEROL TARTRATE 15 MCG: 15 SOLUTION RESPIRATORY (INHALATION) at 20:32

## 2025-01-01 RX ADMIN — METHYLPREDNISOLONE SODIUM SUCCINATE 40 MG: 40 INJECTION, POWDER, LYOPHILIZED, FOR SOLUTION INTRAMUSCULAR; INTRAVENOUS at 05:42

## 2025-01-01 RX ADMIN — METOPROLOL TARTRATE 25 MG: 25 TABLET, FILM COATED ORAL at 08:03

## 2025-01-01 RX ADMIN — DILTIAZEM HYDROCHLORIDE 180 MG: 180 CAPSULE, COATED, EXTENDED RELEASE ORAL at 19:46

## 2025-01-01 RX ADMIN — POLYETHYLENE GLYCOL 3350 238 G: 17 POWDER, FOR SOLUTION ORAL at 08:37

## 2025-01-01 RX ADMIN — SODIUM CHLORIDE, PRESERVATIVE FREE 10 ML: 5 INJECTION INTRAVENOUS at 08:46

## 2025-01-01 RX ADMIN — DRONEDARONE 400 MG: 400 TABLET, FILM COATED ORAL at 08:10

## 2025-01-01 RX ADMIN — WATER 2000 MG: 1 INJECTION INTRAMUSCULAR; INTRAVENOUS; SUBCUTANEOUS at 08:03

## 2025-01-01 RX ADMIN — BUDESONIDE INHALATION 500 MCG: 0.5 SUSPENSION RESPIRATORY (INHALATION) at 09:08

## 2025-01-01 RX ADMIN — SODIUM CHLORIDE 1000 ML: 9 INJECTION, SOLUTION INTRAVENOUS at 15:04

## 2025-01-01 RX ADMIN — DRONEDARONE 400 MG: 400 TABLET, FILM COATED ORAL at 07:58

## 2025-01-01 RX ADMIN — PANTOPRAZOLE SODIUM 40 MG: 40 TABLET, DELAYED RELEASE ORAL at 16:07

## 2025-01-01 RX ADMIN — IPRATROPIUM BROMIDE AND ALBUTEROL SULFATE 1 DOSE: .5; 2.5 SOLUTION RESPIRATORY (INHALATION) at 05:15

## 2025-01-01 RX ADMIN — METOPROLOL TARTRATE 25 MG: 25 TABLET, FILM COATED ORAL at 21:08

## 2025-01-01 RX ADMIN — DRONEDARONE 400 MG: 400 TABLET, FILM COATED ORAL at 08:40

## 2025-01-01 RX ADMIN — ARFORMOTEROL TARTRATE 15 MCG: 15 SOLUTION RESPIRATORY (INHALATION) at 09:59

## 2025-01-01 RX ADMIN — DILTIAZEM HYDROCHLORIDE 180 MG: 180 CAPSULE, COATED, EXTENDED RELEASE ORAL at 20:51

## 2025-01-01 RX ADMIN — DILTIAZEM HYDROCHLORIDE 180 MG: 180 CAPSULE, COATED, EXTENDED RELEASE ORAL at 08:10

## 2025-01-01 RX ADMIN — LINEZOLID 600 MG: 600 TABLET, FILM COATED ORAL at 20:49

## 2025-01-01 RX ADMIN — BUDESONIDE INHALATION 500 MCG: 0.5 SUSPENSION RESPIRATORY (INHALATION) at 20:32

## 2025-01-01 RX ADMIN — SODIUM CHLORIDE, PRESERVATIVE FREE 10 ML: 5 INJECTION INTRAVENOUS at 08:12

## 2025-01-01 RX ADMIN — SERTRALINE 100 MG: 100 TABLET, FILM COATED ORAL at 20:49

## 2025-01-01 RX ADMIN — METOPROLOL TARTRATE 25 MG: 25 TABLET, FILM COATED ORAL at 20:47

## 2025-01-01 RX ADMIN — ARIPIPRAZOLE 5 MG: 5 TABLET ORAL at 19:48

## 2025-01-01 RX ADMIN — DRONEDARONE 400 MG: 400 TABLET, FILM COATED ORAL at 18:05

## 2025-01-01 RX ADMIN — BUDESONIDE INHALATION 500 MCG: 0.5 SUSPENSION RESPIRATORY (INHALATION) at 21:21

## 2025-01-01 RX ADMIN — IPRATROPIUM BROMIDE AND ALBUTEROL SULFATE 1 DOSE: .5; 2.5 SOLUTION RESPIRATORY (INHALATION) at 23:50

## 2025-01-01 RX ADMIN — BUDESONIDE INHALATION 500 MCG: 0.5 SUSPENSION RESPIRATORY (INHALATION) at 20:02

## 2025-01-01 RX ADMIN — IPRATROPIUM BROMIDE AND ALBUTEROL SULFATE 1 DOSE: .5; 2.5 SOLUTION RESPIRATORY (INHALATION) at 13:07

## 2025-01-01 RX ADMIN — SODIUM CHLORIDE, PRESERVATIVE FREE 10 ML: 5 INJECTION INTRAVENOUS at 08:53

## 2025-01-01 RX ADMIN — PREDNISONE 40 MG: 20 TABLET ORAL at 10:49

## 2025-01-01 RX ADMIN — DRONEDARONE 400 MG: 400 TABLET, FILM COATED ORAL at 17:12

## 2025-01-01 RX ADMIN — LINEZOLID 600 MG: 600 TABLET, FILM COATED ORAL at 20:01

## 2025-01-01 RX ADMIN — METOPROLOL TARTRATE 25 MG: 25 TABLET, FILM COATED ORAL at 20:40

## 2025-01-01 RX ADMIN — DRONEDARONE 400 MG: 400 TABLET, FILM COATED ORAL at 08:53

## 2025-01-01 RX ADMIN — WATER 2000 MG: 1 INJECTION INTRAMUSCULAR; INTRAVENOUS; SUBCUTANEOUS at 11:56

## 2025-01-01 RX ADMIN — METOPROLOL TARTRATE 25 MG: 25 TABLET, FILM COATED ORAL at 19:46

## 2025-01-01 RX ADMIN — IPRATROPIUM BROMIDE AND ALBUTEROL SULFATE 1 DOSE: .5; 2.5 SOLUTION RESPIRATORY (INHALATION) at 12:40

## 2025-01-01 RX ADMIN — METOPROLOL TARTRATE 25 MG: 25 TABLET, FILM COATED ORAL at 07:57

## 2025-01-01 RX ADMIN — CETIRIZINE HYDROCHLORIDE 10 MG: 10 TABLET, FILM COATED ORAL at 08:03

## 2025-01-01 RX ADMIN — DRONEDARONE 400 MG: 400 TABLET, FILM COATED ORAL at 16:07

## 2025-01-01 RX ADMIN — SODIUM CHLORIDE 40 MG: 9 INJECTION INTRAMUSCULAR; INTRAVENOUS; SUBCUTANEOUS at 07:59

## 2025-01-01 RX ADMIN — IPRATROPIUM BROMIDE AND ALBUTEROL SULFATE 1 DOSE: .5; 2.5 SOLUTION RESPIRATORY (INHALATION) at 00:25

## 2025-01-01 ASSESSMENT — PAIN - FUNCTIONAL ASSESSMENT: PAIN_FUNCTIONAL_ASSESSMENT: 0-10

## 2025-01-09 DIAGNOSIS — F41.9 ANXIETY: ICD-10-CM

## 2025-01-09 NOTE — TELEPHONE ENCOUNTER
George calling for a new script for Sertraline 50mg to be sent to the [facility] in [facility].       I have this ready to send for another 6 months.      Last Appointment:  10/29/2024  Future Appointments   Date Time Provider Department Center   3/4/2025 11:00 AM David Coffey MD [facility] DE[facility]   3/10/2025 10:20 AM Renetta Manzano, DO [facility]

## 2025-01-23 ENCOUNTER — TELEPHONE (OUTPATIENT)
Dept: FAMILY MEDICINE CLINIC | Age: 85
End: 2025-01-23

## 2025-01-23 RX ORDER — PANTOPRAZOLE SODIUM 40 MG/1
40 TABLET, DELAYED RELEASE ORAL DAILY
Qty: 90 TABLET | Refills: 1 | Status: SHIPPED | OUTPATIENT
Start: 2025-01-23

## 2025-01-30 ENCOUNTER — TELEPHONE (OUTPATIENT)
Dept: FAMILY MEDICINE CLINIC | Age: 85
End: 2025-01-30

## 2025-01-30 DIAGNOSIS — R00.0 TACHYCARDIA: Primary | ICD-10-CM

## 2025-01-30 NOTE — TELEPHONE ENCOUNTER
George -        George calling to ask for referrals to see 2 different specialists.     Upon discharge from the hospital, she was told that she will need to see a cardiologist and a gastroenterologist.     He is calling to ask for those referrals.        Priya does have a follow up with you on Feb 11th.

## 2025-01-31 NOTE — TELEPHONE ENCOUNTER
Cardiology referral placed.  We will discuss if seeing a referral for her stomach when she is here in office.

## 2025-01-31 NOTE — TELEPHONE ENCOUNTER
Patient's  George called back the office and said patient was in [facility] in [place], [place]. She was admitted 01/24/2025 and discharged 01/28/2025.     Patient went into the hospital for stomach pain and tachycardia. Hospital did an upper GI and found a bleed and two polyps. They cauterized the internal bleed and removed the polyps.   They did a cardiac work up on patient for the tachycardia and recommended a referral for a cardiologist when she came back to [place].     is currently see Dr. Abernathy and would like the patient to see him as well. Dr. Coffey would you be agreeable to this referral now or wait until the hospital f/u appointment?     Patient is coming home on 02/07/2025 and has a follow up appointment on 02/11/2025.   Patient was put on 3 new medications, but nor  or wife could tell me what they were at this time.     I sent a records request form to the hospital medical records department for this information.         Call patient once medical records received and if Dr. Coffey will place the referral for cardiology.

## 2025-01-31 NOTE — TELEPHONE ENCOUNTER
Left voicemail for  George to return call. Need to know what hospital Bertha was in/and why she was in hosp.   Records need requested

## 2025-02-03 DIAGNOSIS — J44.9 CHRONIC OBSTRUCTIVE PULMONARY DISEASE, UNSPECIFIED COPD TYPE (HCC): Primary | ICD-10-CM

## 2025-02-03 RX ORDER — FLUTICASONE FUROATE, UMECLIDINIUM BROMIDE AND VILANTEROL TRIFENATATE 200; 62.5; 25 UG/1; UG/1; UG/1
1 POWDER RESPIRATORY (INHALATION) DAILY
Qty: 3 EACH | Refills: 3 | Status: SHIPPED | OUTPATIENT
Start: 2025-02-03

## 2025-02-03 NOTE — PROGRESS NOTES
Refill for Trelegy inhaler sent to local [place] pharmacy per 's requests. They are returning form [place] this Friday and pt will need new Rx. Per orders script was sent.

## 2025-02-10 ENCOUNTER — TELEPHONE (OUTPATIENT)
Dept: FAMILY MEDICINE CLINIC | Age: 85
End: 2025-02-10

## 2025-02-10 NOTE — TELEPHONE ENCOUNTER
YASMINE calling and wants to know if you could call her to speak about parents. She is noting a decline in both of them. Oldest son is now staying with him here in [place].   She is not on the HIPAA form. She reports medication concerns. She has multiple safety concerns. Mom is depressed.   YASMINE is flowing back to [place] tonight.

## 2025-02-11 ENCOUNTER — OFFICE VISIT (OUTPATIENT)
Dept: FAMILY MEDICINE CLINIC | Age: 85
End: 2025-02-11

## 2025-02-11 VITALS
DIASTOLIC BLOOD PRESSURE: 82 MMHG | OXYGEN SATURATION: 76 % | HEART RATE: 78 BPM | SYSTOLIC BLOOD PRESSURE: 136 MMHG | TEMPERATURE: 97.6 F

## 2025-02-11 DIAGNOSIS — I10 PRIMARY HYPERTENSION: ICD-10-CM

## 2025-02-11 DIAGNOSIS — J44.9 CHRONIC OBSTRUCTIVE PULMONARY DISEASE, UNSPECIFIED COPD TYPE (HCC): ICD-10-CM

## 2025-02-11 DIAGNOSIS — Z23 NEED FOR INFLUENZA VACCINATION: Primary | ICD-10-CM

## 2025-02-11 DIAGNOSIS — J96.11 CHRONIC RESPIRATORY FAILURE WITH HYPOXIA: ICD-10-CM

## 2025-02-11 DIAGNOSIS — I48.91 NEW ONSET A-FIB (HCC): ICD-10-CM

## 2025-02-11 DIAGNOSIS — Z89.512 ACQUIRED ABSENCE OF LEFT LEG BELOW KNEE (HCC): ICD-10-CM

## 2025-02-11 RX ORDER — METOPROLOL TARTRATE 25 MG/1
25 TABLET, FILM COATED ORAL 2 TIMES DAILY
Qty: 90 TABLET | Refills: 3
Start: 2025-02-11

## 2025-02-11 RX ORDER — DILTIAZEM HYDROCHLORIDE 180 MG/1
180 CAPSULE, COATED, EXTENDED RELEASE ORAL 2 TIMES DAILY
COMMUNITY

## 2025-02-11 RX ORDER — ARIPIPRAZOLE 5 MG/1
5 TABLET ORAL NIGHTLY
Qty: 30 TABLET | Refills: 1 | Status: SHIPPED | OUTPATIENT
Start: 2025-02-11

## 2025-02-11 SDOH — ECONOMIC STABILITY: FOOD INSECURITY: WITHIN THE PAST 12 MONTHS, YOU WORRIED THAT YOUR FOOD WOULD RUN OUT BEFORE YOU GOT MONEY TO BUY MORE.: NEVER TRUE

## 2025-02-11 SDOH — ECONOMIC STABILITY: FOOD INSECURITY: WITHIN THE PAST 12 MONTHS, THE FOOD YOU BOUGHT JUST DIDN'T LAST AND YOU DIDN'T HAVE MONEY TO GET MORE.: NEVER TRUE

## 2025-02-11 ASSESSMENT — ENCOUNTER SYMPTOMS
VOMITING: 0
RHINORRHEA: 0
COUGH: 0
NAUSEA: 0
ABDOMINAL PAIN: 0
SHORTNESS OF BREATH: 0

## 2025-02-11 ASSESSMENT — PATIENT HEALTH QUESTIONNAIRE - PHQ9
5. POOR APPETITE OR OVEREATING: SEVERAL DAYS
SUM OF ALL RESPONSES TO PHQ QUESTIONS 1-9: 10
3. TROUBLE FALLING OR STAYING ASLEEP: MORE THAN HALF THE DAYS
8. MOVING OR SPEAKING SO SLOWLY THAT OTHER PEOPLE COULD HAVE NOTICED. OR THE OPPOSITE, BEING SO FIGETY OR RESTLESS THAT YOU HAVE BEEN MOVING AROUND A LOT MORE THAN USUAL: NOT AT ALL
SUM OF ALL RESPONSES TO PHQ QUESTIONS 1-9: 10
7. TROUBLE CONCENTRATING ON THINGS, SUCH AS READING THE NEWSPAPER OR WATCHING TELEVISION: NOT AT ALL
10. IF YOU CHECKED OFF ANY PROBLEMS, HOW DIFFICULT HAVE THESE PROBLEMS MADE IT FOR YOU TO DO YOUR WORK, TAKE CARE OF THINGS AT HOME, OR GET ALONG WITH OTHER PEOPLE: SOMEWHAT DIFFICULT
SUM OF ALL RESPONSES TO PHQ9 QUESTIONS 1 & 2: 2
9. THOUGHTS THAT YOU WOULD BE BETTER OFF DEAD, OR OF HURTING YOURSELF: NOT AT ALL
1. LITTLE INTEREST OR PLEASURE IN DOING THINGS: SEVERAL DAYS
SUM OF ALL RESPONSES TO PHQ QUESTIONS 1-9: 10
2. FEELING DOWN, DEPRESSED OR HOPELESS: SEVERAL DAYS
6. FEELING BAD ABOUT YOURSELF - OR THAT YOU ARE A FAILURE OR HAVE LET YOURSELF OR YOUR FAMILY DOWN: MORE THAN HALF THE DAYS
SUM OF ALL RESPONSES TO PHQ QUESTIONS 1-9: 10
4. FEELING TIRED OR HAVING LITTLE ENERGY: NEARLY EVERY DAY

## 2025-02-11 NOTE — PROGRESS NOTES
tablet by mouth 2 times daily (with meals)     • ARIPiprazole (ABILIFY) 5 MG tablet Take 1 tablet by mouth at bedtime 30 tablet 1   • metoprolol tartrate (LOPRESSOR) 25 MG tablet Take 1 tablet by mouth 2 times daily 90 tablet 3   • pantoprazole (PROTONIX) 40 MG tablet Take 1 tablet by mouth daily 90 tablet 1   • ALPRAZolam (XANAX) 0.25 MG tablet TAKE 1 TAB BY MOUTH NIGHTLY. MAY TAKE AN ADDITIONAL TAB AS NEEDED FOR LEAVING THE HOUSE 60 tablet 0   • senna (SENOKOT) 8.6 MG tablet Take 1 tablet by mouth 2 times daily If needed for constipation 60 tablet 11   • ipratropium 0.5 mg-albuterol 2.5 mg (DUONEB) 0.5-2.5 (3) MG/3ML SOLN nebulizer solution Inhale 3 mLs into the lungs 3 times daily 360 mL 12   • losartan (COZAAR) 50 MG tablet TAKE ONE TABLET BY MOUTH ONCE DAILY 90 tablet 3   • albuterol sulfate HFA (VENTOLIN HFA) 108 (90 Base) MCG/ACT inhaler Inhale 2 puffs into the lungs 4 times daily as needed for Wheezing 3 each 1   • sertraline (ZOLOFT) 100 MG tablet Take 1 tablet by mouth nightly 90 tablet 3   • fluticasone-umeclidin-vilant (TRELEGY ELLIPTA) 200-62.5-25 MCG/ACT AEPB inhaler Inhale 1 puff into the lungs daily 3 each 3   • amLODIPine (NORVASC) 5 MG tablet Take 1 tablet by mouth daily 90 tablet 3   • cetirizine (ZYRTEC) 10 MG tablet Take 1 tablet by mouth daily     • ibuprofen (ADVIL;MOTRIN) 200 MG tablet Take 1 tablet by mouth every 6 hours as needed for Pain     • aspirin 81 MG chewable tablet Take 1 tablet by mouth daily     • Omega-3 Fatty Acids (FISH OIL PO) Take by mouth daily     • Cholecalciferol (VITAMIN D) 50 MCG (2000 UT) CAPS capsule Take by mouth daily     • OXYGEN Inhale into the lungs 6 LITERS     • ferrous sulfate (IRON 325) 325 (65 Fe) MG tablet Take 1 tablet by mouth Twice a Week     • acetaZOLAMIDE (DIAMOX) 250 MG tablet Take 1 tablet by mouth daily 90 tablet 3   • ammonium lactate (LAC-HYDRIN) 12 % lotion Apply topically twice daily 400 g 2     No current facility-administered medications for

## 2025-02-11 NOTE — TELEPHONE ENCOUNTER
I spoke to Priya, letting her know that a referral was placed for her to see a Cardiologist.  And to let her know that Dr Coffey will discuss the referral for her stomach when he sees her on March 4th.

## 2025-02-13 ENCOUNTER — CARE COORDINATION (OUTPATIENT)
Dept: CARE COORDINATION | Age: 85
End: 2025-02-13

## 2025-02-14 NOTE — CARE COORDINATION
Ambulatory Care Coordination Note     2/14/2025 2:26 PM     Patient outreach attempt by this ACM today to perform care management follow up . ACM was unable to reach the patient, spouse/partner  by telephone today;   left voice message requesting a return phone call to this ACM.     ACM: Richard Long RN     Care Summary Note: n/a    PCP/Specialist follow up:   Future Appointments         Provider Specialty Dept Phone    3/4/2025 11:00 AM David Coffey MD Family Medicine [phone]    3/10/2025 10:20 AM Renetta Manzano DO Pulmonology [phone]    3/13/2025 2:00 PM Leonard Abernathy MD Cardiology [phone]            Follow Up:   Plan for next ACM outreach in approximately 1 week to complete:  - medication review.

## 2025-02-17 ENCOUNTER — APPOINTMENT (OUTPATIENT)
Dept: GENERAL RADIOLOGY | Age: 85
DRG: 189 | End: 2025-02-17
Payer: MEDICARE

## 2025-02-17 ENCOUNTER — APPOINTMENT (OUTPATIENT)
Dept: CT IMAGING | Age: 85
DRG: 189 | End: 2025-02-17
Payer: MEDICARE

## 2025-02-17 ENCOUNTER — HOSPITAL ENCOUNTER (INPATIENT)
Age: 85
LOS: 3 days | Discharge: NURSING, REHAB OR LONG-TERM CARE | DRG: 189 | End: 2025-02-20
Attending: STUDENT IN AN ORGANIZED HEALTH CARE EDUCATION/TRAINING PROGRAM | Admitting: FAMILY MEDICINE
Payer: MEDICARE

## 2025-02-17 DIAGNOSIS — J96.22 ACUTE ON CHRONIC RESPIRATORY FAILURE WITH HYPOXIA AND HYPERCAPNIA (HCC): ICD-10-CM

## 2025-02-17 DIAGNOSIS — J96.21 ACUTE ON CHRONIC RESPIRATORY FAILURE WITH HYPOXIA AND HYPERCAPNIA (HCC): ICD-10-CM

## 2025-02-17 DIAGNOSIS — J44.1 COPD EXACERBATION (HCC): Primary | ICD-10-CM

## 2025-02-17 PROBLEM — D64.9 ANEMIA: Status: ACTIVE | Noted: 2025-02-17

## 2025-02-17 LAB
ALBUMIN SERPL-MCNC: 4.1 G/DL (ref 3.5–5.2)
ALP SERPL-CCNC: 69 U/L (ref 35–104)
ALT SERPL-CCNC: 17 U/L (ref 0–32)
ANION GAP SERPL CALCULATED.3IONS-SCNC: 7 MMOL/L (ref 7–16)
AST SERPL-CCNC: 22 U/L (ref 0–31)
B PARAP IS1001 DNA NPH QL NAA+NON-PROBE: NOT DETECTED
B PERT DNA SPEC QL NAA+PROBE: NOT DETECTED
B.E.: 11.4 MMOL/L (ref -3–3)
B.E.: 12 MMOL/L (ref -3–3)
B.E.: 12.2 MMOL/L (ref -3–3)
B.E.: 12.4 MMOL/L (ref -3–3)
B.E.: 13.4 MMOL/L (ref -3–3)
B.E.: 15.5 MMOL/L (ref -3–3)
BASOPHILS # BLD: 0.03 K/UL (ref 0–0.2)
BASOPHILS NFR BLD: 1 % (ref 0–2)
BILIRUB SERPL-MCNC: 0.3 MG/DL (ref 0–1.2)
BNP SERPL-MCNC: 818 PG/ML (ref 0–450)
BUN SERPL-MCNC: 14 MG/DL (ref 6–23)
C PNEUM DNA NPH QL NAA+NON-PROBE: NOT DETECTED
CALCIUM SERPL-MCNC: 9.2 MG/DL (ref 8.6–10.2)
CHLORIDE SERPL-SCNC: 97 MMOL/L (ref 98–107)
CO2 SERPL-SCNC: 39 MMOL/L (ref 22–29)
COHB: 0.6 % (ref 0–1.5)
COHB: 0.6 % (ref 0–1.5)
COHB: 0.7 % (ref 0–1.5)
COHB: 0.8 % (ref 0–1.5)
COHB: 0.9 % (ref 0–1.5)
COHB: 1 % (ref 0–1.5)
CREAT SERPL-MCNC: 0.8 MG/DL (ref 0.5–1)
CRITICAL: ABNORMAL
D-DIMER QUANTITATIVE: 379 NG/ML DDU (ref 0–230)
DATE ANALYZED: ABNORMAL
DATE OF COLLECTION: ABNORMAL
EOSINOPHIL # BLD: 0.07 K/UL (ref 0.05–0.5)
EOSINOPHILS RELATIVE PERCENT: 1 % (ref 0–6)
ERYTHROCYTE [DISTWIDTH] IN BLOOD BY AUTOMATED COUNT: 23.9 % (ref 11.5–15)
FIO2: 40 %
FIO2: 70 %
FLUAV RNA NPH QL NAA+NON-PROBE: NOT DETECTED
FLUBV RNA NPH QL NAA+NON-PROBE: NOT DETECTED
GFR, ESTIMATED: 71 ML/MIN/1.73M2
GLUCOSE SERPL-MCNC: 144 MG/DL (ref 74–99)
HADV DNA NPH QL NAA+NON-PROBE: NOT DETECTED
HCO3: 39.7 MMOL/L (ref 22–26)
HCO3: 40.1 MMOL/L (ref 22–26)
HCO3: 40.4 MMOL/L (ref 22–26)
HCO3: 40.6 MMOL/L (ref 22–26)
HCO3: 41.2 MMOL/L (ref 22–26)
HCO3: 44.6 MMOL/L (ref 22–26)
HCOV 229E RNA NPH QL NAA+NON-PROBE: NOT DETECTED
HCOV HKU1 RNA NPH QL NAA+NON-PROBE: NOT DETECTED
HCOV NL63 RNA NPH QL NAA+NON-PROBE: NOT DETECTED
HCOV OC43 RNA NPH QL NAA+NON-PROBE: NOT DETECTED
HCT VFR BLD AUTO: 33.6 % (ref 34–48)
HGB BLD-MCNC: 9.1 G/DL (ref 11.5–15.5)
HHB: 0 % (ref 0–5)
HHB: 0.2 % (ref 0–5)
HHB: 1.8 % (ref 0–5)
HHB: 3.1 % (ref 0–5)
HMPV RNA NPH QL NAA+NON-PROBE: NOT DETECTED
HPIV1 RNA NPH QL NAA+NON-PROBE: NOT DETECTED
HPIV2 RNA NPH QL NAA+NON-PROBE: NOT DETECTED
HPIV3 RNA NPH QL NAA+NON-PROBE: NOT DETECTED
HPIV4 RNA NPH QL NAA+NON-PROBE: NOT DETECTED
IMM GRANULOCYTES # BLD AUTO: <0.03 K/UL (ref 0–0.58)
IMM GRANULOCYTES NFR BLD: 0 % (ref 0–5)
INFLUENZA A BY PCR: NOT DETECTED
INFLUENZA B BY PCR: NOT DETECTED
LAB: ABNORMAL
LYMPHOCYTES NFR BLD: 1.91 K/UL (ref 1.5–4)
LYMPHOCYTES RELATIVE PERCENT: 30 % (ref 20–42)
Lab: 1139
Lab: 1355
Lab: 1615
Lab: 2150
Lab: 710
Lab: 930
M PNEUMO DNA NPH QL NAA+NON-PROBE: NOT DETECTED
MCH RBC QN AUTO: 26.6 PG (ref 26–35)
MCHC RBC AUTO-ENTMCNC: 27.1 G/DL (ref 32–34.5)
MCV RBC AUTO: 98.2 FL (ref 80–99.9)
METHB: 0.3 % (ref 0–1.5)
MODE: ABNORMAL
MONOCYTES NFR BLD: 0.45 K/UL (ref 0.1–0.95)
MONOCYTES NFR BLD: 7 % (ref 2–12)
NEUTROPHILS NFR BLD: 61 % (ref 43–80)
NEUTS SEG NFR BLD: 3.95 K/UL (ref 1.8–7.3)
O2 CONTENT: 12.9 ML/DL
O2 CONTENT: 13.4 ML/DL
O2 CONTENT: 13.6 ML/DL
O2 CONTENT: 13.6 ML/DL
O2 CONTENT: 14.2 ML/DL
O2 CONTENT: 14.8 ML/DL
O2 SATURATION: 100 % (ref 92–98.5)
O2 SATURATION: 96.9 % (ref 92–98.5)
O2 SATURATION: 98.2 % (ref 92–98.5)
O2 SATURATION: 99.8 % (ref 92–98.5)
O2HB: 96 % (ref 94–97)
O2HB: 97.1 % (ref 94–97)
O2HB: 98.7 % (ref 94–97)
O2HB: 98.8 % (ref 94–97)
O2HB: 98.9 % (ref 94–97)
O2HB: 99 % (ref 94–97)
OPERATOR ID: 316
OPERATOR ID: 420
OPERATOR ID: 46
OPERATOR ID: 46
OPERATOR ID: ABNORMAL
OPERATOR ID: ABNORMAL
PATIENT TEMP: 37 C
PCO2: 64.5 MMHG (ref 35–45)
PCO2: 69.2 MMHG (ref 35–45)
PCO2: 81.9 MMHG (ref 35–45)
PCO2: 86 MMHG (ref 35–45)
PCO2: 87 MMHG (ref 35–45)
PCO2: 92.9 MMHG (ref 35–45)
PEEP/CPAP: 8 CMH2O
PFO2: 1.48 MMHG/%
PFO2: 2.2 MMHG/%
PFO2: 3.38 MMHG/%
PFO2: 3.5 MMHG/%
PH BLOOD GAS: 7.29 (ref 7.35–7.45)
PH BLOOD GAS: 7.3 (ref 7.35–7.45)
PH BLOOD GAS: 7.3 (ref 7.35–7.45)
PH BLOOD GAS: 7.31 (ref 7.35–7.45)
PH BLOOD GAS: 7.38 (ref 7.35–7.45)
PH BLOOD GAS: 7.41 (ref 7.35–7.45)
PLATELET # BLD AUTO: 191 K/UL (ref 130–450)
PMV BLD AUTO: 9.1 FL (ref 7–12)
PO2: 103.7 MMHG (ref 75–100)
PO2: 217 MMHG (ref 75–100)
PO2: 236.5 MMHG (ref 75–100)
PO2: 244.8 MMHG (ref 75–100)
PO2: 252.4 MMHG (ref 75–100)
PO2: 87.9 MMHG (ref 75–100)
POTASSIUM SERPL-SCNC: 3.9 MMOL/L (ref 3.5–5)
PROT SERPL-MCNC: 7.1 G/DL (ref 6.4–8.3)
RBC # BLD AUTO: 3.42 M/UL (ref 3.5–5.5)
RBC # BLD: ABNORMAL 10*6/UL
RI(T): 0.68
RI(T): 0.72
RI(T): 1.34
RI(T): 3.14
RR MECHANICAL: 22 B/MIN
RR MECHANICAL: 26 B/MIN
RSV RNA NPH QL NAA+NON-PROBE: NOT DETECTED
RV+EV RNA NPH QL NAA+NON-PROBE: NOT DETECTED
SARS-COV-2 RDRP RESP QL NAA+PROBE: NOT DETECTED
SARS-COV-2 RNA NPH QL NAA+NON-PROBE: NOT DETECTED
SODIUM SERPL-SCNC: 143 MMOL/L (ref 132–146)
SOURCE, BLOOD GAS: ABNORMAL
SPECIMEN DESCRIPTION: NORMAL
SPECIMEN DESCRIPTION: NORMAL
THB: 10 G/DL (ref 11.5–16.5)
THB: 10.2 G/DL (ref 11.5–16.5)
THB: 8.9 G/DL (ref 11.5–16.5)
THB: 9.2 G/DL (ref 11.5–16.5)
THB: 9.8 G/DL (ref 11.5–16.5)
THB: 9.8 G/DL (ref 11.5–16.5)
TIME ANALYZED: 1144
TIME ANALYZED: 1404
TIME ANALYZED: 1619
TIME ANALYZED: 2202
TIME ANALYZED: 714
TIME ANALYZED: 936
TROPONIN I SERPL HS-MCNC: 19 NG/L (ref 0–9)
TROPONIN I SERPL HS-MCNC: 20 NG/L (ref 0–9)
VT MECHANICAL: 400 ML
WBC OTHER # BLD: 6.4 K/UL (ref 4.5–11.5)

## 2025-02-17 PROCEDURE — 0202U NFCT DS 22 TRGT SARS-COV-2: CPT

## 2025-02-17 PROCEDURE — 85379 FIBRIN DEGRADATION QUANT: CPT

## 2025-02-17 PROCEDURE — 6360000002 HC RX W HCPCS

## 2025-02-17 PROCEDURE — 83880 ASSAY OF NATRIURETIC PEPTIDE: CPT

## 2025-02-17 PROCEDURE — 99222 1ST HOSP IP/OBS MODERATE 55: CPT | Performed by: FAMILY MEDICINE

## 2025-02-17 PROCEDURE — 96365 THER/PROPH/DIAG IV INF INIT: CPT

## 2025-02-17 PROCEDURE — 6370000000 HC RX 637 (ALT 250 FOR IP)

## 2025-02-17 PROCEDURE — 80053 COMPREHEN METABOLIC PANEL: CPT

## 2025-02-17 PROCEDURE — 71275 CT ANGIOGRAPHY CHEST: CPT

## 2025-02-17 PROCEDURE — 96366 THER/PROPH/DIAG IV INF ADDON: CPT

## 2025-02-17 PROCEDURE — 71045 X-RAY EXAM CHEST 1 VIEW: CPT

## 2025-02-17 PROCEDURE — 87502 INFLUENZA DNA AMP PROBE: CPT

## 2025-02-17 PROCEDURE — 85025 COMPLETE CBC W/AUTO DIFF WBC: CPT

## 2025-02-17 PROCEDURE — 84484 ASSAY OF TROPONIN QUANT: CPT

## 2025-02-17 PROCEDURE — 99285 EMERGENCY DEPT VISIT HI MDM: CPT

## 2025-02-17 PROCEDURE — 94664 DEMO&/EVAL PT USE INHALER: CPT

## 2025-02-17 PROCEDURE — 5A09357 ASSISTANCE WITH RESPIRATORY VENTILATION, LESS THAN 24 CONSECUTIVE HOURS, CONTINUOUS POSITIVE AIRWAY PRESSURE: ICD-10-PCS | Performed by: INTERNAL MEDICINE

## 2025-02-17 PROCEDURE — 93005 ELECTROCARDIOGRAM TRACING: CPT

## 2025-02-17 PROCEDURE — 6360000004 HC RX CONTRAST MEDICATION: Performed by: RADIOLOGY

## 2025-02-17 PROCEDURE — 2580000003 HC RX 258

## 2025-02-17 PROCEDURE — 94660 CPAP INITIATION&MGMT: CPT

## 2025-02-17 PROCEDURE — 5A0945A ASSISTANCE WITH RESPIRATORY VENTILATION, 24-96 CONSECUTIVE HOURS, HIGH NASAL FLOW/VELOCITY: ICD-10-PCS | Performed by: INTERNAL MEDICINE

## 2025-02-17 PROCEDURE — 82805 BLOOD GASES W/O2 SATURATION: CPT

## 2025-02-17 PROCEDURE — 2060000000 HC ICU INTERMEDIATE R&B

## 2025-02-17 PROCEDURE — 6370000000 HC RX 637 (ALT 250 FOR IP): Performed by: STUDENT IN AN ORGANIZED HEALTH CARE EDUCATION/TRAINING PROGRAM

## 2025-02-17 PROCEDURE — 94640 AIRWAY INHALATION TREATMENT: CPT

## 2025-02-17 PROCEDURE — 87635 SARS-COV-2 COVID-19 AMP PRB: CPT

## 2025-02-17 PROCEDURE — 2700000000 HC OXYGEN THERAPY PER DAY

## 2025-02-17 PROCEDURE — 87040 BLOOD CULTURE FOR BACTERIA: CPT

## 2025-02-17 PROCEDURE — 2500000003 HC RX 250 WO HCPCS

## 2025-02-17 RX ORDER — ARFORMOTEROL TARTRATE 15 UG/2ML
15 SOLUTION RESPIRATORY (INHALATION)
Status: DISCONTINUED | OUTPATIENT
Start: 2025-02-17 | End: 2025-02-20 | Stop reason: HOSPADM

## 2025-02-17 RX ORDER — CETIRIZINE HYDROCHLORIDE 10 MG/1
10 TABLET ORAL DAILY
Status: DISCONTINUED | OUTPATIENT
Start: 2025-02-17 | End: 2025-02-20 | Stop reason: HOSPADM

## 2025-02-17 RX ORDER — POLYETHYLENE GLYCOL 3350 17 G/17G
17 POWDER, FOR SOLUTION ORAL DAILY PRN
Status: DISCONTINUED | OUTPATIENT
Start: 2025-02-17 | End: 2025-02-20 | Stop reason: HOSPADM

## 2025-02-17 RX ORDER — IOPAMIDOL 755 MG/ML
75 INJECTION, SOLUTION INTRAVASCULAR
Status: COMPLETED | OUTPATIENT
Start: 2025-02-17 | End: 2025-02-17

## 2025-02-17 RX ORDER — MAGNESIUM SULFATE IN WATER 40 MG/ML
2000 INJECTION, SOLUTION INTRAVENOUS ONCE
Status: COMPLETED | OUTPATIENT
Start: 2025-02-17 | End: 2025-02-17

## 2025-02-17 RX ORDER — BUDESONIDE 0.5 MG/2ML
0.5 INHALANT ORAL
Status: DISCONTINUED | OUTPATIENT
Start: 2025-02-17 | End: 2025-02-20 | Stop reason: HOSPADM

## 2025-02-17 RX ORDER — ONDANSETRON 4 MG/1
4 TABLET, ORALLY DISINTEGRATING ORAL EVERY 8 HOURS PRN
Status: DISCONTINUED | OUTPATIENT
Start: 2025-02-17 | End: 2025-02-20 | Stop reason: HOSPADM

## 2025-02-17 RX ORDER — METHYLPREDNISOLONE SODIUM SUCCINATE 125 MG/2ML
125 INJECTION INTRAMUSCULAR; INTRAVENOUS ONCE
Status: DISCONTINUED | OUTPATIENT
Start: 2025-02-17 | End: 2025-02-17

## 2025-02-17 RX ORDER — HYDROXYZINE PAMOATE 25 MG/1
25 CAPSULE ORAL ONCE
Status: COMPLETED | OUTPATIENT
Start: 2025-02-17 | End: 2025-02-17

## 2025-02-17 RX ORDER — ARIPIPRAZOLE 5 MG/1
5 TABLET ORAL NIGHTLY
Status: DISCONTINUED | OUTPATIENT
Start: 2025-02-17 | End: 2025-02-20 | Stop reason: HOSPADM

## 2025-02-17 RX ORDER — IPRATROPIUM BROMIDE AND ALBUTEROL SULFATE 2.5; .5 MG/3ML; MG/3ML
1 SOLUTION RESPIRATORY (INHALATION)
Status: DISCONTINUED | OUTPATIENT
Start: 2025-02-17 | End: 2025-02-20 | Stop reason: HOSPADM

## 2025-02-17 RX ORDER — SERTRALINE HYDROCHLORIDE 100 MG/1
100 TABLET, FILM COATED ORAL NIGHTLY
Status: DISCONTINUED | OUTPATIENT
Start: 2025-02-17 | End: 2025-02-20 | Stop reason: HOSPADM

## 2025-02-17 RX ORDER — SENNOSIDES A AND B 8.6 MG/1
1 TABLET, FILM COATED ORAL 2 TIMES DAILY PRN
Status: DISCONTINUED | OUTPATIENT
Start: 2025-02-17 | End: 2025-02-20 | Stop reason: HOSPADM

## 2025-02-17 RX ORDER — IPRATROPIUM BROMIDE AND ALBUTEROL SULFATE 2.5; .5 MG/3ML; MG/3ML
3 SOLUTION RESPIRATORY (INHALATION) ONCE
Status: COMPLETED | OUTPATIENT
Start: 2025-02-17 | End: 2025-02-17

## 2025-02-17 RX ORDER — MAGNESIUM SULFATE IN WATER 40 MG/ML
2000 INJECTION, SOLUTION INTRAVENOUS ONCE
Status: DISCONTINUED | OUTPATIENT
Start: 2025-02-17 | End: 2025-02-17

## 2025-02-17 RX ORDER — DILTIAZEM HYDROCHLORIDE 180 MG/1
180 CAPSULE, COATED, EXTENDED RELEASE ORAL 2 TIMES DAILY
Status: DISCONTINUED | OUTPATIENT
Start: 2025-02-17 | End: 2025-02-20 | Stop reason: HOSPADM

## 2025-02-17 RX ORDER — AMMONIUM LACTATE 12 G/100G
LOTION TOPICAL 2 TIMES DAILY
Status: DISCONTINUED | OUTPATIENT
Start: 2025-02-17 | End: 2025-02-20 | Stop reason: HOSPADM

## 2025-02-17 RX ORDER — ASPIRIN 81 MG/1
81 TABLET, CHEWABLE ORAL DAILY
Status: DISCONTINUED | OUTPATIENT
Start: 2025-02-17 | End: 2025-02-20 | Stop reason: HOSPADM

## 2025-02-17 RX ORDER — METHYLPREDNISOLONE SODIUM SUCCINATE 40 MG/ML
40 INJECTION INTRAMUSCULAR; INTRAVENOUS EVERY 8 HOURS
Status: DISCONTINUED | OUTPATIENT
Start: 2025-02-17 | End: 2025-02-19

## 2025-02-17 RX ORDER — ALPRAZOLAM 0.25 MG
0.25 TABLET ORAL NIGHTLY PRN
Status: DISCONTINUED | OUTPATIENT
Start: 2025-02-17 | End: 2025-02-20 | Stop reason: HOSPADM

## 2025-02-17 RX ORDER — ACETAZOLAMIDE 250 MG/1
250 TABLET ORAL DAILY
Status: DISCONTINUED | OUTPATIENT
Start: 2025-02-17 | End: 2025-02-20 | Stop reason: HOSPADM

## 2025-02-17 RX ORDER — METOPROLOL TARTRATE 25 MG/1
25 TABLET, FILM COATED ORAL 2 TIMES DAILY
Status: DISCONTINUED | OUTPATIENT
Start: 2025-02-17 | End: 2025-02-20 | Stop reason: HOSPADM

## 2025-02-17 RX ORDER — SODIUM CHLORIDE 9 MG/ML
INJECTION, SOLUTION INTRAVENOUS PRN
Status: DISCONTINUED | OUTPATIENT
Start: 2025-02-17 | End: 2025-02-20 | Stop reason: HOSPADM

## 2025-02-17 RX ORDER — ONDANSETRON 2 MG/ML
4 INJECTION INTRAMUSCULAR; INTRAVENOUS EVERY 6 HOURS PRN
Status: DISCONTINUED | OUTPATIENT
Start: 2025-02-17 | End: 2025-02-20 | Stop reason: HOSPADM

## 2025-02-17 RX ORDER — SODIUM CHLORIDE 0.9 % (FLUSH) 0.9 %
5-40 SYRINGE (ML) INJECTION PRN
Status: DISCONTINUED | OUTPATIENT
Start: 2025-02-17 | End: 2025-02-20 | Stop reason: HOSPADM

## 2025-02-17 RX ORDER — SODIUM CHLORIDE 0.9 % (FLUSH) 0.9 %
5-40 SYRINGE (ML) INJECTION EVERY 12 HOURS SCHEDULED
Status: DISCONTINUED | OUTPATIENT
Start: 2025-02-17 | End: 2025-02-20 | Stop reason: HOSPADM

## 2025-02-17 RX ORDER — ACETAMINOPHEN 325 MG/1
650 TABLET ORAL EVERY 6 HOURS PRN
Status: DISCONTINUED | OUTPATIENT
Start: 2025-02-17 | End: 2025-02-20 | Stop reason: HOSPADM

## 2025-02-17 RX ORDER — LOSARTAN POTASSIUM 50 MG/1
50 TABLET ORAL DAILY
Status: DISCONTINUED | OUTPATIENT
Start: 2025-02-17 | End: 2025-02-20 | Stop reason: HOSPADM

## 2025-02-17 RX ORDER — ACETAMINOPHEN 650 MG/1
650 SUPPOSITORY RECTAL EVERY 6 HOURS PRN
Status: DISCONTINUED | OUTPATIENT
Start: 2025-02-17 | End: 2025-02-20 | Stop reason: HOSPADM

## 2025-02-17 RX ADMIN — IPRATROPIUM BROMIDE AND ALBUTEROL SULFATE 3 DOSE: .5; 2.5 SOLUTION RESPIRATORY (INHALATION) at 07:11

## 2025-02-17 RX ADMIN — IOPAMIDOL 75 ML: 755 INJECTION, SOLUTION INTRAVENOUS at 12:14

## 2025-02-17 RX ADMIN — CETIRIZINE HYDROCHLORIDE 10 MG: 10 TABLET, FILM COATED ORAL at 18:49

## 2025-02-17 RX ADMIN — SERTRALINE 100 MG: 100 TABLET, FILM COATED ORAL at 21:56

## 2025-02-17 RX ADMIN — MAGNESIUM SULFATE HEPTAHYDRATE 2000 MG: 40 INJECTION, SOLUTION INTRAVENOUS at 07:49

## 2025-02-17 RX ADMIN — SODIUM CHLORIDE, PRESERVATIVE FREE 40 MG: 5 INJECTION INTRAVENOUS at 18:49

## 2025-02-17 RX ADMIN — SODIUM CHLORIDE, PRESERVATIVE FREE 10 ML: 5 INJECTION INTRAVENOUS at 21:57

## 2025-02-17 RX ADMIN — METHYLPREDNISOLONE SODIUM SUCCINATE 40 MG: 40 INJECTION INTRAMUSCULAR; INTRAVENOUS at 18:48

## 2025-02-17 RX ADMIN — METOPROLOL TARTRATE 25 MG: 25 TABLET, FILM COATED ORAL at 21:56

## 2025-02-17 RX ADMIN — LOSARTAN POTASSIUM 50 MG: 50 TABLET, FILM COATED ORAL at 18:49

## 2025-02-17 RX ADMIN — Medication: at 21:57

## 2025-02-17 RX ADMIN — IPRATROPIUM BROMIDE AND ALBUTEROL SULFATE 1 DOSE: .5; 2.5 SOLUTION RESPIRATORY (INHALATION) at 20:07

## 2025-02-17 RX ADMIN — HYDROXYZINE PAMOATE 25 MG: 25 CAPSULE ORAL at 14:33

## 2025-02-17 RX ADMIN — ACETAZOLAMIDE 250 MG: 250 TABLET ORAL at 21:56

## 2025-02-17 RX ADMIN — IPRATROPIUM BROMIDE AND ALBUTEROL SULFATE 1 DOSE: .5; 2.5 SOLUTION RESPIRATORY (INHALATION) at 17:15

## 2025-02-17 RX ADMIN — ARIPIPRAZOLE 5 MG: 5 TABLET ORAL at 21:56

## 2025-02-17 RX ADMIN — DILTIAZEM HYDROCHLORIDE 180 MG: 180 CAPSULE, COATED, EXTENDED RELEASE ORAL at 21:56

## 2025-02-17 RX ADMIN — ASPIRIN 81 MG CHEWABLE TABLET 81 MG: 81 TABLET CHEWABLE at 18:49

## 2025-02-17 RX ADMIN — DRONEDARONE 400 MG: 400 TABLET, FILM COATED ORAL at 21:56

## 2025-02-17 ASSESSMENT — ENCOUNTER SYMPTOMS
SHORTNESS OF BREATH: 1
ALLERGIC/IMMUNOLOGIC NEGATIVE: 1
GASTROINTESTINAL NEGATIVE: 1
EYES NEGATIVE: 1

## 2025-02-17 ASSESSMENT — PAIN - FUNCTIONAL ASSESSMENT: PAIN_FUNCTIONAL_ASSESSMENT: NONE - DENIES PAIN

## 2025-02-17 NOTE — H&P
[facility]  Resident History and Physical      CHIEF COMPLAINT:    Chief Complaint   Patient presents with    Shortness of Breath    Respiratory Distress     Hx of COPD. EMS put patient on C-PAP, gave (2) duonebs, 125 solumedrol         History of Present Illness:   Priya Garcia  is a 84 y.o. female patient of David Coffey MD  with a pertinent PMHx of COPD with 45 pack year smoking history but quit 23 years ago,Anemia, GI Bleed 2/2 Gastritis, HTN, Anxiety, H/o DVT, PAD, HLD, Left BKA in 2017 who presented to the ER from home via EMS with chief complaint of increasing shortness of breath over the last 2-3 days.  Patient has been complaining of increasing shortness of breath for the last 2-3 days. No c/o chest pain, cough, sputum production, fever, chills. Potential exposure to sick contact is her  who has been having the \"cold\" for a few days, however he tested negative for the COVID and flu.   To note, patient does not use CPAP at night.   Patient mentions that every time she takes her iron pill she has black stools and she occasionally has blood mixed in the stools which she attributes to hemorrhoids.    ED course: In the ED, vitals were significant for NV-68, RR- 35, BP- 142/90mmHg, SpO2 85% on 6L oxygen(patient's basline) -> Switched to BiPAP and saturation improved to 100%, ABG showed pH- 7.28, pO2 - 252.4, pCO2 - 87, HCO3 - 40.6. Rapid Flu, COIVD - Negative. D-Dimer - 379, Troponins stable 20->19, RBC - 3.42, Hb - 9.1, Hct - 33.6, MCHC - 27.1, RDW - 23.9, CO2 - 39, Glucose - 144, NT-Pro BNP - 818. Blood cultures ordered and pending.   Patient was managed with DUONEBs x3, IV Mag 2g x1, IV MPS 125mg x1.  Patient was initiated on BiPAP in the ED and repeat ABG showed - pH: 7.38, pCO2: 69.2, pO2 - 87.9, HCO3 - 39.7.   CXR done in the ED showed GG airspace disease within the right and left lower lobes s/o vascular congestion vs pneumonia.  CT-A Pulmonary with

## 2025-02-17 NOTE — ED NOTES
Patient has removed bi-pap several times and has been educated on the importance of leaving it on and the risks of not having. This RN has adjusted the mask several times to help make it more comfortable for the patient.

## 2025-02-18 LAB
ALBUMIN SERPL-MCNC: 4.3 G/DL (ref 3.5–5.2)
ALP SERPL-CCNC: 59 U/L (ref 35–104)
ALT SERPL-CCNC: 16 U/L (ref 0–32)
ANION GAP SERPL CALCULATED.3IONS-SCNC: 8 MMOL/L (ref 7–16)
AST SERPL-CCNC: 16 U/L (ref 0–31)
B.E.: 12.7 MMOL/L (ref -3–3)
B.E.: 14.8 MMOL/L (ref -3–3)
BASOPHILS # BLD: 0 K/UL (ref 0–0.2)
BASOPHILS NFR BLD: 0 % (ref 0–2)
BILIRUB SERPL-MCNC: 0.3 MG/DL (ref 0–1.2)
BUN SERPL-MCNC: 21 MG/DL (ref 6–23)
CALCIUM SERPL-MCNC: 9.5 MG/DL (ref 8.6–10.2)
CHLORIDE SERPL-SCNC: 99 MMOL/L (ref 98–107)
CO2 SERPL-SCNC: 37 MMOL/L (ref 22–29)
COHB: 0.5 % (ref 0–1.5)
COHB: 1 % (ref 0–1.5)
CREAT SERPL-MCNC: 0.9 MG/DL (ref 0.5–1)
CRITICAL: ABNORMAL
CRITICAL: ABNORMAL
DATE ANALYZED: ABNORMAL
DATE ANALYZED: ABNORMAL
DATE OF COLLECTION: ABNORMAL
DATE OF COLLECTION: ABNORMAL
EKG ATRIAL RATE: 62 BPM
EKG P AXIS: 89 DEGREES
EKG P-R INTERVAL: 134 MS
EKG Q-T INTERVAL: 426 MS
EKG QRS DURATION: 84 MS
EKG QTC CALCULATION (BAZETT): 432 MS
EKG R AXIS: 66 DEGREES
EKG T AXIS: 80 DEGREES
EKG VENTRICULAR RATE: 62 BPM
EOSINOPHIL # BLD: 0 K/UL (ref 0.05–0.5)
EOSINOPHILS RELATIVE PERCENT: 0 % (ref 0–6)
ERYTHROCYTE [DISTWIDTH] IN BLOOD BY AUTOMATED COUNT: 24.4 % (ref 11.5–15)
FIO2: 40 %
FIO2: 40 %
FOLATE SERPL-MCNC: 4.2 NG/ML (ref 4.8–24.2)
GFR, ESTIMATED: 61 ML/MIN/1.73M2
GLUCOSE SERPL-MCNC: 134 MG/DL (ref 74–99)
HCO3: 39.6 MMOL/L (ref 22–26)
HCO3: 42.3 MMOL/L (ref 22–26)
HCT VFR BLD AUTO: 32.7 % (ref 34–48)
HGB BLD-MCNC: 8.7 G/DL (ref 11.5–15.5)
HHB: 1.5 % (ref 0–5)
HHB: 29.3 % (ref 0–5)
IRON SATN MFR SERPL: 7 % (ref 15–50)
IRON SERPL-MCNC: 22 UG/DL (ref 37–145)
LAB: ABNORMAL
LAB: ABNORMAL
LYMPHOCYTES NFR BLD: 0.23 K/UL (ref 1.5–4)
LYMPHOCYTES RELATIVE PERCENT: 6 % (ref 20–42)
Lab: 300
Lab: 650
MAGNESIUM SERPL-MCNC: 2.7 MG/DL (ref 1.6–2.6)
MCH RBC QN AUTO: 26.1 PG (ref 26–35)
MCHC RBC AUTO-ENTMCNC: 26.6 G/DL (ref 32–34.5)
MCV RBC AUTO: 98.2 FL (ref 80–99.9)
METHB: 0.3 % (ref 0–1.5)
METHB: 0.3 % (ref 0–1.5)
MODE: ABNORMAL
MODE: ABNORMAL
MONOCYTES NFR BLD: 0 % (ref 2–12)
MONOCYTES NFR BLD: 0 K/UL (ref 0.1–0.95)
NEUTROPHILS NFR BLD: 94 % (ref 43–80)
NEUTS SEG NFR BLD: 3.47 K/UL (ref 1.8–7.3)
O2 CONTENT: 13 ML/DL
O2 CONTENT: 8.7 ML/DL
O2 SATURATION: 70.3 % (ref 92–98.5)
O2 SATURATION: 98.5 % (ref 92–98.5)
O2HB: 69.4 % (ref 94–97)
O2HB: 97.7 % (ref 94–97)
OPERATOR ID: ABNORMAL
OPERATOR ID: ABNORMAL
PATIENT TEMP: 37 C
PATIENT TEMP: 37 C
PCO2: 67 MMHG (ref 35–45)
PCO2: 74.1 MMHG (ref 35–45)
PFO2: 0.95 MMHG/%
PFO2: 2.87 MMHG/%
PH BLOOD GAS: 7.37 (ref 7.35–7.45)
PH BLOOD GAS: 7.39 (ref 7.35–7.45)
PLATELET # BLD AUTO: 214 K/UL (ref 130–450)
PMV BLD AUTO: 9.2 FL (ref 7–12)
PO2: 114.6 MMHG (ref 75–100)
PO2: 37.9 MMHG (ref 75–100)
POTASSIUM SERPL-SCNC: 3.5 MMOL/L (ref 3.5–5)
PROT SERPL-MCNC: 7.3 G/DL (ref 6.4–8.3)
RBC # BLD AUTO: 3.33 M/UL (ref 3.5–5.5)
RBC # BLD: ABNORMAL 10*6/UL
RI(T): 0.82
RI(T): 4.28
SODIUM SERPL-SCNC: 144 MMOL/L (ref 132–146)
SOURCE, BLOOD GAS: ABNORMAL
SOURCE, BLOOD GAS: ABNORMAL
THB: 8.9 G/DL (ref 11.5–16.5)
THB: 9.3 G/DL (ref 11.5–16.5)
TIBC SERPL-MCNC: 337 UG/DL (ref 250–450)
TIME ANALYZED: 308
TIME ANALYZED: 653
VIT B12 SERPL-MCNC: 639 PG/ML (ref 211–946)
WBC OTHER # BLD: 3.7 K/UL (ref 4.5–11.5)

## 2025-02-18 PROCEDURE — 36415 COLL VENOUS BLD VENIPUNCTURE: CPT

## 2025-02-18 PROCEDURE — 82746 ASSAY OF FOLIC ACID SERUM: CPT

## 2025-02-18 PROCEDURE — 94640 AIRWAY INHALATION TREATMENT: CPT

## 2025-02-18 PROCEDURE — 83540 ASSAY OF IRON: CPT

## 2025-02-18 PROCEDURE — 94660 CPAP INITIATION&MGMT: CPT

## 2025-02-18 PROCEDURE — 2060000000 HC ICU INTERMEDIATE R&B

## 2025-02-18 PROCEDURE — 83550 IRON BINDING TEST: CPT

## 2025-02-18 PROCEDURE — 83735 ASSAY OF MAGNESIUM: CPT

## 2025-02-18 PROCEDURE — 85025 COMPLETE CBC W/AUTO DIFF WBC: CPT

## 2025-02-18 PROCEDURE — 2580000003 HC RX 258

## 2025-02-18 PROCEDURE — 6370000000 HC RX 637 (ALT 250 FOR IP)

## 2025-02-18 PROCEDURE — 2700000000 HC OXYGEN THERAPY PER DAY

## 2025-02-18 PROCEDURE — 82607 VITAMIN B-12: CPT

## 2025-02-18 PROCEDURE — 80053 COMPREHEN METABOLIC PANEL: CPT

## 2025-02-18 PROCEDURE — 99222 1ST HOSP IP/OBS MODERATE 55: CPT | Performed by: INTERNAL MEDICINE

## 2025-02-18 PROCEDURE — 99232 SBSQ HOSP IP/OBS MODERATE 35: CPT | Performed by: FAMILY MEDICINE

## 2025-02-18 PROCEDURE — 93010 ELECTROCARDIOGRAM REPORT: CPT | Performed by: INTERNAL MEDICINE

## 2025-02-18 PROCEDURE — 6360000002 HC RX W HCPCS

## 2025-02-18 PROCEDURE — 82805 BLOOD GASES W/O2 SATURATION: CPT

## 2025-02-18 PROCEDURE — 2500000003 HC RX 250 WO HCPCS

## 2025-02-18 RX ADMIN — METHYLPREDNISOLONE SODIUM SUCCINATE 40 MG: 40 INJECTION INTRAMUSCULAR; INTRAVENOUS at 08:05

## 2025-02-18 RX ADMIN — ARFORMOTEROL TARTRATE 15 MCG: 15 SOLUTION RESPIRATORY (INHALATION) at 08:02

## 2025-02-18 RX ADMIN — SODIUM CHLORIDE, PRESERVATIVE FREE 40 MG: 5 INJECTION INTRAVENOUS at 08:05

## 2025-02-18 RX ADMIN — IPRATROPIUM BROMIDE AND ALBUTEROL SULFATE 1 DOSE: .5; 2.5 SOLUTION RESPIRATORY (INHALATION) at 15:42

## 2025-02-18 RX ADMIN — DILTIAZEM HYDROCHLORIDE 180 MG: 180 CAPSULE, COATED, EXTENDED RELEASE ORAL at 20:19

## 2025-02-18 RX ADMIN — ARIPIPRAZOLE 5 MG: 5 TABLET ORAL at 20:19

## 2025-02-18 RX ADMIN — LOSARTAN POTASSIUM 50 MG: 50 TABLET, FILM COATED ORAL at 08:05

## 2025-02-18 RX ADMIN — BUDESONIDE 500 MCG: 0.5 SUSPENSION RESPIRATORY (INHALATION) at 20:31

## 2025-02-18 RX ADMIN — METOPROLOL TARTRATE 25 MG: 25 TABLET, FILM COATED ORAL at 20:19

## 2025-02-18 RX ADMIN — ARFORMOTEROL TARTRATE 15 MCG: 15 SOLUTION RESPIRATORY (INHALATION) at 20:31

## 2025-02-18 RX ADMIN — SODIUM CHLORIDE, PRESERVATIVE FREE 10 ML: 5 INJECTION INTRAVENOUS at 08:05

## 2025-02-18 RX ADMIN — IPRATROPIUM BROMIDE AND ALBUTEROL SULFATE 1 DOSE: .5; 2.5 SOLUTION RESPIRATORY (INHALATION) at 11:54

## 2025-02-18 RX ADMIN — CETIRIZINE HYDROCHLORIDE 10 MG: 10 TABLET, FILM COATED ORAL at 08:05

## 2025-02-18 RX ADMIN — IPRATROPIUM BROMIDE AND ALBUTEROL SULFATE 1 DOSE: .5; 2.5 SOLUTION RESPIRATORY (INHALATION) at 03:42

## 2025-02-18 RX ADMIN — IPRATROPIUM BROMIDE AND ALBUTEROL SULFATE 1 DOSE: .5; 2.5 SOLUTION RESPIRATORY (INHALATION) at 00:23

## 2025-02-18 RX ADMIN — Medication: at 08:06

## 2025-02-18 RX ADMIN — IPRATROPIUM BROMIDE AND ALBUTEROL SULFATE 1 DOSE: .5; 2.5 SOLUTION RESPIRATORY (INHALATION) at 08:02

## 2025-02-18 RX ADMIN — SODIUM CHLORIDE, PRESERVATIVE FREE 10 ML: 5 INJECTION INTRAVENOUS at 20:20

## 2025-02-18 RX ADMIN — ACETAZOLAMIDE 250 MG: 250 TABLET ORAL at 08:05

## 2025-02-18 RX ADMIN — METHYLPREDNISOLONE SODIUM SUCCINATE 40 MG: 40 INJECTION INTRAMUSCULAR; INTRAVENOUS at 00:16

## 2025-02-18 RX ADMIN — METHYLPREDNISOLONE SODIUM SUCCINATE 40 MG: 40 INJECTION INTRAMUSCULAR; INTRAVENOUS at 16:19

## 2025-02-18 RX ADMIN — BUDESONIDE 500 MCG: 0.5 SUSPENSION RESPIRATORY (INHALATION) at 08:02

## 2025-02-18 RX ADMIN — Medication: at 20:19

## 2025-02-18 RX ADMIN — SERTRALINE 100 MG: 100 TABLET, FILM COATED ORAL at 20:19

## 2025-02-18 RX ADMIN — DILTIAZEM HYDROCHLORIDE 180 MG: 180 CAPSULE, COATED, EXTENDED RELEASE ORAL at 08:05

## 2025-02-18 RX ADMIN — DRONEDARONE 400 MG: 400 TABLET, FILM COATED ORAL at 08:05

## 2025-02-18 RX ADMIN — DRONEDARONE 400 MG: 400 TABLET, FILM COATED ORAL at 16:19

## 2025-02-18 RX ADMIN — IPRATROPIUM BROMIDE AND ALBUTEROL SULFATE 1 DOSE: .5; 2.5 SOLUTION RESPIRATORY (INHALATION) at 20:31

## 2025-02-18 RX ADMIN — ASPIRIN 81 MG CHEWABLE TABLET 81 MG: 81 TABLET CHEWABLE at 08:05

## 2025-02-18 RX ADMIN — METOPROLOL TARTRATE 25 MG: 25 TABLET, FILM COATED ORAL at 08:05

## 2025-02-18 NOTE — CARE COORDINATION
CASE MANAGEMENT..... Met with patient at the bedside and with daughter in law Lacey over the phone. Mrs Garcia voices being independent from home with her  and their son George. They live in a one floor condo, but have been considering Assisted Living. She wears o2 6Lnc through [employer] and has nebulizer. Has L BKA with prosthesis (wearing). Has wheelchair, shower chair and walker. PCP is Dr Coffey. Per Lacey, patient and her  have been showing signs of increased confusion. Lacey states patient has been falling and she is unsure if she is taking her meds as directed. PT/OT on consult for dc planning. She is interested in [facility] vs [facility] pending criteria. Lists for [facility]/Assisted Living provided. Pulm on board. On aerosols, iv solumedrol q 8hrs, iv protonix and clear liquids. Will follow along.

## 2025-02-18 NOTE — CONSULTS
Pulmonary Critical Care Medicine      PULMONARY CRITICAL CARE CONSULTATION NOTE.    02/18/25    CONSULTING  PHYSICIAN: Elizabeth Leyva MD     Assessment / Recommendations-   Acute on chronic hypercapnic  respiratory insufficiency secondary to AECOPD leading to CO2 narcosis requiring NIPPV   Currently on home oxygen requirement at 6 L/min, CT scan of the chest reveals chronic findings    -Continue care in [facility] floor  - Continue NIPPV( BiPAP) , serial ABGs, wean supplemental O2 as tolerated   - Check Respiratory viral Panel   - Sputum cultures   - Urine Antigens   - Duonebs  Scheduled   - Continue Pulmicort and perforomist  nebulizations   - Cycle Troponins   - Diuresis - lasix 40 IV  once   - on ceftriaxone and doxycycline , de-escalate as the micro data is available   - systemic steroids IV  , taper after 72 hrs    -Continue the use of incentive spirometer and flutter valve  -Out of bed to chair, PT OT consult and ambulate as tolerated on supplemental oxygen      History of Present Illness    Priya Garcia  is a 84 y.o. female with history of COPD who was initially brought to the emergency room with complaints of worsening shortness of breath and dyspnea on exertion for the last few days.  Sick contacts includes patient's  who had flulike symptoms for the last week or so.  He was tested for flu and COVID at home and was negative.  In the emergency room she was found to have hypercapnia with pCO2 of 87 and pH of 7.2 on the blood gas analysis.  She was placed on NIV/BiPAP with subsequent improvement in oxygenation as well as hypercapnia.  She was tested for flu and COVID and were negative, complete respiratory viral panel was negative.  She was subsequently admitted to the [facility] floor and pulmonary consultation obtained.    Baseline Exercise tolerance/MMRC score( Bold )     MMRC Dyspnea Scale  Grade Description of Breathlessness   0 I only get breathless with strenuous exercise.   1 I get short

## 2025-02-18 NOTE — ACP (ADVANCE CARE PLANNING)
Advance Care Planning   Healthcare Decision Maker:    Primary Decision Maker: George Garcia - Spouse - [phone]    Supplemental (Other) Decision Maker: Dragna Garcia - Child - [phone]    Supplemental (Other) Decision Maker: Lacey Garcia - Daughter-in-Law - [phone]

## 2025-02-18 NOTE — ED NOTES
ED to Inpatient Handoff Report    Notified 4S that electronic handoff available and patient ready for transport to room 0441.    Safety Risks: None identified    Patient in Restraints: no    Constant Observer or Patient : no    Telemetry Monitoring Ordered: No          Order to transfer to unit without monitor: NO    Last MEWS: 1 Time completed: 1907    Deterioration Index: (!) 62.39    Vitals:    02/17/25 1715 02/17/25 1830 02/17/25 1849 02/17/25 1907   BP:  (!) 160/74 (!) 177/70 (!) 177/70   Pulse: 74 78  78   Resp: 25 (!) 34  20   Temp:    97.6 °F (36.4 °C)   TempSrc:       SpO2: 100% 100%  100%   Weight:       Height:           Opportunity for questions and clarification was provided.

## 2025-02-19 PROBLEM — J44.1 COPD EXACERBATION (HCC): Status: RESOLVED | Noted: 2025-02-17 | Resolved: 2025-02-19

## 2025-02-19 LAB
ALBUMIN SERPL-MCNC: 4 G/DL (ref 3.5–5.2)
ALP SERPL-CCNC: 54 U/L (ref 35–104)
ALT SERPL-CCNC: 15 U/L (ref 0–32)
ANION GAP SERPL CALCULATED.3IONS-SCNC: 7 MMOL/L (ref 7–16)
AST SERPL-CCNC: 17 U/L (ref 0–31)
BASOPHILS # BLD: 0 K/UL (ref 0–0.2)
BASOPHILS NFR BLD: 0 % (ref 0–2)
BILIRUB SERPL-MCNC: 0.2 MG/DL (ref 0–1.2)
BUN SERPL-MCNC: 29 MG/DL (ref 6–23)
CALCIUM SERPL-MCNC: 9.2 MG/DL (ref 8.6–10.2)
CHLORIDE SERPL-SCNC: 102 MMOL/L (ref 98–107)
CO2 SERPL-SCNC: 36 MMOL/L (ref 22–29)
CREAT SERPL-MCNC: 1.1 MG/DL (ref 0.5–1)
EOSINOPHIL # BLD: 0 K/UL (ref 0.05–0.5)
EOSINOPHILS RELATIVE PERCENT: 0 % (ref 0–6)
ERYTHROCYTE [DISTWIDTH] IN BLOOD BY AUTOMATED COUNT: 24.2 % (ref 11.5–15)
GFR, ESTIMATED: 53 ML/MIN/1.73M2
GLUCOSE SERPL-MCNC: 151 MG/DL (ref 74–99)
HCT VFR BLD AUTO: 31.3 % (ref 34–48)
HGB BLD-MCNC: 8.5 G/DL (ref 11.5–15.5)
IMM GRANULOCYTES # BLD AUTO: <0.03 K/UL (ref 0–0.58)
IMM GRANULOCYTES NFR BLD: 0 % (ref 0–5)
LYMPHOCYTES NFR BLD: 0.25 K/UL (ref 1.5–4)
LYMPHOCYTES RELATIVE PERCENT: 6 % (ref 20–42)
MCH RBC QN AUTO: 26.6 PG (ref 26–35)
MCHC RBC AUTO-ENTMCNC: 27.2 G/DL (ref 32–34.5)
MCV RBC AUTO: 97.8 FL (ref 80–99.9)
MONOCYTES NFR BLD: 0.22 K/UL (ref 0.1–0.95)
MONOCYTES NFR BLD: 5 % (ref 2–12)
NEUTROPHILS NFR BLD: 89 % (ref 43–80)
NEUTS SEG NFR BLD: 4.05 K/UL (ref 1.8–7.3)
PLATELET # BLD AUTO: 210 K/UL (ref 130–450)
PMV BLD AUTO: 9.2 FL (ref 7–12)
POTASSIUM SERPL-SCNC: 3.7 MMOL/L (ref 3.5–5)
PROT SERPL-MCNC: 6.8 G/DL (ref 6.4–8.3)
RBC # BLD AUTO: 3.2 M/UL (ref 3.5–5.5)
RBC # BLD: ABNORMAL 10*6/UL
SODIUM SERPL-SCNC: 145 MMOL/L (ref 132–146)
WBC OTHER # BLD: 4.5 K/UL (ref 4.5–11.5)

## 2025-02-19 PROCEDURE — 6360000002 HC RX W HCPCS

## 2025-02-19 PROCEDURE — 85025 COMPLETE CBC W/AUTO DIFF WBC: CPT

## 2025-02-19 PROCEDURE — 36415 COLL VENOUS BLD VENIPUNCTURE: CPT

## 2025-02-19 PROCEDURE — 94640 AIRWAY INHALATION TREATMENT: CPT

## 2025-02-19 PROCEDURE — 94660 CPAP INITIATION&MGMT: CPT

## 2025-02-19 PROCEDURE — 97165 OT EVAL LOW COMPLEX 30 MIN: CPT

## 2025-02-19 PROCEDURE — 97535 SELF CARE MNGMENT TRAINING: CPT

## 2025-02-19 PROCEDURE — 99231 SBSQ HOSP IP/OBS SF/LOW 25: CPT | Performed by: FAMILY MEDICINE

## 2025-02-19 PROCEDURE — 2500000003 HC RX 250 WO HCPCS

## 2025-02-19 PROCEDURE — 94669 MECHANICAL CHEST WALL OSCILL: CPT

## 2025-02-19 PROCEDURE — 80053 COMPREHEN METABOLIC PANEL: CPT

## 2025-02-19 PROCEDURE — 2700000000 HC OXYGEN THERAPY PER DAY

## 2025-02-19 PROCEDURE — 6370000000 HC RX 637 (ALT 250 FOR IP): Performed by: STUDENT IN AN ORGANIZED HEALTH CARE EDUCATION/TRAINING PROGRAM

## 2025-02-19 PROCEDURE — 99233 SBSQ HOSP IP/OBS HIGH 50: CPT | Performed by: INTERNAL MEDICINE

## 2025-02-19 PROCEDURE — 6370000000 HC RX 637 (ALT 250 FOR IP)

## 2025-02-19 PROCEDURE — 2580000003 HC RX 258

## 2025-02-19 PROCEDURE — 97161 PT EVAL LOW COMPLEX 20 MIN: CPT

## 2025-02-19 PROCEDURE — 2060000000 HC ICU INTERMEDIATE R&B

## 2025-02-19 PROCEDURE — 97530 THERAPEUTIC ACTIVITIES: CPT

## 2025-02-19 RX ORDER — FERROUS SULFATE 325(65) MG
325 TABLET ORAL
Status: DISCONTINUED | OUTPATIENT
Start: 2025-02-19 | End: 2025-02-20 | Stop reason: HOSPADM

## 2025-02-19 RX ORDER — ASCORBIC ACID 500 MG
500 TABLET ORAL DAILY
Status: DISCONTINUED | OUTPATIENT
Start: 2025-02-19 | End: 2025-02-20 | Stop reason: HOSPADM

## 2025-02-19 RX ORDER — DOCUSATE SODIUM 100 MG/1
100 CAPSULE, LIQUID FILLED ORAL DAILY
Status: DISCONTINUED | OUTPATIENT
Start: 2025-02-19 | End: 2025-02-20 | Stop reason: HOSPADM

## 2025-02-19 RX ADMIN — PREDNISONE 30 MG: 20 TABLET ORAL at 20:41

## 2025-02-19 RX ADMIN — IPRATROPIUM BROMIDE AND ALBUTEROL SULFATE 1 DOSE: .5; 2.5 SOLUTION RESPIRATORY (INHALATION) at 15:59

## 2025-02-19 RX ADMIN — METOPROLOL TARTRATE 25 MG: 25 TABLET, FILM COATED ORAL at 20:41

## 2025-02-19 RX ADMIN — SERTRALINE 100 MG: 100 TABLET, FILM COATED ORAL at 20:41

## 2025-02-19 RX ADMIN — SODIUM CHLORIDE, PRESERVATIVE FREE 10 ML: 5 INJECTION INTRAVENOUS at 07:54

## 2025-02-19 RX ADMIN — OXYCODONE HYDROCHLORIDE AND ACETAMINOPHEN 500 MG: 500 TABLET ORAL at 09:34

## 2025-02-19 RX ADMIN — DRONEDARONE 400 MG: 400 TABLET, FILM COATED ORAL at 16:21

## 2025-02-19 RX ADMIN — FERROUS SULFATE TAB 325 MG (65 MG ELEMENTAL FE) 325 MG: 325 (65 FE) TAB at 09:34

## 2025-02-19 RX ADMIN — SODIUM CHLORIDE, PRESERVATIVE FREE 10 ML: 5 INJECTION INTRAVENOUS at 20:43

## 2025-02-19 RX ADMIN — DILTIAZEM HYDROCHLORIDE 180 MG: 180 CAPSULE, COATED, EXTENDED RELEASE ORAL at 20:41

## 2025-02-19 RX ADMIN — ASPIRIN 81 MG CHEWABLE TABLET 81 MG: 81 TABLET CHEWABLE at 07:52

## 2025-02-19 RX ADMIN — BUDESONIDE 500 MCG: 0.5 SUSPENSION RESPIRATORY (INHALATION) at 08:28

## 2025-02-19 RX ADMIN — DRONEDARONE 400 MG: 400 TABLET, FILM COATED ORAL at 07:53

## 2025-02-19 RX ADMIN — LOSARTAN POTASSIUM 50 MG: 50 TABLET, FILM COATED ORAL at 07:53

## 2025-02-19 RX ADMIN — Medication: at 20:42

## 2025-02-19 RX ADMIN — Medication: at 07:53

## 2025-02-19 RX ADMIN — IPRATROPIUM BROMIDE AND ALBUTEROL SULFATE 1 DOSE: .5; 2.5 SOLUTION RESPIRATORY (INHALATION) at 00:24

## 2025-02-19 RX ADMIN — SODIUM CHLORIDE, PRESERVATIVE FREE 10 ML: 5 INJECTION INTRAVENOUS at 00:04

## 2025-02-19 RX ADMIN — IPRATROPIUM BROMIDE AND ALBUTEROL SULFATE 1 DOSE: .5; 2.5 SOLUTION RESPIRATORY (INHALATION) at 08:29

## 2025-02-19 RX ADMIN — CETIRIZINE HYDROCHLORIDE 10 MG: 10 TABLET, FILM COATED ORAL at 07:53

## 2025-02-19 RX ADMIN — METHYLPREDNISOLONE SODIUM SUCCINATE 40 MG: 40 INJECTION INTRAMUSCULAR; INTRAVENOUS at 07:53

## 2025-02-19 RX ADMIN — ARFORMOTEROL TARTRATE 15 MCG: 15 SOLUTION RESPIRATORY (INHALATION) at 08:28

## 2025-02-19 RX ADMIN — ARIPIPRAZOLE 5 MG: 5 TABLET ORAL at 20:47

## 2025-02-19 RX ADMIN — DOCUSATE SODIUM 100 MG: 100 CAPSULE, LIQUID FILLED ORAL at 09:34

## 2025-02-19 RX ADMIN — DILTIAZEM HYDROCHLORIDE 180 MG: 180 CAPSULE, COATED, EXTENDED RELEASE ORAL at 07:53

## 2025-02-19 RX ADMIN — METHYLPREDNISOLONE SODIUM SUCCINATE 40 MG: 40 INJECTION INTRAMUSCULAR; INTRAVENOUS at 00:04

## 2025-02-19 RX ADMIN — ACETAZOLAMIDE 250 MG: 250 TABLET ORAL at 07:52

## 2025-02-19 RX ADMIN — ARFORMOTEROL TARTRATE 15 MCG: 15 SOLUTION RESPIRATORY (INHALATION) at 21:03

## 2025-02-19 RX ADMIN — IPRATROPIUM BROMIDE AND ALBUTEROL SULFATE 1 DOSE: .5; 2.5 SOLUTION RESPIRATORY (INHALATION) at 12:32

## 2025-02-19 RX ADMIN — BUDESONIDE 500 MCG: 0.5 SUSPENSION RESPIRATORY (INHALATION) at 21:03

## 2025-02-19 RX ADMIN — IPRATROPIUM BROMIDE AND ALBUTEROL SULFATE 1 DOSE: .5; 2.5 SOLUTION RESPIRATORY (INHALATION) at 21:03

## 2025-02-19 RX ADMIN — SODIUM CHLORIDE, PRESERVATIVE FREE 40 MG: 5 INJECTION INTRAVENOUS at 07:53

## 2025-02-19 RX ADMIN — METOPROLOL TARTRATE 25 MG: 25 TABLET, FILM COATED ORAL at 07:53

## 2025-02-19 NOTE — CARE COORDINATION
CASE MANAGEMENT... PT 16/OT 15. Met with patient to discuss dc plans. She is interested in [facility]. States her son, Dragan, has the list of choices, but he is at work till 5pm today. Attempted to call him at [phone], but vm not confidential and unable to leave message. Attempted to speak with daughter in law, Lacey [phone], but no answer and also unable to leave vm. Was able to speak with , George, and he is agreeable to [facility]. Referral called to Deb with the facility-she will review-aware of need for bipap at . Precert will be required. Will follow.     UPDATE, 3:55 pm..... Deb from [facility] accepted patient and will start precert. Updated daughter in law, Lacey, on plans. NEED [address], ambulette forms and [id number].

## 2025-02-20 ENCOUNTER — TELEPHONE (OUTPATIENT)
Dept: FAMILY MEDICINE CLINIC | Age: 85
End: 2025-02-20

## 2025-02-20 VITALS
DIASTOLIC BLOOD PRESSURE: 62 MMHG | OXYGEN SATURATION: 100 % | WEIGHT: 188.05 LBS | SYSTOLIC BLOOD PRESSURE: 128 MMHG | BODY MASS INDEX: 31.33 KG/M2 | HEART RATE: 76 BPM | HEIGHT: 65 IN | TEMPERATURE: 97.5 F | RESPIRATION RATE: 22 BRPM

## 2025-02-20 LAB
ANION GAP SERPL CALCULATED.3IONS-SCNC: 5 MMOL/L (ref 7–16)
BUN SERPL-MCNC: 39 MG/DL (ref 6–23)
CALCIUM SERPL-MCNC: 9.1 MG/DL (ref 8.6–10.2)
CHLORIDE SERPL-SCNC: 103 MMOL/L (ref 98–107)
CO2 SERPL-SCNC: 36 MMOL/L (ref 22–29)
CREAT SERPL-MCNC: 1.3 MG/DL (ref 0.5–1)
GFR, ESTIMATED: 42 ML/MIN/1.73M2
GLUCOSE SERPL-MCNC: 150 MG/DL (ref 74–99)
POTASSIUM SERPL-SCNC: 4.1 MMOL/L (ref 3.5–5)
SODIUM SERPL-SCNC: 144 MMOL/L (ref 132–146)

## 2025-02-20 PROCEDURE — 2700000000 HC OXYGEN THERAPY PER DAY

## 2025-02-20 PROCEDURE — 6370000000 HC RX 637 (ALT 250 FOR IP)

## 2025-02-20 PROCEDURE — 99233 SBSQ HOSP IP/OBS HIGH 50: CPT | Performed by: INTERNAL MEDICINE

## 2025-02-20 PROCEDURE — 94669 MECHANICAL CHEST WALL OSCILL: CPT

## 2025-02-20 PROCEDURE — 6360000002 HC RX W HCPCS

## 2025-02-20 PROCEDURE — 84165 PROTEIN E-PHORESIS SERUM: CPT

## 2025-02-20 PROCEDURE — 6370000000 HC RX 637 (ALT 250 FOR IP): Performed by: STUDENT IN AN ORGANIZED HEALTH CARE EDUCATION/TRAINING PROGRAM

## 2025-02-20 PROCEDURE — 80048 BASIC METABOLIC PNL TOTAL CA: CPT

## 2025-02-20 PROCEDURE — 94660 CPAP INITIATION&MGMT: CPT

## 2025-02-20 PROCEDURE — 94640 AIRWAY INHALATION TREATMENT: CPT

## 2025-02-20 PROCEDURE — 2500000003 HC RX 250 WO HCPCS

## 2025-02-20 PROCEDURE — 84166 PROTEIN E-PHORESIS/URINE/CSF: CPT

## 2025-02-20 PROCEDURE — 84155 ASSAY OF PROTEIN SERUM: CPT

## 2025-02-20 PROCEDURE — 84156 ASSAY OF PROTEIN URINE: CPT

## 2025-02-20 PROCEDURE — 99238 HOSP IP/OBS DSCHRG MGMT 30/<: CPT | Performed by: FAMILY MEDICINE

## 2025-02-20 RX ORDER — PSEUDOEPHEDRINE HCL 30 MG
100 TABLET ORAL 2 TIMES DAILY PRN
DISCHARGE
Start: 2025-02-20

## 2025-02-20 RX ORDER — PREDNISONE 10 MG/1
TABLET ORAL
DISCHARGE
Start: 2025-02-20 | End: 2025-03-09

## 2025-02-20 RX ORDER — POLYETHYLENE GLYCOL 3350 17 G/17G
17 POWDER, FOR SOLUTION ORAL DAILY PRN
DISCHARGE
Start: 2025-02-20 | End: 2025-03-22

## 2025-02-20 RX ORDER — ASCORBIC ACID 500 MG
500 TABLET ORAL DAILY
DISCHARGE
Start: 2025-02-21

## 2025-02-20 RX ORDER — FOLIC ACID 1 MG/1
1 TABLET ORAL DAILY
DISCHARGE
Start: 2025-02-20

## 2025-02-20 RX ADMIN — SODIUM CHLORIDE, PRESERVATIVE FREE 40 MG: 5 INJECTION INTRAVENOUS at 08:49

## 2025-02-20 RX ADMIN — FERROUS SULFATE TAB 325 MG (65 MG ELEMENTAL FE) 325 MG: 325 (65 FE) TAB at 08:49

## 2025-02-20 RX ADMIN — DILTIAZEM HYDROCHLORIDE 180 MG: 180 CAPSULE, COATED, EXTENDED RELEASE ORAL at 08:49

## 2025-02-20 RX ADMIN — LOSARTAN POTASSIUM 50 MG: 50 TABLET, FILM COATED ORAL at 08:49

## 2025-02-20 RX ADMIN — OXYCODONE HYDROCHLORIDE AND ACETAMINOPHEN 500 MG: 500 TABLET ORAL at 08:48

## 2025-02-20 RX ADMIN — IPRATROPIUM BROMIDE AND ALBUTEROL SULFATE 1 DOSE: .5; 2.5 SOLUTION RESPIRATORY (INHALATION) at 12:24

## 2025-02-20 RX ADMIN — CETIRIZINE HYDROCHLORIDE 10 MG: 10 TABLET, FILM COATED ORAL at 08:48

## 2025-02-20 RX ADMIN — IPRATROPIUM BROMIDE AND ALBUTEROL SULFATE 1 DOSE: .5; 2.5 SOLUTION RESPIRATORY (INHALATION) at 15:22

## 2025-02-20 RX ADMIN — ACETAZOLAMIDE 250 MG: 250 TABLET ORAL at 08:49

## 2025-02-20 RX ADMIN — DRONEDARONE 400 MG: 400 TABLET, FILM COATED ORAL at 08:49

## 2025-02-20 RX ADMIN — PREDNISONE 30 MG: 20 TABLET ORAL at 08:48

## 2025-02-20 RX ADMIN — Medication: at 08:50

## 2025-02-20 RX ADMIN — METOPROLOL TARTRATE 25 MG: 25 TABLET, FILM COATED ORAL at 08:49

## 2025-02-20 RX ADMIN — IPRATROPIUM BROMIDE AND ALBUTEROL SULFATE 1 DOSE: .5; 2.5 SOLUTION RESPIRATORY (INHALATION) at 00:53

## 2025-02-20 RX ADMIN — ARFORMOTEROL TARTRATE 15 MCG: 15 SOLUTION RESPIRATORY (INHALATION) at 08:32

## 2025-02-20 RX ADMIN — BUDESONIDE 500 MCG: 0.5 SUSPENSION RESPIRATORY (INHALATION) at 08:32

## 2025-02-20 RX ADMIN — ASPIRIN 81 MG CHEWABLE TABLET 81 MG: 81 TABLET CHEWABLE at 08:48

## 2025-02-20 RX ADMIN — IPRATROPIUM BROMIDE AND ALBUTEROL SULFATE 1 DOSE: .5; 2.5 SOLUTION RESPIRATORY (INHALATION) at 08:32

## 2025-02-20 RX ADMIN — PREDNISONE 30 MG: 20 TABLET ORAL at 15:33

## 2025-02-20 RX ADMIN — DOCUSATE SODIUM 100 MG: 100 CAPSULE, LIQUID FILLED ORAL at 08:49

## 2025-02-20 RX ADMIN — SODIUM CHLORIDE, PRESERVATIVE FREE 10 ML: 5 INJECTION INTRAVENOUS at 08:50

## 2025-02-20 ASSESSMENT — PAIN SCALES - GENERAL: PAINLEVEL_OUTOF10: 0

## 2025-02-20 NOTE — CARE COORDINATION
Social Work/Discharge Planning:  Received notification patient will discharge.  Deb Peterson arranged for facility to  patient at 5:00pm.  Notified RN and she will update patient and her spouse.  Electronically signed by DESIRE Cheung on 2/20/2025 at 3:29 PM

## 2025-02-20 NOTE — TELEPHONE ENCOUNTER
Patients  calling to let you know patient is at the hospital at [facility] and should discharge to [place] tomorrow. He reports she is having breathing issues with her COPD. He also request her appointment on 3/4 be cancelled, I will do this.

## 2025-02-20 NOTE — DISCHARGE INSTR - COC
Continuity of Care Form    Patient Name: Priya Garcia   :  1940  MRN:  [id number]    Admit date:  2025  Discharge date:  25    Code Status Order: Full Code   Advance Directives:   Advance Care Flowsheet Documentation             Admitting Physician:  Elizabeth Leyva MD  PCP: David Coffey MD    [facility]1-A  Discharging Unit Phone Number: [phone]    Emergency Contact:   Extended Emergency Contact Information  Primary Emergency Contact: George Garcia  Mobile Phone: [phone]  Relation: Spouse  Secondary Emergency Contact: Dragan Garcia  Mobile Phone: [phone]  Relation: Child    Past Surgical History:  Past Surgical History:   Procedure Laterality Date    JOINT REPLACEMENT Right 2019    RIGHT HIP    LEG AMPUTATION BELOW KNEE Left 2017       Immunization History:   Immunization History   Administered Date(s) Administered    COVID-19, MODERNA BLUE border, Primary or Immunocompromised, (age 12y+), IM, 100 mcg/0.5mL 2021, 2021, 2021    Influenza, FLUAD, (age 65 y+), IM, Quadv, 0.5mL 10/13/2022    Influenza, FLUAD, (age 65 y+), IM, Trivalent PF, 0.5mL 2025    Influenza, FLUZONE High Dose (age 65 y+), IM, Quadv, 0.7mL 11/10/2023    Pneumococcal, PCV20, PREVNAR 20, (age 6w+), IM, 0.5mL 12/15/2022    RSV, ABRYSVO, (Pregnant or age 60y+), PF, IM, 0.5mL 11/10/2023    Zoster Recombinant (Shingrix) 2020, 2021       Active Problems:  Patient Active Problem List   Diagnosis Code    Anxiety F41.9    Chronic obstructive pulmonary disease (HCC) J44.9    History of DVT (deep vein thrombosis) Z86.718    Hypertension I10    Agoraphobia F40.00    History of below-knee amputation of left lower extremity (HCC) Z89.512    EKG abnormalities R94.31    Peripheral arterial disease I73.9    Chronic respiratory failure with hypoxia (HCC) J96.11    Pure hypercholesterolemia E78.00    Anemia D64.9

## 2025-02-20 NOTE — DISCHARGE SUMMARY
Physician Discharge Summary  [facility]     Patient ID:  Priya Garcia  [id number]  84 y.o.  1940    Admit date: 2/17/2025    Discharge date: 2/20/25    Admission Diagnoses:   COPD exacerbation (HCC) [J44.1]    Discharge Diagnoses:  Principal Problem:    Acute on chronic respiratory failure with hypoxia and hypercapnia (HCC)  Active Problems:    Anxiety    Chronic obstructive pulmonary disease (HCC)    History of DVT (deep vein thrombosis)    Hypertension    History of below-knee amputation of left lower extremity (HCC)    Peripheral arterial disease    Chronic respiratory failure with hypoxia (HCC)    Anemia  Resolved Problems:    COPD exacerbation (HCC)      Consults: pulmonary/intensive care  Procedures: None    Hospital Course:     Priya Garcia  is a 84 y.o. female patient of David Coffey MD  with a pertinent PMHx of COPD with 45 pack year smoking history but quit 23 years ago,Anemia, GI Bleed 2/2 Gastritis, HTN, Anxiety, H/o DVT, PAD, HLD, Left BKA in 2017 who presented to the ER from home via EMS with chief complaint of increasing shortness of breath over the last 2-3 days. In the ED, blood gases were obtained which showed CO2 retention. Patient was initiated on AVAPS. CXR done in the ED showed GG airspace disease within the right and left lower lobes s/o vascular congestion vs pneumonia. CT-A Pulmonary with contrast showed no PE. RFA was negative. Patient was transferred to the floors with diagnosis of acute exacerbation of COPD. Patient was managed with AVAPS for CO2 retention, Brovana/Pulmicort, Duonebs Q4 for acute exacerbation of COPD. Pulmonology was consulted and advice followed. Patient mentioned feeling better on the AVAPS and mentioned wanting a BiPAP to take home with her, hence was provided. Patient improved over the course of hospital stay. Patient was also found to have iron deficiency anemia, hence initiated on iron supplements.     Patient was

## 2025-02-20 NOTE — PROGRESS NOTES
Pulmonary Critical Care Medicine           PULMONARY  CRITICAL CARE   SERVICE DAILY PROGRESS  NOTE     2025   Hospital LOS:  LOS: 2 days     Impression / Recommendations:    Acute on chronic hypercapnic  respiratory insufficiency secondary to AECOPD leading to CO2 narcosis requiring NIPPV   Currently on home oxygen requirement at 6 L/min, CT scan of the chest reveals chronic findings     -Continue care in [facility] floor  - Continue NIPPV( BiPAP) , serial ABGs, wean supplemental O2 as tolerated   - Check Respiratory viral Panel   - Sputum cultures   - Urine Antigens   - Duonebs  Scheduled   - Continue Pulmicort and perforomist  nebulizations   - Cycle Troponins   - Diuresis - lasix 40 IV  once   - on ceftriaxone and doxycycline , de-escalate as the micro data is available   - systemic steroids IV  , taper after 72 hrs    -Continue the use of incentive spirometer and flutter valve  -Continue - Out of bed to chair, PT OT eval and ambulate as tolerated on supplemental oxygen       Interval History/Event Changes:  Feels better this am   Continues to be 6 lpm , has o2 at home that can go up to 10 lpm   Not using the NIPPV ( BiPAP) , does not have it at home.     Allergies  No Known Allergies    Review of Systems    A pertinent review of systems was performed and was otherwise non-contributory.    Vitals-     /71   Pulse 71   Temp 97.9 °F (36.6 °C) (Oral)   Resp 18   Ht 1.651 m (5' 5\")   Wt 85.3 kg (188 lb 0.8 oz)   SpO2 99%   BMI 31.29 kg/m²    Tmax: Temp (24hrs), Av.8 °F (36.6 °C), Min:97.3 °F (36.3 °C), Max:98.2 °F (36.8 °C)      Hemodynamics:  Cuff:   Systolic (24hrs), Av , Min:70 , Max:140   /Diastolic (24hrs), Av, Min:31, Max:91    Cuff MAP:MAP (mmHg)  Av.2  Min: 59  Max: 112  P:   Pulse  Av.3  Min: 58  Max: 92      Airway:     Observed RR: Resp  Av.4  Min: 18  Max: 20   Observed O2 sats: SpO2  Av.4 %  Min: 76 %  Max: 100 %    No intake or output data in the 24 
     Pulmonary Critical Care Medicine           PULMONARY  CRITICAL CARE   SERVICE DAILY PROGRESS  NOTE     2025   Hospital LOS:  LOS: 3 days     Impression / Recommendations:    Acute on chronic hypercapnic  respiratory insufficiency secondary to AECOPD leading to CO2 narcosis requiring NIPPV   Currently on home oxygen requirement at 6 L/min, CT scan of the chest reveals chronic findings     -Okay from pulmonary standpoint to be discharged home.  Patient has arranged for oxygen at home  -Does not have NIV apparatus at home  - Check Respiratory viral Panel negative  - Sputum cultures negative  - Urine Antigens negative  - Duonebs  Scheduled   -Resume home inhalers  - on ceftriaxone and doxycycline , de-escalate as the micro data is available   -Taper steroids  -Continue the use of incentive spirometer and flutter valve  -Continue - Out of bed to chair, PT OT eval and ambulate as tolerated on supplemental oxygen       Interval History/Event Changes:  Looks and feels better  Wants to go home  On 6 L nasal cannula oxygen, that is her home dose, has oxygen at home  Hemodynamically stable  No new symptoms    Allergies  No Known Allergies    Review of Systems    A pertinent review of systems was performed and was otherwise non-contributory.    Vitals-     BP (!) 118/59   Pulse 78   Temp 98.1 °F (36.7 °C) (Oral)   Resp 22   Ht 1.651 m (5' 5\")   Wt 85.3 kg (188 lb 0.8 oz)   SpO2 100%   BMI 31.29 kg/m²    Tmax: Temp (24hrs), Av.8 °F (36.6 °C), Min:97.7 °F (36.5 °C), Max:98.1 °F (36.7 °C)      Hemodynamics:  Cuff:   Systolic (24hrs), Av , Min:115 , Max:128   /Diastolic (24hrs), Av, Min:53, Max:63    Cuff MAP:MAP (mmHg)  Av.2  Min: 59  Max: 112  P:   Pulse  Av.3  Min: 59  Max: 90      Airway:     Observed RR: Resp  Av.1  Min: 18  Max: 22   Observed O2 sats: SpO2  Av.4 %  Min: 76 %  Max: 100 %      Intake/Output Summary (Last 24 hours) at 2025 1468  Last data filed at 2025 
    [facility]  Progress Note    Chief complaint :  Chief Complaint   Patient presents with    Shortness of Breath    Respiratory Distress     Hx of COPD. EMS put patient on C-PAP, gave (2) duonebs, 125 solumedrol        Subjective:    Overnight, patient was not put on AVAPS during transport from ED to Floors. ABG taken at that time showed pCO2 of 90. Patient was educated on the importance of AVAPS and tolerated it overnight.   No overnight problems.   Patient describes feeling better.   Patient denies chest pain, SOB, nausea, vomiting, bloody stools, fever, chills, changes in urination, changes in BM. Patient is tolerating diet - Clear liquids.  Patient mentions feeling less drowsy than yesterday.  Past medical, surgical, family and social history were reviewed, non-contributory, and unchanged unless otherwise stated.    Review of Systems  Negative unless stated above.    Objective:  BP (!) 145/90   Pulse 78   Temp 98 °F (36.7 °C) (Oral)   Resp 20   Ht 1.651 m (5' 5\")   Wt 65.8 kg (145 lb)   SpO2 98%   BMI 24.13 kg/m²     Physical Exam    Physical Exam  Vitals reviewed.   HENT:      Head: Normocephalic.      Right Ear: Tympanic membrane normal.      Left Ear: Tympanic membrane normal.      Mouth/Throat:      Mouth: Mucous membranes are moist.   Eyes:      Extraocular Movements: Extraocular movements intact.      Pupils: Pupils are equal, round, and reactive to light.   Cardiovascular:      Rate and Rhythm: Normal rate. Rhythm irregular.      Pulses: Normal pulses.      Heart sounds: Normal heart sounds.   Pulmonary:      Breath sounds: Wheezing (B/l diffuse scattered wheezes) present, but less than yesterday.   Abdominal:      Palpations: Abdomen is soft.   Genitourinary:     Comments: deferred  Musculoskeletal:         General: Normal range of motion.      Cervical back: Normal range of motion.      Comments: Left BKA   Skin:     General: Skin is warm.      Capillary Refill: 
    [facility]  Progress Note    Chief complaint :  Chief Complaint   Patient presents with    Shortness of Breath    Respiratory Distress     Hx of COPD. EMS put patient on C-PAP, gave (2) duonebs, 125 solumedrol        Subjective:    No overnight problems.   Patient describes feeling better. Patient denies chest pain, SOB, nausea, vomiting, bloody stools, fever, chills, changes in urination, changes in BM. Patient is tolerating diet.    Patient mentions that she requested the BiPAP to be put on her yesterday night due to dyspnea.  When I asked her today, she said she wanted a BiPAP machine to take home or to facility and she would needed it only for half an night.    AM labs pending.    Past medical, surgical, family and social history were reviewed, non-contributory, and unchanged unless otherwise stated.    Review of Systems  Negative unless stated above    Objective:  BP (!) 119/58   Pulse 90   Temp 97.7 °F (36.5 °C) (Oral)   Resp 18   Ht 1.651 m (5' 5\")   Wt 85.3 kg (188 lb 0.8 oz)   SpO2 98%   BMI 31.29 kg/m²     Physical Exam    Vitals reviewed.   HENT:      Head: Normocephalic.      Right Ear: Tympanic membrane normal.      Left Ear: Tympanic membrane normal.      Mouth/Throat:      Mouth: Mucous membranes are moist.   Eyes:      Extraocular Movements: Extraocular movements intact.      Pupils: Pupils are equal, round, and reactive to light.   Cardiovascular:      Rate and Rhythm: Normal rate. Rhythm irregular.      Pulses: Normal pulses.      Heart sounds: Normal heart sounds.   Pulmonary:      Breath sounds: No wheezing (B/l diffuse scattered wheezes) present  Abdominal:      Palpations: Abdomen is soft.   Genitourinary:     Comments: deferred  Musculoskeletal:         General: Normal range of motion.      Cervical back: Normal range of motion.      Comments: Left BKA   Skin:     General: Skin is warm.      Capillary Refill: Capillary refill takes less than 2 
    [facility]  Progress Note    Chief complaint :  Chief Complaint   Patient presents with    Shortness of Breath    Respiratory Distress     Hx of COPD. EMS put patient on C-PAP, gave (2) duonebs, 125 solumedrol        Subjective:    Overnight, patient had spontaneously converted to A-fib rate between .  Today morning, when I examined the patient patient's rhythm was irregular however heart rate was 82 in the telemetry.  Patient describes feeling better. Patient denies chest pain, SOB, nausea, vomiting, bloody stools, fever, chills, changes in urination, changes in BM. Patient is tolerating diet.    As per nurse, patient was on BiPAP for most of the night however is satting well on 6 L oxygen.    Past medical, surgical, family and social history were reviewed, non-contributory, and unchanged unless otherwise stated.    Review of Systems  Negative unless stated above    Objective:  BP (!) 126/52   Pulse 73   Temp 97.9 °F (36.6 °C) (Oral)   Resp 18   Ht 1.651 m (5' 5\")   Wt 85.3 kg (188 lb 0.8 oz)   SpO2 100%   BMI 31.29 kg/m²     Physical Exam  Vitals reviewed.   HENT:      Head: Normocephalic.      Right Ear: Tympanic membrane normal.      Left Ear: Tympanic membrane normal.      Mouth/Throat:      Mouth: Mucous membranes are moist.   Eyes:      Extraocular Movements: Extraocular movements intact.      Pupils: Pupils are equal, round, and reactive to light.   Cardiovascular:      Rate and Rhythm: Normal rate. Rhythm irregular.      Pulses: Normal pulses.      Heart sounds: Normal heart sounds.   Pulmonary:      Breath sounds: No wheezing (B/l diffuse scattered wheezes) present  Abdominal:      Palpations: Abdomen is soft.   Genitourinary:     Comments: deferred  Musculoskeletal:         General: Normal range of motion.      Cervical back: Normal range of motion.      Comments: Left BKA   Skin:     General: Skin is warm.      Capillary Refill: Capillary refill takes 
   02/17/25 0719   NIV Type   Mode Bilevel   Assessment   BP (!) 142/90   SpO2 100 %   Settings/Measurements   FiO2  (S)  70 %  (weaned post abg results , ED MD aware)       
2 RN skin check done at bedside handoff with RUFINO Burr.   
4 Eyes Skin Assessment     NAME:  Priya Garcia  YOB: 1940  MEDICAL RECORD NUMBER:  [id number]    The patient is being assessed for  Admission    I agree that at least one RN has performed a thorough Head to Toe Skin Assessment on the patient. ALL assessment sites listed below have been assessed.      Areas assessed by both nurses:    Head, Face, Ears, Shoulders, Back, Chest, Arms, Elbows, Hands, Sacrum. Buttock, Coccyx, Ischium, Legs. Feet and Heels, and Under Medical Devices         RLE vascular discoloration  L BKA with prosthetic leg  Bilateral buttocks redness, blanchable  Coccyx redness, blanchable  R heel redness, blanchable, boggy  Bilateral behind ear redness blanchable    Does the Patient have a Wound? No noted wound(s)       Chuck Prevention initiated by RN: Yes  Wound Care Orders initiated by RN: No    Pressure Injury (Stage 3,4, Unstageable, DTI, NWPT, and Complex wounds) if present, place Wound referral order by RN under : No    New Ostomies, if present place, Ostomy referral order under : No     Nurse 1 eSignature: Electronically signed by Andria Sanford RN on 2/17/25 at 10:06 PM EST    **SHARE this note so that the co-signing nurse can place an eSignature**    Nurse 2 eSignature: Electronically signed by Susie Chang RN on 2/17/25 at 10:14 PM EST   
ABG results sent to family medicine resident.  
Database initiated. Patient is A&O comes in from home with . States she uses a walker and wears 6 liters oxygen at baseline. She has left AKA with prosthetic.     
Dr Velazquez is on call for the group, notified via secure text  [facility]  
During [facility]SS[facility], 4 eye skin check completed by Kristen JOY and Andria JOY. No new skin issues.   
Informed  of discharge to [place] at 1700.  
Message sent to [facility] to check discharge.   
Message sent to family medicine resident in regards to diet order per patient request.   
Patient arrived on unit with no report given to this RN, nor opportunity to ask questions. Both Ivs were ripped out with patient placing Ivs in personal belongings bag. Patient actively bleeding. Patient was not noted to be a fall risk, but patient was brought up with prosthetic leg in the chair and not with them in bed. Patient states inability to walk without it and \"not so great\" with it. Appropriate safety measures taken since assessing the patient at this time. Patient is comfortable.  
Patient stated that she would like to go on bipap around 4:30am.  
Physical Therapy  Facility/Department: [address]  Physical Therapy Initial Assessment    Name: Priya Garcia  : 1940  MRN: [id number]  Date of Service: 2025        Patient Diagnosis(es): The encounter diagnosis was COPD exacerbation (HCC).  Past Medical History:  has a past medical history of Anxiety, COPD (chronic obstructive pulmonary disease) (HCC), and Hx of blood clots.  Past Surgical History:  has a past surgical history that includes joint replacement (Right, 2019) and Leg amputation below knee (Left, 2017).      Evaluating Therapist: Yazmin Small PT    Room #:  0441/0441-A  Diagnosis:  COPD exacerbation (HCC) [J44.1]  PMHx/PSHx:  Anxiety, COPD, L BKA  Precautions:  falls, alarm O2      Social:  Pt lives with  in a 1 floor plan 1 small step to enter. Pt ambulates with ww and prothesis Also has wheelchair. Uses home O2.   assists as needed.       Initial Evaluation  Date: 25 Treatment      Short Term/ Long Term   Goals   Was pt agreeable to Eval/treatment? yes     Does pt have pain? No c/o pain     Bed Mobility  Rolling: SBA  Supine to sit: SBA  Sit to supine: SBA  Scooting: SBA  independent   Transfers Sit to stand: CGA  Stand to sit: CGA  Stand pivot: CGA  independent   Ambulation    10 feet x2 with ww with CGA  75 feet with ww with SBA   Stair Negotiation  Ascended and descended  NT   1 small step with ww with CGA   LE strength     3+/5    4/5   balance      fair     AM-PAC Raw score               16/24         Pt is alert and grossly Oriented   LE ROM: WFL  Sensation: intact  Edema: none  Endurance: poor     ASSESSMENT:    Pt displays functional ability as noted in the objective portion of this evaluation.      Patient education  Pt educated on PT objectives    Patient response to education:   Pt verbalized understanding Pt demonstrated skill Pt requires further education in this area   yes           Comments:  Pt short of breath with minimal 
Report given to Breann at [facility].  Informed of 1700 pickup.  Discharge papers faxed to [phone].  
Reviewed patient's recent ABG results which clearly seem to be worsening than prior: pCO2 increased from 69.2-92.9, pH dropped down from 7.37-7.29.    Went to the floor to assess the patient and to speak with nursing.  Per nursing patient was sent from the ED on a high flow nasal cannula rather than the BiPAP which was ordered by the morning team.  Her IV was also pulled out while she was transferred.  Nursing was able to get in the IV access and was able to put her back on the BiPAP.  Since being put on the BiPAP patient has been pulling on the mask setting off the alarm frequently.    Spoke with patient was seen resting in bed.  She was completely awake, alert, oriented to time person and place.  She said that the mask and the straps on her face feel very uncomfortable.  She denied any other complaints at this time.  Discussed with patient the importance of wearing the BiPAP continuously, spoke with nursing to readjust the position of patient in bed and to provide her with extra pillows to support her back.  Patient verbalized understanding and said that she would not pull on her mask from now on.    A/P:    84-year-old female with past medical history of COPD on baseline 6 L of oxygen, 45-pack-year smoking history, GI bleed secondary to gastritis, hypertension, anxiety admitted for acute exacerbation of COPD this morning was evaluated for worsening ABG.  Vitals reviewed  Patient on BiPAP now and will ensure compliance  Repeat ABG around 2 AM      Kelly Silva MD    resident PGY-2  
Spiritual Health History and Assessment/Progress Note  [facility]    Spiritual/Emotional Needs,  ,  ,      Name: Priya Garcia MRN: [id number]    Age: 84 y.o.     Sex: female   Language: English   Yazidism: Yarsani   COPD exacerbation (HCC)     Date: 2/18/2025                           Spiritual Assessment began in [facility] 4S INTERMEDIATE 1        Referral/Consult From: Rounding   Encounter Overview/Reason: Spiritual/Emotional Needs  Service Provided For: Patient    Ignacia, Belief, Meaning:   Patient has beliefs or practices that help with coping during difficult times  Family/Friends No family/friends present      Importance and Influence:  Patient has no beliefs influential to healthcare decision-making identified during this visit  Family/Friends No family/friends present    Community:  Patient feels well-supported. Support system includes: Extended family  Family/Friends No family/friends present    Assessment and Plan of Care:     Patient Interventions include: Facilitated expression of thoughts and feelings  Family/Friends Interventions include: No family/friends present    Patient Plan of Care: Spiritual Care available upon further referral  Family/Friends Plan of Care: No family/friends present    Electronically signed by Chaplain Janet on 2/18/2025 at 7:22 PM    
Spoke with pt's , George, updated on pt and plan of care.   
plan of care. Demonstrated Fair understanding.    Eval Complexity: Low    Time In: 1036  Time Out: 1059  Total Treatment Time: 8 minutes      Minutes Units   OT Eval Low 12306 15 1   OT Eval Medium 34865     OT Eval High 99659     OT Re-Eval 54644     Therapeutic Ex 37738     Therapeutic Activities 45452     ADL/Self Care 73481 8 1   Orthotic Management 60242     Neuro Re-Ed 18380     Non-Billable Time N/A ---     Evaluation time includes thorough review of current medical information, gathering information on past medical history/social history and prior level of function, completion of standardized testing/informal observation of tasks, assessment of data, and education on plan of care and goals.    Krystyna Osborn, OTR/L  License Number: [id number]

## 2025-02-20 NOTE — PLAN OF CARE
Problem: ABCDS Injury Assessment  Goal: Absence of physical injury  2/19/2025 1037 by Merlene Phelan  Outcome: Progressing     Problem: Discharge Planning  Goal: Discharge to home or other facility with appropriate resources  2/19/2025 1037 by Merlene Phelan  Outcome: Progressing     Problem: Safety - Adult  Goal: Free from fall injury  2/19/2025 1037 by Merlene Phelan  Outcome: Progressing     Problem: Neurosensory - Adult  Goal: Achieves stable or improved neurological status  2/19/2025 1037 by Merlene Phelan  Outcome: Progressing     Problem: Neurosensory - Adult  Goal: Achieves maximal functionality and self care  2/19/2025 1037 by Merlene Phelan  Outcome: Progressing     Problem: Respiratory - Adult  Goal: Achieves optimal ventilation and oxygenation  2/19/2025 1037 by Merlene Phelan  Outcome: Progressing     Problem: Skin/Tissue Integrity - Adult  Goal: Skin integrity remains intact  Description: 1.  Monitor for areas of redness and/or skin breakdown  2.  Assess vascular access sites hourly  3.  Every 4-6 hours minimum:  Change oxygen saturation probe site  4.  Every 4-6 hours:  If on nasal continuous positive airway pressure, respiratory therapy assess nares and determine need for appliance change or resting period  2/19/2025 1037 by Merlene Phelan  Outcome: Progressing     Problem: Skin/Tissue Integrity - Adult  Goal: Oral mucous membranes remain intact  2/19/2025 1037 by Merlene Phelan  Outcome: Progressing     Problem: Anxiety  Goal: Will report anxiety at manageable levels  Description: INTERVENTIONS:  1. Administer medication as ordered  2. Teach and rehearse alternative coping skills  3. Provide emotional support with 1:1 interaction with staff  2/19/2025 1037 by Merlene Phelan  Outcome: Progressing     Problem: Coping  Goal: Pt/Family able to verbalize concerns and demonstrate effective coping strategies  Description: INTERVENTIONS:  1. Assist patient/family to identify coping skills, available support 
  Problem: ABCDS Injury Assessment  Goal: Absence of physical injury  Outcome: Progressing     Problem: Discharge Planning  Goal: Discharge to home or other facility with appropriate resources  Outcome: Progressing     Problem: Safety - Adult  Goal: Free from fall injury  Outcome: Progressing     Problem: Neurosensory - Adult  Goal: Achieves stable or improved neurological status  Outcome: Progressing  Goal: Achieves maximal functionality and self care  Outcome: Progressing     Problem: Respiratory - Adult  Goal: Achieves optimal ventilation and oxygenation  Outcome: Progressing     Problem: Skin/Tissue Integrity - Adult  Goal: Skin integrity remains intact  Description: 1.  Monitor for areas of redness and/or skin breakdown  2.  Assess vascular access sites hourly  3.  Every 4-6 hours minimum:  Change oxygen saturation probe site  4.  Every 4-6 hours:  If on nasal continuous positive airway pressure, respiratory therapy assess nares and determine need for appliance change or resting period  Outcome: Progressing  Goal: Oral mucous membranes remain intact  Outcome: Progressing     Problem: Anxiety  Goal: Will report anxiety at manageable levels  Description: INTERVENTIONS:  1. Administer medication as ordered  2. Teach and rehearse alternative coping skills  3. Provide emotional support with 1:1 interaction with staff  Outcome: Progressing     Problem: Coping  Goal: Pt/Family able to verbalize concerns and demonstrate effective coping strategies  Description: INTERVENTIONS:  1. Assist patient/family to identify coping skills, available support systems and cultural and spiritual values  2. Provide emotional support, including active listening and acknowledgement of concerns of patient and caregivers  3. Reduce environmental stimuli, as able  4. Instruct patient/family in relaxation techniques, as appropriate  5. Assess for spiritual pain/suffering and initiate Spiritual Care, Psychosocial Clinical Specialist consults 
  Problem: ABCDS Injury Assessment  Goal: Absence of physical injury  Outcome: Progressing     Problem: Discharge Planning  Goal: Discharge to home or other facility with appropriate resources  Outcome: Progressing     Problem: Safety - Adult  Goal: Free from fall injury  Outcome: Progressing     Problem: Neurosensory - Adult  Goal: Achieves stable or improved neurological status  Outcome: Progressing  Goal: Achieves maximal functionality and self care  Outcome: Progressing     Problem: Respiratory - Adult  Goal: Achieves optimal ventilation and oxygenation  Outcome: Progressing     Problem: Skin/Tissue Integrity - Adult  Goal: Skin integrity remains intact  Description: 1.  Monitor for areas of redness and/or skin breakdown  2.  Assess vascular access sites hourly  3.  Every 4-6 hours minimum:  Change oxygen saturation probe site  4.  Every 4-6 hours:  If on nasal continuous positive airway pressure, respiratory therapy assess nares and determine need for appliance change or resting period  Outcome: Progressing  Goal: Oral mucous membranes remain intact  Outcome: Progressing     Problem: Anxiety  Goal: Will report anxiety at manageable levels  Description: INTERVENTIONS:  1. Administer medication as ordered  2. Teach and rehearse alternative coping skills  3. Provide emotional support with 1:1 interaction with staff  Outcome: Progressing     Problem: Coping  Goal: Pt/Family able to verbalize concerns and demonstrate effective coping strategies  Description: INTERVENTIONS:  1. Assist patient/family to identify coping skills, available support systems and cultural and spiritual values  2. Provide emotional support, including active listening and acknowledgement of concerns of patient and caregivers  3. Reduce environmental stimuli, as able  4. Instruct patient/family in relaxation techniques, as appropriate  5. Assess for spiritual pain/suffering and initiate Spiritual Care, Psychosocial Clinical Specialist consults 
spiritual pain/suffering and initiate Spiritual Care, Psychosocial Clinical Specialist consults as needed  2/18/2025 1012 by Kristen Stiles RN  Outcome: Progressing     Problem: Confusion  Goal: Confusion, delirium, dementia, or psychosis is improved or at baseline  Description: INTERVENTIONS:  1. Assess for possible contributors to thought disturbance, including medications, impaired vision or hearing, underlying metabolic abnormalities, dehydration, psychiatric diagnoses, and notify attending LIP  2. [place] high risk fall precautions, as indicated  3. Provide frequent short contacts to provide reality reorientation, refocusing and direction  4. Decrease environmental stimuli, including noise as appropriate  5. Monitor and intervene to maintain adequate nutrition, hydration, elimination, sleep and activity  6. If unable to ensure safety without constant attention obtain sitter and review sitter guidelines with assigned personnel  7. Initiate Psychosocial CNS and Spiritual Care consult, as indicated  2/18/2025 1012 by Kristen Stiles RN  Outcome: Progressing     Problem: Behavior  Goal: Pt/Family maintain appropriate behavior and adhere to behavioral management agreement, if implemented  Description: INTERVENTIONS:  1. Assess patient/family's coping skills and  non-compliant behavior (including use of illegal substances)  2. Notify security of behavior or suspected illegal substances which indicate the need for search of the family and/or belongings  3. Encourage verbalization of thoughts and concerns in a socially appropriate manner  4. Utilize positive, consistent limit setting strategies supporting safety of patient, staff and others  5. Encourage participation in the decision making process about the behavioral management agreement  6. If a visitor's behavior poses a threat to safety call refer to organization policy.  7. Initiate consult with , Psychosocial CNS, Spiritual Care as

## 2025-02-20 NOTE — CARE COORDINATION
Social Work/Discharge Planning:  Deb Peterson states pre-cert is still pending.  Electronic N-17 in epic, 12881 completed and transport form in soft chart.  Will continue to follow and wait for pre-cert.  Electronically signed by DESIRE Cheung on 2/20/2025 at 11:39 AM    Addendum:  Pre-cert approved to [facility].  Updated RN.  Electronically signed by DESIRE Cheung on 2/20/2025 at 12:10 PM    Addendum:  Updated patient.  Bipap order noted.  Deb Peterson confirmed facility order bipap yesterday.  Electronically signed by DESIRE Cheung on 2/20/2025 at 1:09 PM

## 2025-02-21 LAB
P E INTERPRETATION, U: NORMAL
PATHOLOGIST: NORMAL
SPECIMEN TYPE: NORMAL

## 2025-02-22 LAB
MICROORGANISM SPEC CULT: NORMAL
MICROORGANISM SPEC CULT: NORMAL
SERVICE CMNT-IMP: NORMAL
SERVICE CMNT-IMP: NORMAL
SPECIMEN DESCRIPTION: NORMAL
SPECIMEN DESCRIPTION: NORMAL

## 2025-02-25 PROBLEM — N17.9 AKI (ACUTE KIDNEY INJURY): Status: ACTIVE | Noted: 2025-02-25

## 2025-02-25 PROBLEM — J44.1 COPD EXACERBATION (HCC): Status: ACTIVE | Noted: 2025-02-25

## 2025-02-25 LAB
ALBUMIN SERPL-MCNC: 3.3 G/DL (ref 3.5–4.7)
ALPHA1 GLOB SERPL ELPH-MCNC: 0.3 G/DL (ref 0.2–0.4)
ALPHA2 GLOB SERPL ELPH-MCNC: 0.8 G/DL (ref 0.5–1)
B-GLOBULIN SERPL ELPH-MCNC: 1 G/DL (ref 0.8–1.3)
GAMMA GLOB SERPL ELPH-MCNC: 0.9 G/DL (ref 0.7–1.6)
PATHOLOGIST: ABNORMAL
PROT PATTERN SERPL ELPH-IMP: ABNORMAL
PROT SERPL-MCNC: 6.3 G/DL (ref 6.4–8.3)

## 2025-02-25 NOTE — PROGRESS NOTES
Date: 2/25/2025    Time: 2:19 PM    Patient Placed On BIPAP/CPAP/ Non-Invasive Ventilation?  Yes    If no must comment.  Facial area red/color change? No           If YES are Blister/Lesion present?No   If yes must notify nursing staff  BIPAP/CPAP skin barrier?  Yes    Skin barrier type:mepilexlite       Comments:        Niyah Corea RCP

## 2025-02-25 NOTE — H&P
mmol/L    Potassium 4.7 3.5 - 5.0 mmol/L    Chloride 104 98 - 107 mmol/L    CO2 35 (H) 22 - 29 mmol/L    Anion Gap 8 7 - 16 mmol/L    Glucose 169 (H) 74 - 99 mg/dL    BUN 50 (H) 6 - 23 mg/dL    Creatinine 1.4 (H) 0.50 - 1.00 mg/dL    Est, Glom Filt Rate 38 (L) >60 mL/min/1.73m2    Calcium 9.5 8.6 - 10.2 mg/dL    Total Protein 6.6 6.4 - 8.3 g/dL    Albumin 3.5 3.5 - 5.2 g/dL    Total Bilirubin 0.2 0.0 - 1.2 mg/dL    Alkaline Phosphatase 86 35 - 104 U/L    ALT 14 0 - 32 U/L    AST 10 0 - 31 U/L   Troponin    Collection Time: 02/25/25  2:26 PM   Result Value Ref Range    Troponin, High Sensitivity 18 (H) 0 - 9 ng/L   Brain Natriuretic Peptide    Collection Time: 02/25/25  2:26 PM   Result Value Ref Range    NT Pro-BNP 6,443 (H) 0 - 450 pg/mL   Lactate, Sepsis    Collection Time: 02/25/25  2:26 PM   Result Value Ref Range    Lactic Acid, Sepsis 0.8 0.5 - 1.9 mmol/L   Culture, Blood 2    Collection Time: 02/25/25  2:36 PM    Specimen: Blood   Result Value Ref Range    Specimen Description .BLOOD     Special Requests          Culture NO GROWTH <24 HRS    EKG 12 Lead (SOB)    Collection Time: 02/25/25  2:46 PM   Result Value Ref Range    Ventricular Rate 100 BPM    Atrial Rate 416 BPM    QRS Duration 80 ms    Q-T Interval 316 ms    QTc Calculation (Bazett) 407 ms    R Axis 31 degrees    T Axis 173 degrees   Troponin    Collection Time: 02/25/25  4:23 PM   Result Value Ref Range    Troponin, High Sensitivity 14 (H) 0 - 9 ng/L   Blood Gas, Arterial    Collection Time: 02/25/25  4:29 PM   Result Value Ref Range    Date Analyzed 20250225     Time Analyzed 1636     Source: Blood Arterial     pH, Blood Gas 7.335 (L) 7.350 - 7.450    PCO2 60.3 (H) 35.0 - 45.0 mmHg    PO2 182.4 (H) 75.0 - 100.0 mmHg    HCO3 31.4 (H) 22.0 - 26.0 mmol/L    B.E. 4.8 (H) -3.0 - 3.0 mmol/L    O2 Sat 99.8 (H) 92.0 - 98.5 %    O2Hb 98.8 (H) 94.0 - 97.0 %    COHb 0.7 0.0 - 1.5 %    MetHb 0.3 0.0 - 1.5 %    O2 Content 11.3 mL/dL    HHb 0.2 0.0 - 5.0 %

## 2025-02-25 NOTE — ED PROVIDER NOTES
daily 90 tablet 3    sertraline (ZOLOFT) 100 MG tablet Take 1 tablet by mouth nightly 90 tablet 3    fluticasone-umeclidin-vilant (TRELEGY ELLIPTA) 200-62.5-25 MCG/ACT AEPB inhaler Inhale 1 puff into the lungs daily 3 each 3    amLODIPine (NORVASC) 5 MG tablet Take 1 tablet by mouth daily 90 tablet 3    ammonium lactate (LAC-HYDRIN) 12 % lotion Apply topically twice daily 400 g 2    cetirizine (ZYRTEC) 10 MG tablet Take 1 tablet by mouth daily      aspirin 81 MG chewable tablet Take 1 tablet by mouth daily      Cholecalciferol (VITAMIN D) 50 MCG (2000 UT) CAPS capsule Take by mouth daily      OXYGEN Inhale into the lungs 6 LITERS      ferrous sulfate (IRON 325) 325 (65 Fe) MG tablet Take 1 tablet by mouth Twice a Week      docusate sodium (COLACE, DULCOLAX) 100 MG CAPS Take 100 mg by mouth 2 times daily as needed for Constipation      polyethylene glycol (GLYCOLAX) 17 g packet Take 1 packet by mouth daily as needed for Constipation      ALPRAZolam (XANAX) 0.25 MG tablet TAKE 1 TAB BY MOUTH NIGHTLY. MAY TAKE AN ADDITIONAL TAB AS NEEDED FOR LEAVING THE HOUSE (Patient taking differently: Take 1 tablet by mouth nightly as needed for Anxiety. TAKE 1 TAB BY MOUTH NIGHTLY. MAY TAKE AN ADDITIONAL TAB AS NEEDED FOR LEAVING THE HOUSE) 60 tablet 0    senna (SENOKOT) 8.6 MG tablet Take 1 tablet by mouth 2 times daily If needed for constipation 60 tablet 11    albuterol sulfate HFA (VENTOLIN HFA) 108 (90 Base) MCG/ACT inhaler Inhale 2 puffs into the lungs 4 times daily as needed for Wheezing 3 each 1    Omega-3 Fatty Acids (FISH OIL PO) Take by mouth daily           Previous Echo reviewed by me:  No results found for: \"LVEF\", \"LVEFMODE\"     No results found for this or any previous visit.       Controlled Substance Monitored by me:   Acute and Chronic Pain Monitoring:        No data to display                   ---------------------------------------------------PHYSICAL  Handed report to Dr/RN    Troponin   Result Value Ref Range    Troponin, High Sensitivity 14 (H) 0 - 9 ng/L   Blood Gas, Arterial   Result Value Ref Range    Date Analyzed 20250225     Time Analyzed 1636     Source: Blood Arterial     pH, Blood Gas 7.335 (L) 7.350 - 7.450    PCO2 60.3 (H) 35.0 - 45.0 mmHg    PO2 182.4 (H) 75.0 - 100.0 mmHg    HCO3 31.4 (H) 22.0 - 26.0 mmol/L    B.E. 4.8 (H) -3.0 - 3.0 mmol/L    O2 Sat 99.8 (H) 92.0 - 98.5 %    O2Hb 98.8 (H) 94.0 - 97.0 %    COHb 0.7 0.0 - 1.5 %    MetHb 0.3 0.0 - 1.5 %    O2 Content 11.3 mL/dL    HHb 0.2 0.0 - 5.0 %    tHb (est) 7.8 (L) 11.5 - 16.5 g/dL    Mode AVAPS     Rr Mechanical 20.0 b/min    Vt Mechanical 450.0 mL    Peep/Cpap 6.0 cmH2O    Date Of Collection 20250225     Time Collected 1629     Pt Temp 37.0 C     ID [id number]     Lab [id number]     Critical(s) Notified . No Critical Values    EKG 12 Lead (SOB)   Result Value Ref Range    Ventricular Rate 100 BPM    Atrial Rate 416 BPM    QRS Duration 80 ms    Q-T Interval 316 ms    QTc Calculation (Bazett) 407 ms    R Axis 31 degrees    T Axis 173 degrees       RADIOLOGY:   Non-plain film images such as CT, Ultrasound and MRI are read by the radiologist. Plain radiographic images are visualized and preliminarily interpreted by the ED Provider with the below findings:    CXR with bibasilar infiltrates concerning for pneumonia.     Interpretation per the Radiologist below, if available at the time of this note:    CTA PULMONARY W CONTRAST   Final Result      1.  New consolidation within the posteromedial, inferior left lower lobe   consistent with pneumonia.  There is a very small left pleural effusion.   Some additional patchy opacity within the posterior lingula is more   indeterminate but could represent additional pneumonitis.      2.  Emphysematous change within the lungs with areas of scattered lung   scarring.      3.  Stable cardiomegaly.      4.  Otherwise unchanged.         XR CHEST PORTABLE   Final

## 2025-02-25 NOTE — ED NOTES
Radiology Procedure Waiver   Name: Priya Garcia  : 1940  MRN: [id number]    Date:  25    Time: 5:52 PM EST    Benefits of immediately proceeding with Radiology exam(s) without pre-testing outweigh the risks or are not indicated as specified below and therefore the following is/are being waived:    [] Pregnancy test   [] Patients LMP on-time and regular.   [] Patient had Tubal Ligation or has other Contraception Device.   [] Patient  is Menopausal or Premenarcheal.    [] Patient had Full or Partial Hysterectomy.    [] Protocol for Iodine allergy    [] MRI Questionnaire     [x] BUN/Creatinine   [] Patient age w/no hx of renal dysfunction.   [] Patient on Dialysis.   [] Recent Normal Labs.  Electronically signed by Dwight Vasquez MD on 25 at 5:52 PM EST

## 2025-02-26 ENCOUNTER — TELEPHONE (OUTPATIENT)
Dept: FAMILY MEDICINE CLINIC | Age: 85
End: 2025-02-26

## 2025-02-26 PROBLEM — J18.9 HCAP (HEALTHCARE-ASSOCIATED PNEUMONIA): Status: ACTIVE | Noted: 2025-02-26

## 2025-02-26 NOTE — ED NOTES
ED to Inpatient Handoff Report    Notified abbie that electronic handoff available and patient ready for transport to room 407.    Safety Risks: None identified    Patient in Restraints: no    Constant Observer or Patient : no    Telemetry Monitoring Ordered: Yes          Order to transfer to unit without monitor: NO        Deterioration Index: (!) 56.05    Vitals:    02/25/25 2120 02/25/25 2121 02/25/25 2222 02/25/25 2231   BP: 105/77      Pulse:  (!) 106 (!) 101    Resp: 27 27 (!) 31 26   Temp:       TempSrc:       SpO2:       Weight:       Height:           Opportunity for questions and clarification was provided.

## 2025-02-26 NOTE — TELEPHONE ENCOUNTER
called to let you know that Priya has been admitted to [facility] for breathing problems and pneumonia.

## 2025-02-26 NOTE — CARE COORDINATION
Social Work    Chart reviewed. Mrs. Garcia was readmitted to [facility] from [facility] due to respiratory distress and was placed on a nonrebreathing at 10 L. Social service met with Mrs. Garcia and her  George, as well as briefly spoke with daughter-in-law Lacey (RN at [employer] [facility]), and confirmed plans to return to snf. Deb at [facility] advised that Mrs. Garcia is not a bedhold but can return with PT/OT and insurance authorization.  (N-17, ambulance form in chart)    Electronically signed by DESIRE Saenz on 2/26/2025 at 1:30 PM

## 2025-02-26 NOTE — PROGRESS NOTES
[facility]  Progress Note    Chief complaint :  Chief Complaint   Patient presents with    Respiratory Distress     [place] nonrebreather at 10L. Normally wears 4-5 lpm. Lethargic,        Subjective:    No overnight problems.   Patient describes feeling  lazy . Patient denies chest pain, nausea, vomiting, bloody stools, fever, chills, changes in urination, changes in BM. Patient is tolerating diet.  Patient mentions that she feels better than when she came in.  As per nurse, patient was compliant with AVAPS through most of the night.  S/p 1 unit transfusion yesterday  Past medical, surgical, family and social history were reviewed, non-contributory, and unchanged unless otherwise stated.    Review of Systems  Negative unless stated above    Objective:  /67   Pulse 74   Temp 97.5 °F (36.4 °C) (Oral)   Resp 20   Ht 1.651 m (5' 5\")   Wt 85.3 kg (188 lb)   SpO2 97%   BMI 31.28 kg/m²     Physical Exam    Vitals reviewed.   HENT:      Head: Normocephalic.      Right Ear: Tympanic membrane normal.      Left Ear: Tympanic membrane normal.      Mouth/Throat:      Mouth: Mucous membranes are moist.   Eyes:      Extraocular Movements: Extraocular movements intact.      Pupils: Pupils are equal, round, and reactive to light.   Cardiovascular:      Rate and Rhythm: Normal rate. Rhythm irregular.      Pulses: Normal pulses.      Heart sounds: Normal heart sounds.   Pulmonary:      Breath sounds: Wheezing (B/l diffuse scattered wheezes) present. B/l crepitations present.   Abdominal:      Palpations: Abdomen is soft.   Genitourinary:     Comments: deferred  Musculoskeletal:         General: Normal range of motion.      Cervical back: Normal range of motion.      Comments: Left BKA   Skin:     General: Skin is warm.      Capillary Refill: Capillary refill takes less than 2 seconds.   Neurological:      General: No focal deficit present.      Mental Status: She is alert  an echocardiogram in 2016 demonstrating a normal-sized left ventricular chamber with normal left ventricular systolic function and no significant valvular abnormalities.   Continue home medication - amlodipine 5 mg once a day  Hold home Cozaar 50 mg for now. Will plan to initiate once GILDA is resolved.  Hold home ASA for now.      GERD   Protonix 40mg IV OD     Anxiety/Depression   Continue home medication - Aripiprazole 5mg @ bed time, Zoloft 100mg nightly, Xanax 0.25mg nightly PRN, Zyrtec 10mg      Pain Control:  Tylenol 650 mg Q6HR PRN for mild pain  DVT ppx: PCDs  GI ppx: Protonix 40 mg daily  Code Status: Full  Diet: ADULT DIET; Regular; 5 carb choices (75 gm/meal)  ADULT ORAL NUTRITION SUPPLEMENT; Breakfast, Dinner; Diabetic Oral Supplement    Palliative care consulted, appreciate recs.     Disposition: Discharge to pending clinical course    Severiano Webber MD   Family Medicine Resident PGY-1  02/27/25   6:33 AM

## 2025-02-26 NOTE — ACP (ADVANCE CARE PLANNING)
Advance Care Planning   Healthcare Decision Maker:    Primary Decision Maker: George Garcia - Spouse - [phone]    Supplemental (Other) Decision Maker: JoseDragan - Child - [phone]    Supplemental (Other) Decision Maker: Lacey Garcia - Daughter-in-Law - [phone]    Click here to complete Healthcare Decision Makers including selection of the Healthcare Decision Maker Relationship (ie \"Primary\").

## 2025-02-26 NOTE — PROGRESS NOTES
Comprehensive Nutrition Assessment    Type and Reason for Visit:  Initial, Positive nutrition screen    Nutrition Recommendations/Plan:   Continue current diet  Start diabetic ONS BID  Will continue to monitor while inpatient     Malnutrition Assessment:  Malnutrition Status:  At risk for malnutrition (02/26/25 5506)    Context:  Chronic Illness     Findings of the 6 clinical characteristics of malnutrition:  Energy Intake:  Mild decrease in energy intake  Weight Loss:  Unable to assess (? accuracy of CBW)     Body Fat Loss:  Unable to assess     Muscle Mass Loss:  Unable to assess    Fluid Accumulation:  No fluid accumulation     Strength:  Not Performed    Nutrition Assessment:    Pt w/ COPD exacerbation; acute on chronic hypercapnic resp failure; HCAP. Hx COPD, L BKA. Will add diabetic ONS BID to promote protein/energy intake d/t decreased appetite. Will continue to monitor.    Nutrition Related Findings:    A&O x4, dentures, abd WDL, RLE trace edema, I/O WDL, gluc 162, steroid Wound Type: None       Current Nutrition Intake & Therapies:    Average Meal Intake: Unable to assess  Average Supplements Intake: None Ordered  ADULT DIET; Regular; 5 carb choices (75 gm/meal)  ADULT ORAL NUTRITION SUPPLEMENT; Breakfast, Dinner; Diabetic Oral Supplement    Anthropometric Measures:  Height: 165.1 cm (5' 5\")  Ideal Body Weight (IBW): 125 lbs (57 kg)       Current Body Weight: 85.3 kg (188 lb) (2/25 estimated- UTO CBW. ?accuracy), 150.4 % IBW. Weight Source: Other  Current BMI (kg/m2): 31.3  Usual Body Weight: 68.5 kg (151 lb) (11/27/24 OV)     % Weight Change (Calculated): 24.5  Weight Adjustment For: Amputation  Total Adjusted Percentage (Calculated): 5.9  Adjusted Ideal Body Weight (lbs) (Calculated): 117.6 lbs  Adjusted Ideal Body Weight (kg) (Calculated): 53.45 kg  Adjusted % Ideal Body Weight (Calculated): 159.9  Adjusted BMI (kg/m2) (Calculated): 33.1  BMI Categories: Obese Class 1 (BMI 30.0-34.9)    Estimated  Daily Nutrient Needs:  Energy Requirements Based On: Kcal/kg  Weight Used for Energy Requirements: Usual  Energy (kcal/day): 5603-1526  Weight Used for Protein Requirements: Adjusted  Protein (g/day): 85-95  Method Used for Fluid Requirements: 1 ml/kcal  Fluid (ml/day): 2470-4517 ml    Nutrition Diagnosis:   Inadequate protein-energy intake related to decreased appetite as evidenced by poor intake prior to admission    Nutrition Interventions:   Food and/or Nutrient Delivery: Continue Current Diet, Start Oral Nutrition Supplement  Nutrition Education/Counseling: No recommendation at this time  Coordination of Nutrition Care: Continue to monitor while inpatient       Goals:  Goals: PO intake 50% or greater, by next RD assessment  Type of Goal: New goal  Previous Goal Met: New Goal    Nutrition Monitoring and Evaluation:   Behavioral-Environmental Outcomes: None Identified  Food/Nutrient Intake Outcomes: Food and Nutrient Intake, Supplement Intake  Physical Signs/Symptoms Outcomes: Biochemical Data, GI Status, Fluid Status or Edema, Nutrition Focused Physical Findings, Skin, Weight    Discharge Planning:    Continue current diet     DAVID DIEZ MPH, RD, LD  Contact: x [phone]

## 2025-02-26 NOTE — PROGRESS NOTES
[facility]  Progress Note    Chief complaint :  Chief Complaint   Patient presents with    Respiratory Distress     Came from [facility] on nonrebreather at 10L. Normally wears 4-5 lpm. Lethargic,        Subjective:    No overnight problems.   Patient describes feeling slightly better.   Patient denies chest pain, nausea, vomiting, bloody stools, fever, chills, changes in urination, changes in BM. Patient is NPO.  During my examination, the AVAPS mask was removed and the patient's oxygen saturation decreased to 78 % on 6L oxygen and the patient became increasingly dyspneic.   She was more alert and oriented than yesterday.  During the conversation of bipap compliance, she mentioned that the bipap made her anxious and that was the reason she was not taking it properly at the facility. I educated her on the importance of BiPAP and its role in helping her CO2 retention, patient verbalized understanding.   As per nurse, patient wore the AVAPS mask through the night.     Past medical, surgical, family and social history were reviewed, non-contributory, and unchanged unless otherwise stated.    Review of Systems  Negative unless stated above    Objective:  BP (!) 117/51   Pulse 99   Temp 98.2 °F (36.8 °C) (Infrared)   Resp 23   Ht 1.651 m (5' 5\")   Wt 85.3 kg (188 lb)   SpO2 99%   BMI 31.28 kg/m²     Physical Exam    Vitals reviewed.   HENT:      Head: Normocephalic.      Right Ear: Tympanic membrane normal.      Left Ear: Tympanic membrane normal.      Mouth/Throat:      Mouth: Mucous membranes are moist.   Eyes:      Extraocular Movements: Extraocular movements intact.      Pupils: Pupils are equal, round, and reactive to light.   Cardiovascular:      Rate and Rhythm: Normal rate. Rhythm irregular.      Pulses: Normal pulses.      Heart sounds: Normal heart sounds.   Pulmonary:      Breath sounds: Wheezing (B/l diffuse scattered wheezes) present. B/l crepitations present.

## 2025-02-26 NOTE — CONSENT
Informed Consent for Blood Component Transfusion Note    I have discussed with the patient and  the rationale for blood component transfusion; its benefits in treating or preventing fatigue, organ damage, or death; and its risk which includes mild transfusion reactions, rare risk of blood borne infection, or more serious but rare reactions. I have discussed the alternatives to transfusion, including the risk and consequences of not receiving transfusion. The patient and  had an opportunity to ask questions and had agreed to proceed with transfusion of blood components.    Electronically signed by Severiano Webber MD on 2/26/25 at 1:55 PM EST

## 2025-02-26 NOTE — PROGRESS NOTES
Pharmacy Consultation Note  (Antibiotic Dosing and Monitoring)    Initial consult date: 2/26/2025  Consulting physician/provider: Dr. Nixon Orozco  Drug: Vancomycin  Indication: Pneumonia    Note vancomycin discontinued. Clinical pharmacy will sign-off. Please re-consult if we can be of further assistance.    Andrew Clay RPH, PharmD, BCCCP  2/26/2025  12:51 PM    SEB: [phone]  SEY: [phone]  SJW: [phone]

## 2025-02-26 NOTE — PROGRESS NOTES
Patient refusing bipap at this time, educated on importance, still adamantly refusing. Placed on high flow nasal cannula.

## 2025-02-26 NOTE — CONSULTS
[facility]  Department of Pulmonary, Critical Care and Sleep Medicine  Department of Internal Medicine    Pulmonary/Critical Care Consult Note    CHIEF COMPLAINT: Acute Respiratory Failure     ISSUES:    Healthcare  Associated Pneumonia  COPD exacerbation (HCC)  Acute on Chronic Hypercapneic Respiratory Failure   Severe Emphysema  Ceftriaxone / Azithro / bronchodilators   Change antibiotics to Cefepime / Vanco (due to HAP)  Doing poorly  Worsening pH off BiPAP  Refusing to wear it  Encouraged compliance  Alternative is that pt is going to get intubated    Goals of Care 2/26/2025   Reviewed with pt and then   Pt does not want machines / heroic measures   reiterates pt would not want to be intubated  Therefore pt is a DNR/DNI - order placed    Encourage use of BiPAP  If either refuses or fails - will focus on comfort    Stop Diamox - will remove compensatory metabolic alkalosis that is buffering Chronic Hypercapneic Respiratory Failure and worsen breathing (increase ventilatory need)       Anxiety  Agoraphobia  Not particularly anxious at this time  Xanax PRN    GILDA (acute kidney injury)  Secondary to above   Improved    History of DVT (deep vein thrombosis)  Hypertension  Peripheral arterial disease  Anemia  Comorbidities            CODE STATUS: Limited DNR/ DNI  __________________________________________________    Referring MD: Mary Webber, Severiano Javier MD     Primary Pulmonologist: Dr. Renetta Manzano     Reason for Consult: Acute Respiratory Failure       HISTORY OF PRESENT ILLNESS:   Priya Garcia is a 84 y.o. female with hx of  COPD,Anemia, GI Bleed, HTN, Anxiety, H/o DVT,PAD, HLD, Left BKA,  presents with COPD exacerbation and Acute Respiratory Failure.    Pt states increase SOB with cough productive of brown sputum over last few days. Denies Fever, Chills or Sweats   Was recently discharged from hospital (2/20) and

## 2025-02-26 NOTE — DISCHARGE INSTR - COC
Continuity of Care Form    Patient Name: Priya Garcia   :  1940  MRN:  [id number]    Admit date:  2025  Discharge date:  3/5/25    Code Status Order: Limited   Advance Directives:   Advance Care Flowsheet Documentation             Admitting Physician:  Himanshu Crouch MD  PCP: David Coffey MD    Discharging Nurse: KYLE  Discharging Hospital Unit/Room#: 0407/0407-A  Discharging Unit Phone Number: [phone]    Emergency Contact:   Extended Emergency Contact Information  Primary Emergency Contact: George Garcia  Mobile Phone: [phone]  Relation: Spouse  Secondary Emergency Contact: Dragan Garcia  Mobile Phone: [phone]  Relation: Child    Past Surgical History:  Past Surgical History:   Procedure Laterality Date    JOINT REPLACEMENT Right 2019    RIGHT HIP    LEG AMPUTATION BELOW KNEE Left 2017       Immunization History:   Immunization History   Administered Date(s) Administered    COVID-19, MODERNA BLUE border, Primary or Immunocompromised, (age 12y+), IM, 100 mcg/0.5mL 2021, 2021, 2021    Influenza, FLUAD, (age 65 y+), IM, Quadv, 0.5mL 10/13/2022    Influenza, FLUAD, (age 65 y+), IM, Trivalent PF, 0.5mL 2025    Influenza, FLUZONE High Dose (age 65 y+), IM, Quadv, 0.7mL 11/10/2023    Pneumococcal, PCV20, PREVNAR 20, (age 6w+), IM, 0.5mL 12/15/2022    RSV, ABRYSVO, (Pregnant or age 60y+), PF, IM, 0.5mL 11/10/2023    Zoster Recombinant (Shingrix) 2020, 2021       Active Problems:  Patient Active Problem List   Diagnosis Code    Anxiety F41.9    Chronic obstructive pulmonary disease (HCC) J44.9    History of DVT (deep vein thrombosis) Z86.718    Hypertension I10    Agoraphobia F40.00    History of below-knee amputation of left lower extremity (HCC) Z89.512    EKG abnormalities R94.31    Peripheral arterial disease I73.9    Chronic respiratory failure with hypoxia ([facility]) J96.11    Pure hypercholesterolemia E78.00    Anemia D64.9    Coronary  atherosclerosis I25.10    Peripheral vascular disease I73.9    Acute on chronic respiratory failure with hypoxia and hypercapnia ([facility]) J96.21, J96.22    GILDA (acute kidney injury) N17.9    COPD exacerbation ([facility]) J44.1       Isolation/Infection:   Isolation            Droplet Plus          Patient Infection Status       Infection Onset Added Last Indicated Last Indicated By Review Planned Expiration Resolved Resolved By    None active    Resolved    COVID-19 22 POCT COVID-19, Antigen   22 Infection                        Nurse Assessment:  Last Vital Signs: /79   Pulse (!) 107   Temp 97.5 °F (36.4 °C) (Axillary)   Resp (!) 31   Ht 1.651 m (5' 5\")   Wt 85.3 kg (188 lb)   SpO2 98%   BMI 31.28 kg/m²     Last documented pain score (0-10 scale):    Last Weight:   Wt Readings from Last 1 Encounters:   25 85.3 kg (188 lb)     Mental Status:  oriented, alert, and confused/forgetful at times    IV Access:  - None    Nursing Mobility/ADLs:  Walking   Assisted  Transfer  Assisted  Bathing  Assisted  Dressing  Assisted  Toileting  Assisted  Feeding  Independent  Med Admin  Assisted  Med Delivery   whole    Wound Care Documentation and Therapy:        Elimination:  Continence:   Bowel: No  Bladder: Morgan  Urinary Catheter: Insertion Date: 3/5/25 and Indication for Use of Catheter: Urology/Urologist seeing this patient or inserted indwelling catheter and Acute urinary retention/obstruction   Colostomy/Ileostomy/Ileal Conduit: No       Date of Last BM: 3/5/25    Intake/Output Summary (Last 24 hours) at 2025 1359  Last data filed at 2025 0351  Gross per 24 hour   Intake --   Output 150 ml   Net -150 ml     I/O last 3 completed shifts:  In: -   Out: 150 [Urine:150]    Safety Concerns:     At Risk for Falls    Impairments/Disabilities:      Amputation - Left BKA    Nutrition Therapy:  Current Nutrition Therapy:   - Oral Diet:  General    Routes of Feeding:

## 2025-02-26 NOTE — CONSULTS
(11/7/2022)    Social Connection and Isolation Panel [NHANES]     Frequency of Communication with Friends and Family: Three times a week     Frequency of Social Gatherings with Friends and Family: Once a week     Attends Temple Services: Never     Active Member of Clubs or Organizations: No     Attends Club or Organization Meetings: Never     Marital Status:    Housing Stability: Low Risk  (2/25/2025)    Housing Stability Vital Sign     Unable to Pay for Housing in the Last Year: No     Number of Times Moved in the Last Year: 0     Homeless in the Last Year: No      Pets: None  Travel: [place]  The patient was living at home with her     Family History:       Problem Relation Age of Onset    Cerebral Aneurysm Mother     Prostate Cancer Father    . Otherwise non-pertinent to the chief complaint.    REVIEW OF SYSTEMS:    Constitutional: Negative for fevers, chills, diaphoresis  Neurologic: Negative   Psychiatric: Negative  Rheumatologic: Negative   Endocrine: Negative  Hematologic: Negative  Immunologic: Negative  ENT: Negative  Respiratory: As in the HPI  Cardiovascular: Negative  GI: Negative  : Negative  Musculoskeletal: Negative  Skin: No rashes.     PHYSICAL EXAM:    Vitals:   /79   Pulse (!) 107   Temp 97.5 °F (36.4 °C) (Axillary)   Resp 28   Ht 1.651 m (5' 5\")   Wt 85.3 kg (188 lb)   SpO2 98%   BMI 31.28 kg/m²   Constitutional: The patient is sitting up in bed.  She is awake and in some respiratory distress.  She is on a BiPAP.  Skin: Warm and dry. No rashes were noted.   HEENT: Eyes show round, and reactive pupils. No jaundice. Moist mucous membranes, no ulcerations, no thrush.   Neck: Supple to movements. No lymphadenopathy.   Chest: No use of accessory muscles to breathe. Symmetrical expansion. Auscultation reveals no wheezing, crackles, or rhonchi.   Cardiovascular: S1 and S2 are rhythmic and regular. No murmurs appreciated.   Abdomen: Positive bowel sounds to auscultation.  Benign to palpation. No masses felt. No hepatosplenomegaly.  Extremities: Left BKA stump.  Lines: Peripheral.    Lab Results   Component Value Date    CRP 94.0 (H) 02/25/2025      No results found for: \"CRPHS\"  Lab Results   Component Value Date    SEDRATE 34 (H) 02/26/2025       Radiology:      Microbiology:  Pending    Recent Labs     02/25/25  2226   PROCAL 0.22*       Assessment:  HCAP left lower lobe  Leukocytosis associated to the above  Acute kidney injury-improving    Plan:    Stop Vancomycin   Start oral Linezolid  Continue Cefepime  Check cultures, baseline ESR, CRP  Nares screen MRSA  Will follow with you    Thank you for having us see this patient in consultation. I will be discussing this case with the treating physicians or communicated through the electronic health record.    Meng Mandel MD  12:14 PM  2/26/2025

## 2025-02-26 NOTE — PROGRESS NOTES
4 Eyes Skin Assessment     NAME:  Priya Garcia  YOB: 1940  MEDICAL RECORD NUMBER:  [id number]    The patient is being assessed for  Admission    I agree that at least one RN has performed a thorough Head to Toe Skin Assessment on the patient. ALL assessment sites listed below have been assessed.      Areas assessed by both nurses:    Head, Face, Ears, Shoulders, Back, Chest, Arms, Elbows, Hands, Sacrum. Buttock, Coccyx, Ischium, Legs. Feet and Heels, and Under Medical Devices         Does the Patient have a Wound? No noted wound(s)       Chuck Prevention initiated by RN: Yes  Wound Care Orders initiated by RN: No    Pressure Injury (Stage 3,4, Unstageable, DTI, NWPT, and Complex wounds) if present, place Wound referral order by RN under : No    New Ostomies, if present place, Ostomy referral order under : No     Nurse 1 eSignature: Electronically signed by Abisai Lambert RN on 2/26/25 at 12:11 AM EST    **SHARE this note so that the co-signing nurse can place an eSignature**    Nurse 2 eSignature: {Esignature:[id number]}

## 2025-02-27 ENCOUNTER — CARE COORDINATION (OUTPATIENT)
Dept: CARE COORDINATION | Age: 85
End: 2025-02-27

## 2025-02-27 PROBLEM — Z71.89 GOALS OF CARE, COUNSELING/DISCUSSION: Status: ACTIVE | Noted: 2025-02-27

## 2025-02-27 PROBLEM — Z51.5 PALLIATIVE CARE ENCOUNTER: Status: ACTIVE | Noted: 2025-02-27

## 2025-02-27 NOTE — PROGRESS NOTES
[facility]  [facility]  Progress Note    SUBJECTIVE:  Chief Complaint   Patient presents with    Respiratory Distress     Came from [facility] on nonrebreather at 10L. Normally wears 4-5 lpm. Lethargic,      The patient is complaining of dyspnea.  She does not think she is getting enough oxygen through the nasal cannula.  No fever.  Tolerating antibiotics.    Review of systems:  As stated above in the chief complaint, otherwise negative.    Medications:  Scheduled Meds:   ipratropium 0.5 mg-albuterol 2.5 mg  1 Dose Inhalation Q4H RT    ceFEPIme (MAXIPIME) 2,000 mg in sterile water 20 mL IV syringe  2,000 mg IntraVENous Q12H    linezolid  600 mg Oral 2 times per day    ARIPiprazole  5 mg Oral Nightly    cetirizine  10 mg Oral Daily    dilTIAZem  180 mg Oral BID    dronedarone hcl  400 mg Oral BID WC    metoprolol tartrate  25 mg Oral BID    sertraline  100 mg Oral Nightly    sodium chloride flush  5-40 mL IntraVENous 2 times per day    arformoterol tartrate  15 mcg Nebulization BID RT    budesonide  0.5 mg Nebulization BID RT    methylPREDNISolone  40 mg IntraVENous Q8H    pantoprazole (PROTONIX) 40 mg in sodium chloride (PF) 0.9 % 10 mL injection  40 mg IntraVENous Daily    amLODIPine  5 mg Oral Daily     Continuous Infusions:   sodium chloride      sodium chloride       PRN Meds:sodium chloride, ALPRAZolam, ammonium lactate, sodium chloride flush, sodium chloride, ondansetron **OR** ondansetron, polyethylene glycol, acetaminophen **OR** acetaminophen    OBJECTIVE:  /71   Pulse (!) 114   Temp 97.7 °F (36.5 °C) (Temporal)   Resp 22   Ht 1.651 m (5' 5\")   Wt 85.3 kg (188 lb)   SpO2 94%   BMI 31.28 kg/m²   Temp  Av.6 °F (36.4 °C)  Min: 97.1 °F (36.2 °C)  Max: 98.1 °F (36.7 °C)  Constitutional: The patient is seen up in bed.  She is awake and alert.  No distress.  Skin: Warm and dry. No rashes were noted.   HEENT: Round and reactive pupils.  Moist mucous membranes.  No

## 2025-02-27 NOTE — CONSULTS
Palliative Care Department  [phone]  Palliative Care Initial Consult  Provider Sweetie Perry, APRN - CNP     Priya Garcia  [id number]  Hospital Day: 3  Date of Initial Consult: 2/26/25  Referring Provider: Severiano Davis MD   Palliative Medicine was consulted for assistance with: End stage COPD / Difficulty compliance with BiPAP / Limited code status     HPI:   Priya Garcia is a 84 y.o. with a medical history of COPD, GI bleeds, hypertension, anxiety, DVT, VAD s/p left BKA, who was admitted on 2/25/2025 from nursing facility with a CHIEF COMPLAINT of respiratory distress.  She was recently discharged on 2/20 similar complaints, COPD exacerbation requiring AVAPS.  Patient wears 4 L NC at baseline, found to have SpO2 in the 60s at nursing facility and sent back to ER.  Found to be in respiratory acidosis with pH 7.13, PCO2 106 in ER she was placed on AVAPS.  CXR revealed bilateral lower lobe infiltrates.  Per notes, patient is intermittent refusing BiPAP due to anxiety, and confirmed with pulmonology she does not want intubated or heroic resuscitative measures, limited CODE STATUS DNR/DNI.  Palliative medicine was consulted for further assistance in goals of care.    ASSESSMENT/PLAN:     Pertinent Hospital Diagnoses     COPD exacerbation, end-stage COPD  Acute on chronic respiratory failure  Pneumonia  GILDA  A-fib      Palliative Care Encounter / Counseling Regarding Goals of Care  Please see detailed goals of care discussion as below  At this time, Priya Garcia, Does Not have capacity for medical decision-making.  Capacity is time limited and situation/question specific  During encounter George was surrogate medical decision-maker  Outcome of goals of care meeting:   Continue current care  Continue code status of Limited (DNR-CCA/DNI)  Patient seems to be more open to Bipap, pt and family okay with SNF at discharge   Code status Limited (DNR-CCA/DNI)  Advanced Directives: no

## 2025-02-27 NOTE — CARE COORDINATION
Social Work:    Therapy evaluations were held today by RN due to high flow 02 needs. Social work requested PT/OT see patient in the a.m. for authorization from insurance to return to [facility].  Social service also updated Niurka about PT/OT evaluations prompted for a.m. and need for insurance auth for expected weekend transfer back to [facility].  (N-17, ambulance completed)    Electronically signed by DESIRE Saenz on 2/27/2025 at 1:45 PM

## 2025-02-27 NOTE — PROGRESS NOTES
Physical Therapy  Facility/Department: [address] INTERNAL MEDICINE 2    Name: Priya Garcia  : 1940  MRN: [id number]  Date of Service: 2025        Attempted to see pt for PT evaluation. Hold secondary to respiratory status.   Yazmin Small PT [phone]

## 2025-02-27 NOTE — PROGRESS NOTES
[facility]  Progress Note    Chief complaint :  Chief Complaint   Patient presents with    Respiratory Distress     Came from [facility] on nonrebreather at 10L. Normally wears 4-5 lpm. Lethargic,        Subjective:    No overnight problems.   Patient describes feeling better. Patient denies chest pain, nausea, vomiting, bloody stools, fever, chills, changes in urination, changes in BM. Patient is tolerating diet.  Patient mentions that the SOB is improving.   Per nurse, patient did not utilize the bipap over night.   EKG done in the AM showed HR of 109.   Past medical, surgical, family and social history were reviewed, non-contributory, and unchanged unless otherwise stated.    Review of Systems    Negative unless stated above    Objective:  /78   Pulse 96   Temp 98.1 °F (36.7 °C) (Axillary)   Resp 22   Ht 1.651 m (5' 5\")   Wt 68.5 kg (151 lb)   SpO2 99%   BMI 25.13 kg/m²     Physical Exam    Vitals reviewed.   HENT:      Head: Normocephalic.      Right Ear: Tympanic membrane normal.      Left Ear: Tympanic membrane normal.      Mouth/Throat:      Mouth: Mucous membranes are moist.   Eyes:      Extraocular Movements: Extraocular movements intact.      Pupils: Pupils are equal, round, and reactive to light.   Cardiovascular:      Rate and Rhythm: Normal rate. Rhythm irregular.      Pulses: Normal pulses.      Heart sounds: Normal heart sounds.   Pulmonary:      Breath sounds: Wheezing (B/l diffuse scattered wheezes) present, improved since yesterday. B/l crepitations present.   Abdominal:      Palpations: Abdomen is soft.   Genitourinary:     Comments: deferred  Musculoskeletal:         General: Normal range of motion.      Cervical back: Normal range of motion.      Comments: Left BKA   Skin:     General: Skin is warm.      Capillary Refill: Capillary refill takes less than 2 seconds.   Neurological:      General: No focal deficit present.      Mental Status: She is    Disposition: Discharge to pending clinical course    Severiano Webber MD   Family Medicine Resident PGY-1  02/28/25   7:21 AM

## 2025-02-27 NOTE — CARE COORDINATION
Priya is currently inpatient with expected discharge to SNF.  ACM to follow along and outreach patient/family if appropriate at a later date.  If d/c to SNF, will close episode.

## 2025-02-27 NOTE — PROGRESS NOTES
Date Value Ref Range Status   08/16/2022 >60  Final     Magnesium   Date Value Ref Range Status   02/18/2025 2.7 (H) 1.6 - 2.6 mg/dL Final   08/16/2022 2.5 1.6 - 2.6 mg/dL Final     No results found for: \"PHOS\"  Recent Labs     02/26/25  1630   PH 7.290*   PO2 111.3*   PCO2 71.5*   HCO3 33.6*   BE 5.9*   O2SAT 98.0       RADIOLOGY:  CTA PULMONARY W CONTRAST   Final Result      1.  New consolidation within the posteromedial, inferior left lower lobe   consistent with pneumonia.  There is a very small left pleural effusion.   Some additional patchy opacity within the posterior lingula is more   indeterminate but could represent additional pneumonitis.      2.  Emphysematous change within the lungs with areas of scattered lung   scarring.      3.  Stable cardiomegaly.      4.  Otherwise unchanged.         XR CHEST PORTABLE   Final Result   Bibasilar infiltrates. Follow-up study until resolution recommended.           (Independently reviewed by me)      Nixon Orzoco MD  Pulmonary & Critical Care  2/27/2025 10:16 AM

## 2025-02-27 NOTE — PLAN OF CARE
Problem: Safety - Adult  Goal: Free from fall injury  2/27/2025 0901 by Vicenta Wise RN  Outcome: Progressing  2/27/2025 0145 by Deisy Maciel RN  Outcome: Progressing  2/27/2025 0145 by Deisy Maciel RN  Outcome: Progressing     Problem: Discharge Planning  Goal: Discharge to home or other facility with appropriate resources  Outcome: Progressing     Problem: Respiratory - Adult  Goal: Achieves optimal ventilation and oxygenation  2/27/2025 0901 by Vicenta Wise RN  Outcome: Progressing  2/27/2025 0145 by Deisy Maciel RN  Outcome: Not Progressing

## 2025-02-27 NOTE — PLAN OF CARE
Problem: Safety - Adult  Goal: Free from fall injury  2/27/2025 0145 by Deisy Maciel RN  Outcome: Progressing  2/27/2025 0145 by Deisy Maciel RN  Outcome: Progressing     Problem: Skin/Tissue Integrity  Goal: Skin integrity remains intact  Description: 1.  Monitor for areas of redness and/or skin breakdown  2.  Assess vascular access sites hourly  3.  Every 4-6 hours minimum:  Change oxygen saturation probe site  4.  Every 4-6 hours:  If on nasal continuous positive airway pressure, respiratory therapy assess nares and determine need for appliance change or resting period  2/27/2025 0145 by Deisy Maciel RN  Outcome: Progressing  2/27/2025 0145 by Deisy Maciel RN  Outcome: Progressing     Problem: Respiratory - Adult  Goal: Achieves optimal ventilation and oxygenation  Outcome: Not Progressing

## 2025-02-27 NOTE — PROGRESS NOTES
02/27/25 1723   NIV Type   NIV Started/Stopped (S)  On   Equipment Type V60   Mode AVAPS   Mask Type Full face mask   Mask Size Medium   Assessment   Comfort Level Good   Using Accessory Muscles Yes   Mask Compliance Good   Skin Assessment Clean, dry, & intact   Skin Protection for O2 Device Yes   Orientation Middle   Location Nose   Intervention(s) Skin Barrier   Settings/Measurements   PIP Observed 17 cm H20   IPAP Min 16 cmH2O   IPAP Max 25 cmH2O   Vt (Set, mL) 450 mL   Vt (Measured) 351 mL   Rate Ordered 20   FiO2  40 %   Minute Volume (L/min) 13.5 Liters   Mask Leak (lpm) 30 lpm   Patient's Home Machine No   Alarm Settings   Alarms On Y   Patient Observation   Patient Observations (S)  Placed on AVAPS per patient request

## 2025-02-28 NOTE — PROGRESS NOTES
Spiritual Health History and Assessment/Progress Note  [facility]    Attempted Encounter,  ,  ,      Name: Priya Garcia MRN: [id number]    Age: 84 y.o.     Sex: female   Language: English   Yazidism: Sikhism   COPD exacerbation (HCC)     Date: 2/27/2025                           Spiritual Assessment began in [address] INTERNAL MEDICINE 2        Referral/Consult From: Nurse   Encounter Overview/Reason: Attempted Encounter  Service Provided For: Patient not available (sleeping)    Ignacia, Belief, Meaning:   Patient unable to assess at this time  Family/Friends No family/friends present      Importance and Influence:  Patient unable to assess at this time  Family/Friends No family/friends present    Community:  Patient Other: Unable to assess  Family/Friends No family/friends present    Assessment and Plan of Care:     Patient Interventions include: Other: Unable to assess  Family/Friends Interventions include: No family/friends present    Patient Plan of Care: Spiritual Care available upon further referral  Family/Friends Plan of Care: No family/friends present    Electronically signed by Chaplain Janet on 2/27/2025 at 8:08 PM

## 2025-02-28 NOTE — CARE COORDINATION
Social Work:    Confirmed with Niurka at [facility] that authorization was started this a.m.  Await auth for possible weekend transfer back to [facility]. (N-17, ambulance form in chart)    Electronically signed by DESIRE Saenz on 2/28/2025 at 1:21 PM

## 2025-02-28 NOTE — PROGRESS NOTES
Physical Therapy  Facility/Department: [address] INTERNAL MEDICINE 2  Physical Therapy Initial Assessment    Name: Priya Garcia  : 1940  MRN: [id number]  Date of Service: 2025        Patient Diagnosis(es): The primary encounter diagnosis was Acute on chronic respiratory failure with hypoxia and hypercapnia (HCC). Diagnoses of Pneumonia due to infectious organism, unspecified laterality, unspecified part of lung and Acute kidney injury superimposed on CKD were also pertinent to this visit.  Past Medical History:  has a past medical history of Anxiety, COPD (chronic obstructive pulmonary disease) (HCC), and Hx of blood clots.  Past Surgical History:  has a past surgical history that includes joint replacement (Right, 2019) and Leg amputation below knee (Left, 2017).      Evaluating Therapist: Yazmin Small PT      Room #:  0407/0407-A  Diagnosis:  COPD exacerbation (HCC) [J44.1]  Acute on chronic respiratory failure with hypoxia and hypercapnia (HCC) [J96.21, J96.22]  Acute kidney injury superimposed on CKD [N17.9, N18.9]  Pneumonia due to infectious organism, unspecified laterality, unspecified part of lung [J18.9]  PMHx/PSHx:  L BKA, CIOD, anxiety  Precautions:  falls, O2, alarm      Social:  Pt admitted from [facility]. Prior to [facility] lived with  and ambulated with prosthesis and ww. States at [facility] was just starting to walk.      Initial Evaluation  Date: 25 Treatment      Short Term/ Long Term   Goals   Was pt agreeable to Eval/treatment? yes     Does pt have pain? No c/o pain     Bed Mobility  Rolling: mod assist  Supine to sit: mod assist  Sit to supine: mod assist  Scooting: mod assist  SBA   Transfers NT, pt unable due to respiratory status and not having prosthesis here  Min assist   Ambulation    NT  5 feet with ww and prosthesis with min assist   Stair Negotiation  Ascended and descended  NT   N/A   LE strength     3/5    3+/5   balance      Sitting balance CGA     AM-PAC Raw  contractures  [x] Safety and Education Training   [x] Patient/Caregiver Education   [] HEP  [] Other     PT long term treatment goals are located in above grid    Frequency of treatments: 2-5x/week x 5-7 days.    Time in  0922  Time out  0937      Evaluation Time includes thorough review of current medical information, gathering information on past medical history/social history and prior level of function, completion of standardized testing/informal observation of tasks, assessment of data and education on plan of care and goals.      CPT codes:  [x] Low Complexity PT evaluation 58349  [] Moderate Complexity PT evaluation 00496  [] High Complexity PT evaluation 02696  [] PT Re-evaluation 51715  [] Gait training 10679 minutes  [] Manual therapy 05542 minutes  [] Therapeutic activities 03827 minutes  [] Therapeutic exercises 67030 minutes  [] Neuromuscular reeducation 77858 minutes     Yazmin Small PT [id number]

## 2025-02-28 NOTE — PLAN OF CARE
Problem: Safety - Adult  Goal: Free from fall injury  2/27/2025 2009 by Deisy Maciel RN  Outcome: Progressing  2/27/2025 0901 by Vicenta Wise RN  Outcome: Progressing     Problem: Discharge Planning  Goal: Discharge to home or other facility with appropriate resources  2/27/2025 0901 by Vicenta Wise RN  Outcome: Progressing     Problem: Skin/Tissue Integrity  Goal: Skin integrity remains intact  Description: 1.  Monitor for areas of redness and/or skin breakdown  2.  Assess vascular access sites hourly  3.  Every 4-6 hours minimum:  Change oxygen saturation probe site  4.  Every 4-6 hours:  If on nasal continuous positive airway pressure, respiratory therapy assess nares and determine need for appliance change or resting period  2/27/2025 2009 by Deisy Maciel RN  Outcome: Progressing  2/27/2025 0901 by Vicenta Wise RN  Outcome: Progressing     Problem: Nutrition Deficit:  Goal: Optimize nutritional status  2/27/2025 0901 by Vicenta Wise RN  Outcome: Progressing     Problem: Respiratory - Adult  Goal: Achieves optimal ventilation and oxygenation  2/27/2025 2009 by Deisy Maciel RN  Outcome: Progressing  2/27/2025 0901 by Vicenta Wise RN  Outcome: Progressing

## 2025-02-28 NOTE — PROGRESS NOTES
Occupational Therapy    OCCUPATIONAL THERAPY INITIAL EVALUATION    [facility]   [address], [place]         Date:2025                                                  Patient Name: Priya Garcia    MRN: [id number]    : 1940    Room: [address] [place] [address]      Evaluating OT: Sujata Lugo OTR/L   [id number]      Referring Provider:Severiano Davis MD     Specific Provider Orders/Date:OT eval and treat       Diagnosis:  COPD exacerbation (HCC) [J44.1]  Acute on chronic respiratory failure with hypoxia and hypercapnia (HCC) [J96.21, J96.22]  Acute kidney injury superimposed on CKD [N17.9, N18.9]  Pneumonia due to infectious organism, unspecified laterality, unspecified part of lung [J18.9]     Pertinent Medical History: anxiety, L BKA     Precautions:  Fall Risk, O2     Assessment of current deficits    [x] Functional mobility  [x]ADLs  [x] Strength               []Cognition    [x] Functional transfers   [x] IADLs         [x] Safety Awareness   [x]Endurance    [] Fine Coordination              [x] Balance      [] Vision/perception   []Sensation     []Gross Motor Coordination  [] ROM  [] Delirium                   [] Motor Control     OT PLAN OF CARE   OT POC based on physician orders, patient diagnosis and results of clinical assessment    Frequency/Duration  2-4 days/wk for 2 - 4weeks PRN   Specific OT Treatment Interventions to include:   ADL retraining/adapted techniques and AE recommendations to increase functional independence within precautions                    Energy conservation techniques to improve tolerance for selfcare routine   Functional transfer/mobility training/DME recommendations for increased independence, safety and fall prevention         Patient/family education to increase safety and functional independence             Environmental modifications for safe mobility and completion of ADLs

## 2025-02-28 NOTE — PROGRESS NOTES
[facility]  [facility]  Progress Note    SUBJECTIVE:  Chief Complaint   Patient presents with    Respiratory Distress     Came from [facility] on nonrebreather at 10L. Normally wears 4-5 lpm. Lethargic,      The patient is feeling slightly better today.  Tolerating antibiotics.  No fever.  Minimally productive cough.    Review of systems:  As stated above in the chief complaint, otherwise negative.    Medications:  Scheduled Meds:   methylPREDNISolone  40 mg IntraVENous Q12H    ipratropium 0.5 mg-albuterol 2.5 mg  1 Dose Inhalation Q4H RT    ceFEPIme (MAXIPIME) 2,000 mg in sterile water 20 mL IV syringe  2,000 mg IntraVENous Q12H    linezolid  600 mg Oral 2 times per day    ARIPiprazole  5 mg Oral Nightly    cetirizine  10 mg Oral Daily    dilTIAZem  180 mg Oral BID    dronedarone hcl  400 mg Oral BID WC    metoprolol tartrate  25 mg Oral BID    sertraline  100 mg Oral Nightly    sodium chloride flush  5-40 mL IntraVENous 2 times per day    arformoterol tartrate  15 mcg Nebulization BID RT    budesonide  0.5 mg Nebulization BID RT    pantoprazole (PROTONIX) 40 mg in sodium chloride (PF) 0.9 % 10 mL injection  40 mg IntraVENous Daily    amLODIPine  5 mg Oral Daily     Continuous Infusions:   sodium chloride      sodium chloride       PRN Meds:sodium chloride, ALPRAZolam, ammonium lactate, sodium chloride flush, sodium chloride, ondansetron **OR** ondansetron, polyethylene glycol, acetaminophen **OR** acetaminophen    OBJECTIVE:  BP (!) 133/54   Pulse 98   Temp 97.7 °F (36.5 °C) (Oral)   Resp 22   Ht 1.651 m (5' 5\")   Wt 68.5 kg (151 lb)   SpO2 93%   BMI 25.13 kg/m²   Temp  Av.7 °F (36.5 °C)  Min: 97.3 °F (36.3 °C)  Max: 98.1 °F (36.7 °C)  Constitutional: The patient is seen up in bed.  She is awake and alert.  O2 by nasal cannula.  Hard of hearing.  Visitors in room.  Skin: Warm and dry. No rashes were noted.   HEENT: Round and reactive pupils.  Moist mucous membranes.  No  ulcerations or thrush.  Neck: Supple to movements.   Chest: Using accessory muscles to breathe.  Prolonged expiratory phase.  Scattered rhonchi.  Cardiovascular: Heart sounds rhythmic and regular.  Abdomen: Positive bowel sounds to auscultation. Benign to palpation.  Extremities: No edema.  Lines: Peripheral.    Laboratory and Tests:  Lab Results   Component Value Date    CRP 94.0 (H) 02/25/2025     Lab Results   Component Value Date    SEDRATE 34 (H) 02/26/2025       Radiology:      Microbiology:   Urine antigens: Canceled  Blood cultures 2/25/2025: Staphylococcus species in 1 of 4 bottles    Recent Labs     02/25/25  2226   PROCAL 0.22*       ASSESSMENT:  HCAP left lower lobe  Leukocytosis secondary to HCAP-improved  Acute kidney injury-improved  Staphylococcal bacteremia.  This is a contaminant    PLAN:  Continue Vancomycin and Cefepime for now  No need to treat contaminant in the blood  We will follow with you    Spoke with nursing.    Meng Mandel MD  11:17 AM  2/28/2025

## 2025-02-28 NOTE — PLAN OF CARE
Problem: Discharge Planning  Goal: Discharge to home or other facility with appropriate resources  Outcome: Progressing     Problem: Safety - Adult  Goal: Free from fall injury  2/28/2025 0947 by Vicenta Wise RN  Outcome: Progressing  2/27/2025 2009 by Deisy Maciel RN  Outcome: Progressing     Problem: Respiratory - Adult  Goal: Achieves optimal ventilation and oxygenation  2/28/2025 0947 by Vicenta Wise RN  Outcome: Progressing  2/27/2025 2009 by Deisy Maciel RN  Outcome: Progressing

## 2025-02-28 NOTE — PROGRESS NOTES
[facility]  Department of Pulmonary, Critical Care and Sleep Medicine  Department of Internal Medicine    Pulmonary/Critical Care Progress Note    CHIEF COMPLAINT: Acute Respiratory Failure     ISSUES:    Healthcare  Associated Pneumonia  COPD exacerbation (HCC)  Acute on Chronic Hypercapneic Respiratory Failure   Severe Emphysema  On bronchodilators   On Cefepime / Vanco (due to HAP)  Better gain today  Wore BiPAP/ AVAPS during day, less so during night  Encouraged compliance  Follow-up ABG / VBG as needed  Decrease Steroids to daily    Goals of Care 2/26/2025   Reviewed with pt and then   Pt does not want machines / heroic measures   reiterates pt would not want to be intubated  Therefore pt is a DNR/DNI - order placed    Encourage use of BiPAP  If either refuses or fails - will focus on comfort    Palliative care consult appreciated     Anxiety  Agoraphobia  Not particularly anxious at this time  Xanax PRN    GILDA (acute kidney injury)  Secondary to above   Resolved     History of DVT (deep vein thrombosis)  Hypertension  Peripheral arterial disease  Anemia  Comorbidities     Overall slow improvement  Discharge planning     CODE STATUS: Limited DNR/ DNI  __________________________________________________    Subjective  Better today  Less SOB  Still not feeling well    Events last 24 hr  More compliant with BiPAP        Primary Pulmonologist: Dr. Renetta Manzano     Reason for Consult: Acute Respiratory Failure       HISTORY OF PRESENT ILLNESS:   Priya Garcia is a 84 y.o. female with hx of  COPD,Anemia, GI Bleed, HTN, Anxiety, H/o DVT,PAD, HLD, Left BKA,  presents with COPD exacerbation and Acute Respiratory Failure.    Pt states increase SOB with cough productive of brown sputum over last few days. Denies Fever, Chills or Sweats   Was recently discharged from hospital (2/20) and came from [place] facility.           ALLERGY:  Patient

## 2025-03-01 NOTE — PLAN OF CARE
Problem: Safety - Adult  Goal: Free from fall injury  2/28/2025 2319 by Mariann No RN  Outcome: Progressing  2/28/2025 0947 by Vicenta Wise RN  Outcome: Progressing     Problem: Discharge Planning  Goal: Discharge to home or other facility with appropriate resources  2/28/2025 2319 by Mariann No RN  Outcome: Progressing  2/28/2025 0947 by Vicenta Wise RN  Outcome: Progressing     Problem: Skin/Tissue Integrity  Goal: Skin integrity remains intact  Description: 1.  Monitor for areas of redness and/or skin breakdown  2.  Assess vascular access sites hourly  3.  Every 4-6 hours minimum:  Change oxygen saturation probe site  4.  Every 4-6 hours:  If on nasal continuous positive airway pressure, respiratory therapy assess nares and determine need for appliance change or resting period  2/28/2025 2319 by Mariann No RN  Outcome: Progressing  2/28/2025 0947 by Vicenta Wise RN  Outcome: Progressing     Problem: Nutrition Deficit:  Goal: Optimize nutritional status  2/28/2025 2319 by Mariann No RN  Outcome: Progressing  2/28/2025 0947 by Vicenta Wise RN  Outcome: Progressing     Problem: Respiratory - Adult  Goal: Achieves optimal ventilation and oxygenation  2/28/2025 2319 by Mariann No RN  Outcome: Progressing  2/28/2025 0947 by Vicenta Wise RN  Outcome: Progressing

## 2025-03-01 NOTE — PROGRESS NOTES
02/28/25 2125   NIV Type   NIV Started/Stopped On   Equipment Type v60   Mode AVAPS   Mask Type Full face mask   Mask Size Medium   Assessment   Level of Consciousness 1   Comfort Level Good   Using Accessory Muscles No   Mask Compliance Good   Skin Assessment Clean, dry, & intact   Skin Protection for O2 Device Yes   Orientation Middle   Location Nose   Intervention(s) Skin Barrier   Settings/Measurements   PIP Observed 16 cm H20   CPAP/EPAP 6 cmH2O   IPAP Min 16 cmH2O   IPAP Max 25 cmH2O   Vt (Set, mL) 450 mL   Vt (Measured) 688 mL   Rate Ordered 20   Insp Rise Time (%) 3 %   FiO2  40 %   I Time/ I Time % 0.9 s   Minute Volume (L/min) 13.8 Liters   Mask Leak (lpm) 60 lpm   Patient's Home Machine No   Alarm Settings   Alarms On Y

## 2025-03-01 NOTE — PROGRESS NOTES
Called  for high HR.  EKG obtained and reviewed.  HR - 95.  Atrial fibrillation rate controlled with no new changes from previous EKG.  Patient has no complaints of chest pain, SOB, Nausea, vomiting.   Spoke with her son regarding the course of treatment in the hospital.    Electronically signed by Severiano Webber MD on 3/1/2025 at 6:21 PM

## 2025-03-01 NOTE — PROGRESS NOTES
[facility]  Progress Note    Chief complaint :  Chief Complaint   Patient presents with    Respiratory Distress     Came from [place] on nonrebreather at 10L. Normally wears 4-5 lpm. Lethargic,        Subjective:    No overnight problems.   Patient describes feeling better. Patient denies chest pain, nausea, vomiting, bloody stools, fever, chills, changes in urination, changes in BM. Patient is tolerating diet.  Patient complaints of some SOB.  Past medical, surgical, family and social history were reviewed, non-contributory, and unchanged unless otherwise stated.    Review of Systems    Negative unless stated above    Objective:  /66   Pulse 90   Temp 97.6 °F (36.4 °C) (Infrared)   Resp 22   Ht 1.651 m (5' 5\")   Wt 68.5 kg (151 lb)   SpO2 98%   BMI 25.13 kg/m²     Physical Exam    Vitals reviewed.   HENT:      Head: Normocephalic.      Right Ear: Tympanic membrane normal.      Left Ear: Tympanic membrane normal.      Mouth/Throat:      Mouth: Mucous membranes are moist.   Eyes:      Extraocular Movements: Extraocular movements intact.      Pupils: Pupils are equal, round, and reactive to light.   Cardiovascular:      Rate and Rhythm: Normal rate. Rhythm irregular.      Pulses: Normal pulses.      Heart sounds: Normal heart sounds.   Pulmonary:      Breath sounds: Wheezing (B/l diffuse scattered wheezes) present, improved since yesterday. B/l basal crepitations present.   Abdominal:      Palpations: Abdomen is soft.   Genitourinary:     Comments: deferred  Musculoskeletal:         General: Normal range of motion.      Cervical back: Normal range of motion.      Comments: Left BKA   Skin:     General: Skin is warm.      Capillary Refill: Capillary refill takes less than 2 seconds.   Neurological:      General: No focal deficit present.      Mental Status: She is alert and oriented to person, place, and time.   Psychiatric:         Mood and Affect:

## 2025-03-01 NOTE — PROGRESS NOTES
[facility]  Department of Pulmonary, Critical Care and Sleep Medicine  [facility]  Department of Internal Medicine  Progress Note    SUBJECTIVE:    No overnight problems.  Patient describes feeling better.   Patient denies chest pain, nausea, vomiting, bloody stools, fever, chills, changes in urination, changes in BM.   Patient is tolerating diet.  Patient complaints of some SOB.  Doesnt like NIV    OBJECTIVE:  Vitals:    03/01/25 0406 03/01/25 0440 03/01/25 0441 03/01/25 0753   BP: 124/66   121/89   Pulse: 94 90  (!) 102   Resp: 22 22 22 24   Temp: 97.6 °F (36.4 °C)   97.3 °F (36.3 °C)   TempSrc: Infrared   Axillary   SpO2: 98% 98% 98% 98%   Weight:       Height:         Constitutional: Alert,     EENT: No icterus. No thrush. Dry MM    Neck: No thyromegaly. No elevated JVP. Trachea was midline.   Respiratory: Asymmetrical.  Course  paradoxical at times.    Cardiovascular: Irregular, No murmur. No rubs.      Pulses:  Equal bilaterally.    Abdomen: Soft without organomegaly. No rebound, rigidity.  No guarding.  Lymphatic: No lymphadenopathy.  Musculoskeletal: Weakness / gross deficits  Extremities:  No lower extremity edema. Reflexes appear adequate.   Skin:  Warm and dry.  + rashes.   Neurological/Psychiatric: No acute psychosis. Cranial nerves are intact.        DATA:  The data collected below information that was obtained, reviewed, analyzed and interpreted today. Imaging test are reviewed with the radiologist during weekly conference rounds. Comparison to previous images are always explored.     Monitor Strips:  My interpretation and reviewed of the cardiac monitor and further discussion with technical team reveals no changes noted and the patient is in sinus rhythm     RADIOLOGY:  My interpretation and review of the current films reveals  New consolidation within the posteromedial, inferior left lower lobe consistent with pneumonia.  There is a very

## 2025-03-01 NOTE — PROGRESS NOTES
Physician Progress Note      PATIENT:               MICHAEL CRESPO  CSN #:                  [id number]  :                       1940  ADMIT DATE:       2025 1:51 PM  DISCH DATE:  RESPONDING  PROVIDER #:        INDIANA CAMILO          QUERY TEXT:    Pt admitted with COPD exacerbation and Pneumonia. Pt noted to have on   admission HR , Wbc 13.2, procal 0.22, CRP 94.0, Respiratory failure,   GILDA. If possible, please document in the progress notes and discharge summary   if you are evaluating and /or treating any of the following:    The medical record reflects the following:  Risk Factors: pneumonia  Clinical Indicators: on admission HR , Wbc 13.2, procal 0.22, CRP 94.0,   Respiratory failure, GILDA.  Treatment: IV Cefepime, po Linezolid  Options provided:  -- Sepsis, present on admission  -- Sepsis, present on admission, now resolved  -- Pneumonia without Sepsis  -- Other - I will add my own diagnosis  -- Disagree - Not applicable / Not valid  -- Disagree - Clinically unable to determine / Unknown  -- Refer to Clinical Documentation Reviewer    PROVIDER RESPONSE TEXT:    This patient was treated for sepsis this admission, which was present on   admission and is currently resolved.    Query created by: Parul Brown on 2025 10:20 AM      Electronically signed by:  INDIANA CAMILO 3/1/2025 6:02   PM

## 2025-03-01 NOTE — PROGRESS NOTES
[facility]  [facility]  Progress Note    SUBJECTIVE:  Chief Complaint   Patient presents with    Respiratory Distress     Came from [facility] on nonrebreather at 10L. Normally wears 4-5 lpm. Lethargic,      Patient is sitting up in bed.  Says she is doing good today  On 7 L high flow nasal cannula  Tolerating antibiotics  No diarrhea    Review of systems:  As stated above in the chief complaint, otherwise negative.    Medications:  Scheduled Meds:   [START ON 3/2/2025] predniSONE  20 mg Oral Daily    ipratropium 0.5 mg-albuterol 2.5 mg  1 Dose Inhalation Q4H RT    ceFEPIme (MAXIPIME) 2,000 mg in sterile water 20 mL IV syringe  2,000 mg IntraVENous Q12H    linezolid  600 mg Oral 2 times per day    ARIPiprazole  5 mg Oral Nightly    cetirizine  10 mg Oral Daily    dilTIAZem  180 mg Oral BID    dronedarone hcl  400 mg Oral BID WC    metoprolol tartrate  25 mg Oral BID    sertraline  100 mg Oral Nightly    sodium chloride flush  5-40 mL IntraVENous 2 times per day    arformoterol tartrate  15 mcg Nebulization BID RT    budesonide  0.5 mg Nebulization BID RT    pantoprazole (PROTONIX) 40 mg in sodium chloride (PF) 0.9 % 10 mL injection  40 mg IntraVENous Daily    amLODIPine  5 mg Oral Daily     Continuous Infusions:   sodium chloride      sodium chloride       PRN Meds:sodium chloride, ALPRAZolam, ammonium lactate, sodium chloride flush, sodium chloride, ondansetron **OR** ondansetron, polyethylene glycol, acetaminophen **OR** acetaminophen    OBJECTIVE:  /81   Pulse 90   Temp 97.8 °F (36.6 °C) (Infrared)   Resp (!) 32   Ht 1.651 m (5' 5\")   Wt 68.5 kg (151 lb)   SpO2 91%   BMI 25.13 kg/m²   Temp  Av.8 °F (36.6 °C)  Min: 97.3 °F (36.3 °C)  Max: 98.8 °F (37.1 °C)  Constitutional: The patient is sitting up in bed.  In no distress.  7 L nasal cannula today  Skin: Warm and dry. No rashes were noted.   HEENT: Round and reactive pupils.  Moist mucous membranes.  No ulcerations  or thrush.  Neck: Supple to movements.   Chest: No use of accessory muscles to breathe.  Prolonged expiratory phase.  Scattered faint rhonchi.  Cardiovascular: Heart sounds rhythmic and regular.  Abdomen: Positive bowel sounds to auscultation. Benign to palpation.  Extremities: No edema.  Lines: Peripheral.    Laboratory and Tests:  Lab Results   Component Value Date    CRP 94.0 (H) 02/25/2025     Lab Results   Component Value Date    SEDRATE 34 (H) 02/26/2025       Radiology:  Reviewed     Microbiology:   Urine antigens: Canceled  Blood cultures 2/25/2025: Staphylococcus species in 1 of 4 bottles    No results for input(s): \"PROCAL\" in the last 72 hours.    ASSESSMENT:  HCAP left lower lobe  Leukocytosis secondary to HCAP-improved  Acute kidney injury-improved  Staphylococcal bacteremia.  This is a contaminant    PLAN:  Continue Linezolid and Cefepime for now  No need to treat contaminant in the blood  We will follow with you    JENNY Navarro - CNP  1:07 PM  3/1/2025  Pt seen and examined. Above discussed agree with advanced practice nurse. Labs, cultures, and radiographs reviewed.  Face to Face encounter occurred. Changes made as necessary.     Ulices Thapa MD

## 2025-03-02 NOTE — PROGRESS NOTES
03/01/25 2864   NIV Type   NIV Started/Stopped On   Equipment Type v60   Mode AVAPS   Mask Type Full face mask   Mask Size Medium   Assessment   Level of Consciousness 0   Comfort Level Good   Using Accessory Muscles No   Mask Compliance Good   Skin Assessment Clean, dry, & intact   Skin Protection for O2 Device Yes   Orientation Middle   Location Nose   Intervention(s) Skin Barrier   Settings/Measurements   PIP Observed 16 cm H20   CPAP/EPAP 6 cmH2O   IPAP Min 16 cmH2O   IPAP Max 25 cmH2O   Vt (Set, mL) 450 mL   Vt (Measured) 425 mL   Rate Ordered 20   Insp Rise Time (%) 3 %   FiO2  40 %   I Time/ I Time % 0.9 s   Minute Volume (L/min) 10.2 Liters   Mask Leak (lpm) 42 lpm   Patient's Home Machine No   Alarm Settings   Alarms On Y

## 2025-03-02 NOTE — PROGRESS NOTES
[facility]  [facility]  Progress Note    SUBJECTIVE:  Chief Complaint   Patient presents with    Respiratory Distress     Came from [facility] on nonrebreather at 10L. Normally wears 4-5 lpm. Lethargic,      Patient is sitting up in bed.  In no distress.  Denies shortness of breath.  On 6 L nasal cannula  Asking for some ice water  No diarrhea  No fevers    Review of systems:  As stated above in the chief complaint, otherwise negative.    Medications:  Scheduled Meds:   predniSONE  20 mg Oral Daily    ipratropium 0.5 mg-albuterol 2.5 mg  1 Dose Inhalation Q4H RT    ceFEPIme (MAXIPIME) 2,000 mg in sterile water 20 mL IV syringe  2,000 mg IntraVENous Q12H    linezolid  600 mg Oral 2 times per day    ARIPiprazole  5 mg Oral Nightly    cetirizine  10 mg Oral Daily    dilTIAZem  180 mg Oral BID    dronedarone hcl  400 mg Oral BID WC    metoprolol tartrate  25 mg Oral BID    sertraline  100 mg Oral Nightly    sodium chloride flush  5-40 mL IntraVENous 2 times per day    arformoterol tartrate  15 mcg Nebulization BID RT    budesonide  0.5 mg Nebulization BID RT    pantoprazole (PROTONIX) 40 mg in sodium chloride (PF) 0.9 % 10 mL injection  40 mg IntraVENous Daily    amLODIPine  5 mg Oral Daily     Continuous Infusions:   sodium chloride      sodium chloride       PRN Meds:white petrolatum, sodium chloride, ALPRAZolam, ammonium lactate, sodium chloride flush, sodium chloride, ondansetron **OR** ondansetron, polyethylene glycol, acetaminophen **OR** acetaminophen    OBJECTIVE:  BP (!) 152/99   Pulse (!) 115   Temp 98 °F (36.7 °C) (Oral)   Resp 22   Ht 1.651 m (5' 5\")   Wt 68.5 kg (151 lb)   SpO2 97%   BMI 25.13 kg/m²   Temp  Av.8 °F (36.6 °C)  Min: 97.2 °F (36.2 °C)  Max: 98.2 °F (36.8 °C)  Constitutional: The patient is sitting up in bed.  In no distress.  6 L nasal cannula today  Skin: Warm and dry. No rashes were noted.   HEENT: Round and reactive pupils.  Moist mucous membranes.   No ulcerations or thrush.  Neck: Supple to movements.   Chest: No use of accessory muscles to breathe.  Scattered faint rhonchi.  Diminished throughout  Cardiovascular: Heart sounds rhythmic and regular.  Abdomen: Positive bowel sounds to auscultation. Benign to palpation.  Extremities: No edema.  Lines: Peripheral.    Laboratory and Tests:  Lab Results   Component Value Date    CRP 94.0 (H) 02/25/2025     Lab Results   Component Value Date    SEDRATE 34 (H) 02/26/2025       Radiology:  Reviewed     Microbiology:   Urine antigens: Canceled  Blood cultures 2/25/2025: Staphylococcus species in 1 of 4 bottles    No results for input(s): \"PROCAL\" in the last 72 hours.    ASSESSMENT:  HCAP left lower lobe  Leukocytosis secondary to HCAP-improved  Acute kidney injury-improved  Staphylococcal bacteremia.  This is a contaminant    PLAN:  Continue Linezolid and Cefepime for now (day 6)  No need to treat contaminant in the blood  We will follow with you    JENNY Navarro - CNP  1:41 PM  3/2/2025  Pt seen and examined. Above discussed agree with advanced practice nurse. Labs, cultures, and radiographs reviewed.  Face to Face encounter occurred. Changes made as necessary.     Ulices Thapa MD

## 2025-03-02 NOTE — CONSULTS
General Surgery  Attending Consultation    Patient's Name/Date of Birth: Priya Garcia / 1940    Date: 2/25/2025    PCP/Referring Physician: David Coffey MD      CHIEF COMPLAINT:    Chief Complaint   Patient presents with    Respiratory Distress     Came from [facility] on nonrebreather at 10L. Normally wears 4-5 lpm. Lethargic,        HISTORY OF PRESENT ILLNESS:    Priya Garcia is an 84 y.o. female PMHx significant for of COPD with 45 pack year smoking history,Anemia, GI Bleed 2/2 Gastritis, HTN, Anxiety, H/o DVT,PAD, HLD, Left BKA in 2017 and was recently admitted (2/17 -2/20) for CO2 retention/COPD exacerbation presented to the hospital from a nursing facility with COPD exacerbation. I was consulted for hemoccult positive stool and anemia. The patient has no n/v. No diarrhea or constipation. She denies ever having had an EGD or colonoscopy. No family history of GI surgery. No abdominal pain. Her baseline Hg appears to be 8-9 and has been around 8 this admission and last. She reportedly had hemoccult positive stool.        Past Medical History:   Past Medical History:   Diagnosis Date    Anxiety     COPD (chronic obstructive pulmonary disease) (HCC)     Hx of blood clots         Past Surgical History:   Past Surgical History:   Procedure Laterality Date    JOINT REPLACEMENT Right 12/30/2019    RIGHT HIP    LEG AMPUTATION BELOW KNEE Left 01/02/2017        Allergies: Patient has no known allergies.     Medications:   Current Facility-Administered Medications   Medication Dose Route Frequency Provider Last Rate Last Admin    white petrolatum ointment   Topical PRN Mellisa Jimenez MD        predniSONE (DELTASONE) tablet 20 mg  20 mg Oral Daily Jonah Moses DO   20 mg at 03/02/25 0853    ipratropium 0.5 mg-albuterol 2.5 mg (DUONEB) nebulizer solution 1 Dose  1 Dose Inhalation Q4H RT Severiano Davis MD   1 Dose at 03/02/25 0859    ceFEPIme (MAXIPIME) 2,000 mg in  provider notes reviewed.  The above was discussed with the patient at length.    Trend Hg - currently at baseline  Will need her respiratory status to improve prior to anesthesia and scopes  PPI daily   Once this has improved, can proceed with EGD, colonoscopy      Physician Signature: Electronically signed by Dr. Elizabeth Adams MD, FACS    Send copy of H&P to PCP, Alexx, MD David

## 2025-03-02 NOTE — PROGRESS NOTES
[facility]  Progress Note    Chief complaint :  Chief Complaint   Patient presents with    Respiratory Distress     Came from [place] on nonrebreather at 10L. Normally wears 4-5 lpm. Lethargic,        Subjective:    No overnight problems.   Patient describes feeling better. Patient denies chest pain, nausea, vomiting, bloody stools, fever, chills, changes in urination, changes in BM. Patient is tolerating diet.  Patient complaints of some SOB.  Past medical, surgical, family and social history were reviewed, non-contributory, and unchanged unless otherwise stated.    Review of Systems    Negative unless stated above    Objective:  /69   Pulse (!) 103   Temp 98.2 °F (36.8 °C) (Infrared)   Resp 21   Ht 1.651 m (5' 5\")   Wt 68.5 kg (151 lb)   SpO2 97%   BMI 25.13 kg/m²     Physical Exam    Vitals reviewed.   HENT:      Head: Normocephalic.      Right Ear: Tympanic membrane normal.      Left Ear: Tympanic membrane normal.      Mouth/Throat:      Mouth: Mucous membranes are moist.   Eyes:      Extraocular Movements: Extraocular movements intact.      Pupils: Pupils are equal, round, and reactive to light.   Cardiovascular:      Rate and Rhythm: Normal rate. Rhythm irregular.      Pulses: Normal pulses.      Heart sounds: Normal heart sounds.   Pulmonary:      Breath sounds: Wheezing (B/l diffuse scattered wheezes) present, improved since yesterday. B/l basal crepitations present.   Abdominal:      Palpations: Abdomen is soft.   Genitourinary:     Comments: deferred  Musculoskeletal:         General: Normal range of motion.      Cervical back: Normal range of motion.      Comments: Left BKA   Skin:     General: Skin is warm.      Capillary Refill: Capillary refill takes less than 2 seconds.   Neurological:      General: No focal deficit present.      Mental Status: She is alert and oriented to person, place, and time.   Psychiatric:         Mood and Affect:

## 2025-03-02 NOTE — PROGRESS NOTES
[facility]  Department of Pulmonary, Critical Care and Sleep Medicine  [facility] [place]  Department of Internal Medicine  Progress Note    SUBJECTIVE:    No overnight problems.  Patient describes feeling better.   Patient denies chest pain, nausea, vomiting, bloody stools, fever, chills, changes in urination, changes in BM.   Patient is tolerating diet.  Patient complaints of some SOB.  Doesnt like NIV    OBJECTIVE:  Vitals:    03/02/25 0136 03/02/25 0354 03/02/25 0445 03/02/25 0720   BP:   137/69 (!) 152/99   Pulse:   (!) 103 (!) 115   Resp: 24 22 21 22   Temp:   98.2 °F (36.8 °C) 98 °F (36.7 °C)   TempSrc:   Infrared Oral   SpO2:   97% 97%   Weight:       Height:         Constitutional: Alert,     EENT: No icterus. No thrush. Dry MM    Neck: No thyromegaly. No elevated JVP. Trachea was midline.   Respiratory: Asymmetrical.  Course  paradoxical at times.    Cardiovascular: Irregular, No murmur. No rubs.      Pulses:  Equal bilaterally.    Abdomen: Soft without organomegaly. No rebound, rigidity.  No guarding.  Lymphatic: No lymphadenopathy.  Musculoskeletal: Weakness / gross deficits  Extremities:  No lower extremity edema. Reflexes appear adequate.   Skin:  Warm and dry.  + rashes.   Neurological/Psychiatric: No acute psychosis. Cranial nerves are intact.        DATA:  The data collected below information that was obtained, reviewed, analyzed and interpreted today. Imaging test are reviewed with the radiologist during weekly conference rounds. Comparison to previous images are always explored.     Monitor Strips:  My interpretation and reviewed of the cardiac monitor and further discussion with technical team reveals no changes noted and the patient is in sinus rhythm     RADIOLOGY:  My interpretation and review of the current films reveals  New consolidation within the posteromedial, inferior left lower lobe consistent with pneumonia.  There is a very small

## 2025-03-03 PROBLEM — N17.9 AKI (ACUTE KIDNEY INJURY): Status: RESOLVED | Noted: 2025-01-01 | Resolved: 2025-01-01

## 2025-03-03 PROBLEM — J18.9 HEALTHCARE-ASSOCIATED PNEUMONIA: Status: RESOLVED | Noted: 2025-03-03 | Resolved: 2025-03-03

## 2025-03-03 PROBLEM — Z71.89 GOALS OF CARE, COUNSELING/DISCUSSION: Status: RESOLVED | Noted: 2025-02-27 | Resolved: 2025-03-03

## 2025-03-03 PROBLEM — J18.9 HEALTHCARE-ASSOCIATED PNEUMONIA: Status: ACTIVE | Noted: 2025-03-03

## 2025-03-03 PROBLEM — J44.1 COPD EXACERBATION (HCC): Status: RESOLVED | Noted: 2025-01-01 | Resolved: 2025-01-01

## 2025-03-03 PROBLEM — J18.9 HCAP (HEALTHCARE-ASSOCIATED PNEUMONIA): Status: RESOLVED | Noted: 2025-01-01 | Resolved: 2025-01-01

## 2025-03-03 NOTE — PROGRESS NOTES
[facility]  Progress Note    Chief complaint :  Chief Complaint   Patient presents with    Respiratory Distress     Came from [place] on nonrebreather at 10L. Normally wears 4-5 lpm. Lethargic,        Subjective:    No overnight problems.   Patient describes feeling better. Patient denies chest pain, nausea, vomiting, bloody stools, fever, chills, changes in urination, changes in BM. Patient is tolerating diet.  Patient mentions that shortness of breath is better than when she came in.  As per nurse, patient did not wear the BiPAP overnight and the patient had black tarry stools.    Past medical, surgical, family and social history were reviewed, non-contributory, and unchanged unless otherwise stated.    Review of Systems    Negative unless stated above.    Objective:  /81   Pulse 91   Temp 97.8 °F (36.6 °C) (Temporal)   Resp 22   Ht 1.651 m (5' 5\")   Wt 68.5 kg (151 lb)   SpO2 99%   BMI 25.13 kg/m²     Physical Exam    Vitals reviewed.   HENT:      Head: Normocephalic.      Right Ear: Tympanic membrane normal.      Left Ear: Tympanic membrane normal.      Mouth/Throat:      Mouth: Mucous membranes are moist.   Eyes:      Extraocular Movements: Extraocular movements intact.      Pupils: Pupils are equal, round, and reactive to light.   Cardiovascular:      Rate and Rhythm: Normal rate. Rhythm irregular.      Pulses: Normal pulses.      Heart sounds: Normal heart sounds.   Pulmonary:      Breath sounds: Wheezing (B/l diffuse scattered wheezes) present, improved since admission. B/l basal crepitations present.   Abdominal:      Palpations: Abdomen is soft.   Genitourinary:     Comments: deferred  Musculoskeletal:         General: Normal range of motion.      Cervical back: Normal range of motion.      Comments: Left BKA   Skin:     General: Skin is warm.      Capillary Refill: Capillary refill takes less than 2 seconds.   Neurological:      General: No focal deficit  present.      Mental Status: She is alert and oriented to person, place, and time.   Psychiatric:         Mood and Affect: Anxious    Labs:  Recent Results (from the past 24 hour(s))   CBC with Auto Differential    Collection Time: 03/03/25  3:49 AM   Result Value Ref Range    WBC 12.3 (H) 4.5 - 11.5 k/uL    RBC 2.99 (L) 3.50 - 5.50 m/uL    Hemoglobin 8.0 (L) 11.5 - 15.5 g/dL    Hematocrit 28.8 (L) 34.0 - 48.0 %    MCV 96.3 80.0 - 99.9 fL    MCH 26.8 26.0 - 35.0 pg    MCHC 27.8 (L) 32.0 - 34.5 g/dL    RDW 20.2 (H) 11.5 - 15.0 %    Platelets 215 130 - 450 k/uL    MPV 8.9 7.0 - 12.0 fL    Neutrophils % 93 (H) 43.0 - 80.0 %    Lymphocytes % 4 (L) 20.0 - 42.0 %    Monocytes % 2 2.0 - 12.0 %    Eosinophils % 0 0 - 6 %    Basophils % 0 0.0 - 2.0 %    Myelocytes 2 (H) 0 %    Neutrophils Absolute 11.44 (H) 1.80 - 7.30 k/uL    Lymphocytes Absolute 0.43 (L) 1.50 - 4.00 k/uL    Monocytes Absolute 0.21 0.10 - 0.95 k/uL    Eosinophils Absolute 0.00 (L) 0.05 - 0.50 k/uL    Basophils Absolute 0.00 0.00 - 0.20 k/uL    Myelocytes Absolute 0.21 (H) 0 k/uL    RBC Morphology 2+ ANISOCYTOSIS     RBC Morphology 1+ HYPOCHROMIA     RBC Morphology 1+ POIKILOCYTOSIS     RBC Morphology 1+ POLYCHROMASIA     RBC Morphology 1+ BASOPHILIC STIPPLING     RBC Morphology 1+ STOMATOCYTES     RBC Morphology 1+ TARGET CELLS    Comprehensive Metabolic Panel w/ Reflex to MG    Collection Time: 03/03/25  3:49 AM   Result Value Ref Range    Sodium 142 132 - 146 mmol/L    Potassium 4.9 3.5 - 5.0 mmol/L    Chloride 102 98 - 107 mmol/L    CO2 31 (H) 22 - 29 mmol/L    Anion Gap 9 7 - 16 mmol/L    Glucose 176 (H) 74 - 99 mg/dL    BUN 49 (H) 6 - 23 mg/dL    Creatinine 1.1 (H) 0.50 - 1.00 mg/dL    Est, Glom Filt Rate 50 (L) >60 mL/min/1.73m2    Calcium 9.0 8.6 - 10.2 mg/dL    Total Protein 5.9 (L) 6.4 - 8.3 g/dL    Albumin 3.1 (L) 3.5 - 5.2 g/dL    Total Bilirubin 0.2 0.0 - 1.2 mg/dL    Alkaline Phosphatase 65 35 - 104 U/L    ALT 11 0 - 32 U/L    AST 10 0 - 31 U/L

## 2025-03-03 NOTE — PROGRESS NOTES
Physician Progress Note      PATIENT:               MICHAEL CRESPO  CSN #:                  [id number]  :                       1940  ADMIT DATE:       2025 1:51 PM  DISCH DATE:  RESPONDING  PROVIDER #:        INDIANA CAMILO          QUERY TEXT:    Pt admitted with Pneumonia and is being treated with IV Cefepime and po   Linezolid.  If possible, please document in the progress notes and discharge   summary if you are evaluating and/or treating any of the following:    Note: CAP and HCAP indicate where the pneumonia was acquired, not a specific   type.    The medical record reflects the following:  Risk Factors: COPD exacerbation, pneumonia  Clinical Indicators: Pt admitted with pneumonia, ID consulted and changed   antibiotics to IV Cefepime and po Linezolid  Treatment: IV Cefepime and po Linezolid  Options provided:  -- Gram negative pneumonia  -- Unspecified pneumonia  -- Other - I will add my own diagnosis  -- Disagree - Not applicable / Not valid  -- Disagree - Clinically unable to determine / Unknown  -- Refer to Clinical Documentation Reviewer    PROVIDER RESPONSE TEXT:    This patient has unspecified pneumonia.    Query created by: Praul Brown on 3/3/2025 4:18 PM      Electronically signed by:  INDIANA CAMILO 3/3/2025 4:35   PM

## 2025-03-03 NOTE — CARE COORDINATION
3/3/2025  Social Work Discharge Planning:Plan is to go to [facility] at discharge. Precert auth has been waived. [id number],  jaya and transport form are in chart. Electronically signed by DESIRE Torres on 3/3/2025 at 9:48 AM

## 2025-03-03 NOTE — PROGRESS NOTES
Physician Progress Note      PATIENT:               MICHAEL CRESPO  CSN #:                  [id number]  :                       1940  ADMIT DATE:       2025 1:51 PM  DISCH DATE:  RESPONDING  PROVIDER #:        INDIANA CAMILO          QUERY TEXT:    Pt admitted with Pneumonia and is being treated with IV Cefepime and po   Linezolid.  If possible, please document in the progress notes and discharge   summary if you are evaluating and/or treating any of the following:    Note: CAP and HCAP indicate where the pneumonia was acquired, not a specific   type.    The medical record reflects the following:  Risk Factors: COPD exacerbation, pneumonia  Clinical Indicators: Pt admitted with pneumonia, ID consulted and changed   antibiotics to IV Cefepime and po Linezolid  Treatment: IV Cefepime and po Linezolid  Options provided:  -- Gram negative pneumonia  -- Unspecified pneumonia  -- Other - I will add my own diagnosis  -- Disagree - Not applicable / Not valid  -- Disagree - Clinically unable to determine / Unknown  -- Refer to Clinical Documentation Reviewer    PROVIDER RESPONSE TEXT:    HCAP.  Please refer to ID note for more information.    Query created by: Parul Brown on 2025 10:39 AM      Electronically signed by:  INDIANA CAMILO 3/3/2025 3:52   PM

## 2025-03-03 NOTE — PLAN OF CARE
Problem: Safety - Adult  Goal: Free from fall injury  Outcome: Progressing     Problem: Discharge Planning  Goal: Discharge to home or other facility with appropriate resources  Outcome: Progressing     Problem: Skin/Tissue Integrity  Goal: Skin integrity remains intact  Description: 1.  Monitor for areas of redness and/or skin breakdown  2.  Assess vascular access sites hourly  3.  Every 4-6 hours minimum:  Change oxygen saturation probe site  4.  Every 4-6 hours:  If on nasal continuous positive airway pressure, respiratory therapy assess nares and determine need for appliance change or resting period  Outcome: Progressing  Flowsheets (Taken 3/2/2025 0845 by Niyah Felix, RN)  Skin Integrity Remains Intact:   Monitor for areas of redness and/or skin breakdown   Assess vascular access sites hourly     Problem: Nutrition Deficit:  Goal: Optimize nutritional status  Outcome: Progressing     Problem: Respiratory - Adult  Goal: Achieves optimal ventilation and oxygenation  3/2/2025 2204 by Abisai Lambert, RN  Outcome: Progressing  3/2/2025 0849 by Niyah Felix, RN  Outcome: Progressing

## 2025-03-03 NOTE — DISCHARGE SUMMARY
Physician Discharge Summary  [facility]     Patient ID:  Priya Garcia  [id number]  84 y.o.  1940    Admit date: 2/25/2025    Discharge date: 3/5/2025    Admission Diagnoses:   COPD exacerbation (HCC) [J44.1]  Acute on chronic respiratory failure with hypoxia and hypercapnia (HCC) [J96.21, J96.22]  Acute kidney injury superimposed on CKD [N17.9, N18.9]  Pneumonia due to infectious organism, unspecified laterality, unspecified part of lung [J18.9]    Discharge Diagnoses:  Principal Problem:    Chronic respiratory failure with hypoxia (HCC)  Active Problems:    Anxiety    Chronic obstructive pulmonary disease (HCC)    History of DVT (deep vein thrombosis)    Hypertension    Agoraphobia    Peripheral arterial disease    Anemia    Palliative care encounter    Atrial fibrillation (HCC)  Resolved Problems:    GILDA (acute kidney injury)    COPD exacerbation (HCC)    HCAP (healthcare-associated pneumonia)    Goals of care, counseling/discussion    Healthcare-associated pneumonia    Delirium      Consults: pulmonary/intensive care, ID, urology and general surgery  Procedures:     UGI scopy and Colonoscopy by  on 3/5/2024    Hospital Course:     Priya Garcia  is a 84 y.o. female patient of David Coffey MD  with a pertinent PMHx of COPD with 45 pack year smoking history but quit 23 years ago,Anemia, GI Bleed 2/2 Gastritis, HTN, Anxiety, H/o DVT,PAD, HLD, Left BKA in 2017 and was recently admitted (2/17 -2/20) for CO2 retention/COPD exacerbation and discharged to [facility] was brought in to the ED with chief complaints of SOB x5 days duration of increasing severity with dry cough.  In the ED, patient had a pCO2 of 106, CTA pulm in the ED showed a new consolidation within the posterior medial, inferior left lower lobe consistent with pneumonia and a small pleural effusion which was a new finding when compared to CT during a previous admission.   alert and oriented to person, place, and time.   Psychiatric:         Mood and Affect: Anxious    Disposition: Rehab  Patient condition: stable    Patient Instructions:   Activity: activity as tolerated  Diet: regular diet    Discharge Medication List:    Discharge Medication List as of 3/5/2025  5:11 PM         Discharge Medication List as of 3/5/2025  5:11 PM         Discharge Medication List as of 3/5/2025  5:11 PM        STOP taking these medications       amLODIPine (NORVASC) 5 MG tablet Comments:   Reason for Stopping:         losartan (COZAAR) 50 MG tablet Comments:   Reason for Stopping:         acetaZOLAMIDE (DIAMOX) 250 MG tablet Comments:   Reason for Stopping:             Discharge Medication List as of 3/5/2025  5:11 PM        CONTINUE these medications which have CHANGED    Details   aspirin 81 MG chewable tablet Take 1 tablet by mouth dailyDC to [facility]      ALPRAZolam (XANAX) 0.25 MG tablet TAKE 1 TAB BY MOUTH NIGHTLY. MAY TAKE AN ADDITIONAL TAB AS NEEDED FOR LEAVING THE [facility]      dronedarone hcl (MULTAQ) 400 MG TABS Take 1 tablet by mouth 2 times daily (with meals)DC to [facility]      ipratropium 0.5 mg-albuterol 2.5 mg (DUONEB) 0.5-2.5 (3) MG/3ML SOLN nebulizer solution Inhale 3 mLs into the lungs every 4 hoursDC to [facility]      albuterol sulfate HFA (VENTOLIN HFA) 108 (90 Base) MCG/ACT inhaler Inhale 2 puffs into the lungs 4 times daily as needed for WheezingDC to [facility]      fluticasone-umeclidin-vilant (TRELEGY ELLIPTA) 200-62.5-25 MCG/ACT AEPB inhaler Inhale 1 puff into the lungs dailyDC to [facility]      sertraline (ZOLOFT) 100 MG tablet Take 1 tablet by mouth nightlyDC to [facility]      cetirizine (ZYRTEC) 10 MG tablet Take 1 tablet by mouth dailyDC to [facility]      ARIPiprazole (ABILIFY) 5 MG tablet Take 1 tablet by mouth at bedtimeDC to [facility]      metoprolol tartrate (LOPRESSOR) 25 MG tablet Take 1 tablet by mouth 2 times dailyDC to [facility]      dilTIAZem (CARDIZEM CD) 180 MG extended release capsule Take 1 capsule by

## 2025-03-03 NOTE — PROGRESS NOTES
[facility]  Department of Pulmonary, Critical Care and Sleep Medicine  [facility]  Department of Internal Medicine  Progress Note    SUBJECTIVE:    Priya is seen and examined for acute on chronic hypoxic respiratory failure. Has returned to her baseline 6L supplemental O2. States she has not been out of bed recently. Recommend out of bed for meals if tolerates.       OBJECTIVE:  Vitals:    03/03/25 0024 03/03/25 0030 03/03/25 0245 03/03/25 0725   BP:  (!) 101/57 113/69 112/81   Pulse:  83 82 91   Resp: 21 20 22 22   Temp:  98.2 °F (36.8 °C) 98.5 °F (36.9 °C) 97.8 °F (36.6 °C)   TempSrc:  Axillary Infrared Temporal   SpO2:  98% 96% 99%   Weight:       Height:         Constitutional: Alert, oriented, NAD  EENT: No icterus. No thrush. Dry MM    Neck: No thyromegaly. No elevated JVP. Trachea was midline.   Respiratory: Labored, diminished  Cardiovascular: Irregular, No murmur. No rubs.      Pulses:  Equal bilaterally.    Abdomen: Soft without organomegaly. No rebound, rigidity.  No guarding.  Lymphatic: No lymphadenopathy.  Musculoskeletal: Weakness / gross deficits  Extremities:  No lower extremity edema. Reflexes appear adequate.   Skin:  Warm and dry.  + rashes.   Neurological/Psychiatric: No acute psychosis. Cranial nerves are intact.        DATA:  The data collected below information that was obtained, reviewed, analyzed and interpreted today. Imaging test are reviewed with the radiologist during weekly conference rounds. Comparison to previous images are always explored.     Monitor Strips:  My interpretation and reviewed of the cardiac monitor and further discussion with technical team reveals no changes noted and the patient is in sinus rhythm     RADIOLOGY:  My interpretation and review of the current films reveals  New consolidation within the posteromedial, inferior left lower lobe consistent with pneumonia.  There is a very small left pleural  compressions, no cardioversion   - no role for bronchoscopy at this time    I have spent a total time of 35 minutes of this patient encounter reviewing chart, labs, coordinating care with interdisciplinary teams, face to face encounter with patient, providing counseling/education to patient/family.     Ulices Torres MD  3/3/2025  2:31 PM

## 2025-03-03 NOTE — PROGRESS NOTES
[facility]  [facility]  Progress Note    SUBJECTIVE:  Chief Complaint   Patient presents with    Respiratory Distress     Came from [facility] on nonrebreather at 10L. Normally wears 4-5 lpm. Lethargic,      The patient is feeling better.  Tolerating antibiotics.  No fever.    Review of systems:  As stated above in the chief complaint, otherwise negative.    Medications:  Scheduled Meds:   predniSONE  20 mg Oral Daily    ipratropium 0.5 mg-albuterol 2.5 mg  1 Dose Inhalation Q4H RT    ceFEPIme (MAXIPIME) 2,000 mg in sterile water 20 mL IV syringe  2,000 mg IntraVENous Q12H    linezolid  600 mg Oral 2 times per day    ARIPiprazole  5 mg Oral Nightly    cetirizine  10 mg Oral Daily    dilTIAZem  180 mg Oral BID    dronedarone hcl  400 mg Oral BID WC    metoprolol tartrate  25 mg Oral BID    sertraline  100 mg Oral Nightly    sodium chloride flush  5-40 mL IntraVENous 2 times per day    arformoterol tartrate  15 mcg Nebulization BID RT    budesonide  0.5 mg Nebulization BID RT    pantoprazole (PROTONIX) 40 mg in sodium chloride (PF) 0.9 % 10 mL injection  40 mg IntraVENous Daily    amLODIPine  5 mg Oral Daily     Continuous Infusions:   sodium chloride      sodium chloride       PRN Meds:white petrolatum, sodium chloride, ALPRAZolam, ammonium lactate, sodium chloride flush, sodium chloride, ondansetron **OR** ondansetron, polyethylene glycol, acetaminophen **OR** acetaminophen    OBJECTIVE:  /81   Pulse 91   Temp 97.8 °F (36.6 °C) (Temporal)   Resp 22   Ht 1.651 m (5' 5\")   Wt 68.5 kg (151 lb)   SpO2 99%   BMI 25.13 kg/m²   Temp  Av.1 °F (36.7 °C)  Min: 97.8 °F (36.6 °C)  Max: 98.5 °F (36.9 °C)  Constitutional: The patient is sitting up in bed.  She is awake and alert.  No distress.  O2 by nasal cannula.  Skin: Warm and dry. No rashes were noted.   HEENT: Round and reactive pupils.  Moist mucous membranes.  No ulcerations or thrush.  Neck: Supple to movements.   Chest:

## 2025-03-03 NOTE — PROGRESS NOTES
03/02/25 2222   NIV Type   NIV Started/Stopped On   Equipment Type v60   Mode AVAPS   Mask Type Full face mask   Mask Size Medium   Assessment   Level of Consciousness 0   Comfort Level Good   Using Accessory Muscles No   Mask Compliance Good   Skin Assessment Clean, dry, & intact   Skin Protection for O2 Device Yes   Orientation Middle   Location Nose   Intervention(s) Skin Barrier   Settings/Measurements   PIP Observed 18 cm H20   CPAP/EPAP 6 cmH2O   IPAP Min 16 cmH2O   IPAP Max 25 cmH2O   Vt (Set, mL) 450 mL   Vt (Measured) 471 mL   Rate Ordered 20   Insp Rise Time (%) 3 %   FiO2  40 %   I Time/ I Time % 0.9 s   Minute Volume (L/min) 9.8 Liters   Mask Leak (lpm) 47 lpm   Patient's Home Machine No   Alarm Settings   Alarms On Y

## 2025-03-03 NOTE — PLAN OF CARE
Problem: Safety - Adult  Goal: Free from fall injury  3/3/2025 1125 by Niyah Felix, RN  Outcome: Progressing  3/2/2025 2204 by Abisai Lambert, RN  Outcome: Progressing

## 2025-03-03 NOTE — PROGRESS NOTES
GENERAL SURGERY  DAILY PROGRESS NOTE    Patient's Name/Date of Birth: Priya Garcia / 1940    Date: March 3, 2025     Chief Complaint   Patient presents with    Respiratory Distress     Came from [facility] on nonrebreather at 10L. Normally wears 4-5 lpm. Lethargic,         Subjective:  Denies any nausea or emesis. No dark or bloody bowel movements      Objective:  Last 24Hrs  Temp  Av.1 °F (36.7 °C)  Min: 97.8 °F (36.6 °C)  Max: 98.5 °F (36.9 °C)  Resp  Av.3  Min: 20  Max: 33  Pulse  Av.3  Min: 82  Max: 120  Systolic (24hrs), Av , Min:101 , Max:136     Diastolic (24hrs), Av, Min:57, Max:87    SpO2  Av.4 %  Min: 91 %  Max: 100 %    I/O last 3 completed shifts:  In: 2960 [P.O.:2960]  Out: 200 [Urine:200]      General: resting in bed  Cardiovascular: Warm throughout, no edema  Respiratory: no respiratory distress, equal chest rise on 6L  Abdomen: soft,  nontender, nondistended  Skin: no obvious rashes or lesions appreciated, no jaundice  Extremities: moving all extremities      CBC  Recent Labs     25   WBC 14.1* 12.9* 12.3*   RBC 2.98* 2.99* 2.99*   HGB 8.0* 8.0* 8.0*   HCT 28.6* 28.5* 28.8*   MCV 96.0 95.3 96.3   MCH 26.8 26.8 26.8   MCHC 28.0* 28.1* 27.8*   RDW 21.2* 20.6* 20.2*    235 215   MPV 8.8 9.2 8.9       CMP  Recent Labs     25    143 142   K 4.1 4.6 4.9    105 102   CO2 32* 32* 31*   BUN 48* 49* 49*   CREATININE 1.1* 1.1* 1.1*   GLUCOSE 124* 128* 176*   CALCIUM 9.2 9.2 9.0   BILITOT 0.2 0.2 0.2   ALKPHOS 57 66 65   AST 9 10 10   ALT 11 12 11         Assessment/Plan:    Patient Active Problem List   Diagnosis    Anxiety    Chronic obstructive pulmonary disease (HCC)    History of DVT (deep vein thrombosis)    Hypertension    Agoraphobia    History of below-knee amputation of left lower extremity (HCC)    EKG abnormalities    Peripheral arterial disease    Chronic  respiratory failure with hypoxia (HCC)    Pure hypercholesterolemia    Anemia    Coronary atherosclerosis    Peripheral vascular disease    Acute on chronic respiratory failure with hypoxia and hypercapnia (HCC)    GILDA (acute kidney injury)    COPD exacerbation (HCC)    HCAP (healthcare-associated pneumonia)    Palliative care encounter    Goals of care, counseling/discussion       84 y.o. female admitted for COPD with anemia     - Hb is stable, has been at 8   - PPI daily   - will plan for EGD and colonoscopy once respiratory status improves   - monitor hemodynamics   - okay for diet from surgical POV at this time   - will discuss with Dr. Bryan Brown DO  General Surgery Resident, PGY-2  Electronically signed on 3/3/2025 at 7:27 AM     I saw and examined the patient. I reviewed the above resident's note. All available labs and pathology were reviewed. All imaging was independently reviewed and interpreted. All provider notes were reviewed. The above was discussed with the patient at length.I agree with the assessment and plan as outlined.    Can proceed with scopes on Wednesday if cleared from a pulmonary standpoint and patient wishes to proceed.    Elizabeth Adams MD  General Surgery

## 2025-03-03 NOTE — FLOWSHEET NOTE
Inpatient Wound Care (initial consult) 407    Admit Date: 2/25/2025  1:51 PM    Reason for consult:  right heel    Patient laying down in bed, awake, alert and answers appropriately. Assist of one to roll.     Significant history:  per H&P    Chief Complaint   Patient presents with    Respiratory Distress       Came from [facility] on nonrebreather at 10L. Normally wears 4-5 lpm. Lethargic,       History of Present Illness:   Priya Garcia  is a 84 y.o. female patient of David Coffey MD  with a pertinent PMHx of COPD with 45 pack year smoking history but quit 23 years ago,Anemia, GI Bleed 2/2 Gastritis, HTN, Anxiety, H/o DVT,PAD, HLD, Left BKA in 2017 and was recently admitted (2/17 -2/20) for CO2 retention/COPD exacerbation and discharged to [facility] was brought in to the ED with chief complaints of SOB x5 days duration of increasing severity with dry cough. Patient mentions of possible exposure to sick contacts, given that there were other residents in the facility.    Past Medical History:   Diagnosis Date    Anxiety     COPD (chronic obstructive pulmonary disease) (HCC)     Hx of blood clots      Findings:     03/03/25 1039   Skin Integumentary    Skin Integrity Ecchymosis   Location BUE, BLE   Skin Integrity Site 2   Skin Integrity Location 2 Redness;Other (comment)  (with blanching)   Location 2 right heel   Skin Integrity Site 4   Skin Integrity Location 4 Abrasion;Other (comment)  (intact dry scabs)   Location 4 left BKA   Skin Integrity Site 5   Skin Integrity Location 5 Redness;Other (comment)  (with blanching, dry thin intact scabs, chronic skin irritation)   Location 5 coccyx   Wound 03/01/25 Coccyx   Date First Assessed/Time First Assessed: 03/01/25 0800   Present on Original Admission: No  Primary Wound Type: (c) Other (comment)  Location: Coccyx   Wound Image    Wound Etiology Other  (chronic skin irritation)   Dressing/Treatment Open to air   Wound Assessment Other

## 2025-03-04 NOTE — PROGRESS NOTES
GENERAL SURGERY  DAILY PROGRESS NOTE    Patient's Name/Date of Birth: Priya Garcia / 1940    Date: 2025     Chief Complaint   Patient presents with    Respiratory Distress     Came from [facility] on nonrebreather at 10L. Normally wears 4-5 lpm. Lethargic,         Subjective:  Having some lower abdominal discomfort his morning. Denies any nausea or emesis. No dark or bloody bowel movements. Has been passing flatus.      Objective:  Last 24Hrs  Temp  Av.7 °F (36.5 °C)  Min: 97.3 °F (36.3 °C)  Max: 98.1 °F (36.7 °C)  Resp  Av  Min: 18  Max: 24  Pulse  Av.2  Min: 81  Max: 96  Systolic (24hrs), Av , Min:108 , Max:131     Diastolic (24hrs), Av, Min:65, Max:81    SpO2  Av.8 %  Min: 96 %  Max: 100 %    I/O last 3 completed shifts:  In: 3800 [P.O.:3800]  Out: 200 [Urine:200]      General: resting in bed  Cardiovascular: Warm throughout, no edema  Respiratory: no respiratory distress, equal chest rise on 5L  Abdomen: soft,  nontender, nondistended  Skin: no obvious rashes or lesions appreciated, no jaundice  Extremities: moving all extremities      CBC  Recent Labs     25  0349 25  0303   WBC 12.9* 12.3* 11.9*   RBC 2.99* 2.99* 3.06*   HGB 8.0* 8.0* 8.2*   HCT 28.5* 28.8* 29.5*   MCV 95.3 96.3 96.4   MCH 26.8 26.8 26.8   MCHC 28.1* 27.8* 27.8*   RDW 20.6* 20.2* 19.7*    215 209   MPV 9.2 8.9 8.7       CMP  Recent Labs     25  0349 25  0303    142 140   K 4.6 4.9 4.8    102 102   CO2 32* 31* 32*   BUN 49* 49* 43*   CREATININE 1.1* 1.1* 1.1*   GLUCOSE 128* 176* 101*   CALCIUM 9.2 9.0 9.3   BILITOT 0.2 0.2 0.2   ALKPHOS 66 65 71   AST 10 10 13   ALT 12 11 11         Assessment/Plan:    Patient Active Problem List   Diagnosis    Anxiety    Chronic obstructive pulmonary disease (HCC)    History of DVT (deep vein thrombosis)    Hypertension    Agoraphobia    History of below-knee amputation of left lower extremity

## 2025-03-04 NOTE — PROGRESS NOTES
03/03/25 2228   NIV Type   NIV Started/Stopped On   Equipment Type v60   Mode AVAPS   Mask Type Full face mask   Mask Size Medium   Assessment   Level of Consciousness 0   Comfort Level Good   Using Accessory Muscles No   Mask Compliance Good   Skin Assessment Clean, dry, & intact   Skin Protection for O2 Device Yes   Orientation Middle   Location Nose   Intervention(s) Skin Barrier   Settings/Measurements   PIP Observed 16 cm H20   CPAP/EPAP 6 cmH2O   IPAP Min 16 cmH2O   IPAP Max 25 cmH2O   Vt (Set, mL) 450 mL   Vt (Measured) 517 mL   Rate Ordered 20   Insp Rise Time (%) 3 %   FiO2  40 %   I Time/ I Time % 0.9 s   Minute Volume (L/min) 12.5 Liters   Mask Leak (lpm) 43 lpm   Patient's Home Machine No   Alarm Settings   Alarms On Y

## 2025-03-04 NOTE — PROGRESS NOTES
Notified Dr. Carrero regarding placement of an FMS. New orders obtained. Informed Dr. Janet Roblero as well.

## 2025-03-04 NOTE — PROGRESS NOTES
Notified Dr. Adams that patient is refusing the rest of her preparation for her scope tomorrow and would like to speak with her.

## 2025-03-04 NOTE — PROGRESS NOTES
[facility]  Progress Note    Chief complaint :  Chief Complaint   Patient presents with    Respiratory Distress     Came from [place] on nonrebreather at 10L. Normally wears 4-5 lpm. Lethargic,        Subjective:    No overnight problems. Patient describes feeling  better from a breathing standpoint .   Patient mentions having lower abdominal pain since 3 AM today.   Patient denies chest pain, SOB, nausea, vomiting, bloody stools, fever, chills, changes in urination. Patient is on CLD for scopes tomorrow.     Past medical, surgical, family and social history were reviewed, non-contributory, and unchanged unless otherwise stated.    Review of Systems    Negative unless stated above    Objective:  /80   Pulse 96   Temp 97.4 °F (36.3 °C) (Oral)   Resp 18   Ht 1.651 m (5' 5\")   Wt 68.5 kg (151 lb)   SpO2 97%   BMI 25.13 kg/m²     Physical Exam    Vitals reviewed.   HENT:      Head: Normocephalic.       Mouth/Throat:      Mouth: Mucous membranes are moist.   Eyes:      Extraocular Movements: Extraocular movements intact.      Pupils: Pupils are equal, round, and reactive to light.   Cardiovascular:      Rate and Rhythm: Normal rate. Rhythm irregular.      Pulses: Normal pulses.      Heart sounds: Normal heart sounds.   Pulmonary:      Breath sounds: Very minimal b/l scattered wheeze with basal crepitations, improved since admission.   Abdominal:      Palpations: Abdomen is tender to lower quadrants, more in the left lower quadrant.    Genitourinary:     Comments: deferred  Musculoskeletal:         General: Normal range of motion.      Cervical back: Normal range of motion.      Comments: Left BKA   Skin:     General: Skin is warm.      Capillary Refill: Capillary refill takes less than 2 seconds.   Neurological:      General: No focal deficit present.      Mental Status: She is alert and oriented to person, place, and time.   Psychiatric:         Mood and Affect:  Schedule MWV for 20 min in July Anxious    Labs:  Recent Results (from the past 24 hour(s))   CBC with Auto Differential    Collection Time: 03/04/25  3:03 AM   Result Value Ref Range    WBC 11.9 (H) 4.5 - 11.5 k/uL    RBC 3.06 (L) 3.50 - 5.50 m/uL    Hemoglobin 8.2 (L) 11.5 - 15.5 g/dL    Hematocrit 29.5 (L) 34.0 - 48.0 %    MCV 96.4 80.0 - 99.9 fL    MCH 26.8 26.0 - 35.0 pg    MCHC 27.8 (L) 32.0 - 34.5 g/dL    RDW 19.7 (H) 11.5 - 15.0 %    Platelets 209 130 - 450 k/uL    MPV 8.7 7.0 - 12.0 fL    Neutrophils % 92 (H) 43.0 - 80.0 %    Lymphocytes % 3 (L) 20.0 - 42.0 %    Monocytes % 4 2.0 - 12.0 %    Eosinophils % 0 0 - 6 %    Basophils % 0 0.0 - 2.0 %    Myelocytes 1 (H) 0 %    Neutrophils Absolute 10.97 (H) 1.80 - 7.30 k/uL    Lymphocytes Absolute 0.31 (L) 1.50 - 4.00 k/uL    Monocytes Absolute 0.52 0.10 - 0.95 k/uL    Eosinophils Absolute 0.00 (L) 0.05 - 0.50 k/uL    Basophils Absolute 0.00 0.00 - 0.20 k/uL    Myelocytes Absolute 0.10 (H) 0 k/uL    RBC Morphology 2+ ANISOCYTOSIS     RBC Morphology 1+ HYPOCHROMIA     RBC Morphology 1+ OVALOCYTES     RBC Morphology 1+ POIKILOCYTOSIS     RBC Morphology 1+ POLYCHROMASIA     RBC Morphology 1+ BASOPHILIC STIPPLING     RBC Morphology 1+ STOMATOCYTES     RBC Morphology 1+ TEARDROPS    Comprehensive Metabolic Panel w/ Reflex to MG    Collection Time: 03/04/25  3:03 AM   Result Value Ref Range    Sodium 140 132 - 146 mmol/L    Potassium 4.8 3.5 - 5.0 mmol/L    Chloride 102 98 - 107 mmol/L    CO2 32 (H) 22 - 29 mmol/L    Anion Gap 6 (L) 7 - 16 mmol/L    Glucose 101 (H) 74 - 99 mg/dL    BUN 43 (H) 6 - 23 mg/dL    Creatinine 1.1 (H) 0.50 - 1.00 mg/dL    Est, Glom Filt Rate 50 (L) >60 mL/min/1.73m2    Calcium 9.3 8.6 - 10.2 mg/dL    Total Protein 5.8 (L) 6.4 - 8.3 g/dL    Albumin 3.3 (L) 3.5 - 5.2 g/dL    Total Bilirubin 0.2 0.0 - 1.2 mg/dL    Alkaline Phosphatase 71 35 - 104 U/L    ALT 11 0 - 32 U/L    AST 13 0 - 31 U/L       Radiology and other tests reviewed:  CTA PULMONARY W CONTRAST   Final Result

## 2025-03-04 NOTE — PLAN OF CARE
Problem: Safety - Adult  Goal: Free from fall injury  3/4/2025 0829 by Niyah Felix, RN  Outcome: Progressing  3/3/2025 2158 by Odette Torres, RN  Outcome: Progressing

## 2025-03-04 NOTE — PLAN OF CARE
Problem: Safety - Adult  Goal: Free from fall injury  3/3/2025 2158 by Odette Torres, RN  Outcome: Progressing  3/3/2025 1125 by Niyah Felix, RN  Outcome: Progressing     Problem: Discharge Planning  Goal: Discharge to home or other facility with appropriate resources  Outcome: Progressing     Problem: Skin/Tissue Integrity  Goal: Skin integrity remains intact  Description: 1.  Monitor for areas of redness and/or skin breakdown  2.  Assess vascular access sites hourly  3.  Every 4-6 hours minimum:  Change oxygen saturation probe site  4.  Every 4-6 hours:  If on nasal continuous positive airway pressure, respiratory therapy assess nares and determine need for appliance change or resting period  Outcome: Progressing  Flowsheets (Taken 3/3/2025 0800 by Niyah Felix, RN)  Skin Integrity Remains Intact:   Monitor for areas of redness and/or skin breakdown   Assess vascular access sites hourly

## 2025-03-04 NOTE — PROGRESS NOTES
edema.  Lines: Peripheral.    Laboratory and Tests:  Lab Results   Component Value Date    CRP 94.0 (H) 02/25/2025     Lab Results   Component Value Date    SEDRATE 34 (H) 02/26/2025       Radiology:  Reviewed     Microbiology:   Urine antigens: Canceled  Blood cultures 2/25/2025: Staphylococcus species in 1 of 4 bottles    ASSESSMENT:  HCAP left lower lobe-improved.  Completed 7 days of empiric antibiotics  Leukocytosis secondary to HCAP-improved  Acute kidney injury-improved  Staphylococcal bacteremia.  This is a contaminant    PLAN:  No further recommendations  ID is signed off    Spoke with nursing.    Meng Mandel MD  11:06 AM  3/4/2025

## 2025-03-04 NOTE — PROGRESS NOTES
[facility]  Progress Note    Chief complaint :  Chief Complaint   Patient presents with    Respiratory Distress     Came from [place] on nonrebreather at 10L. Normally wears 4-5 lpm. Lethargic,        Subjective:    No overnight problems.   Overnight, nurse describes one episode of desaturation to 88%(goal SpO2 88-92%) and hence oxygen was turned up to 12L. After examining the patient, oxygen was reduced to 5L and patient was saturating at 100%. Requested to inform the primary team incase increasing oxygen requirement was noticed.   Patient also had one episode of urine retention requiring straight cath - urinary retention of 400mL was noted.   Patient describes feeling better. Patient denies chest pain, SOB, nausea, vomiting, bloody stools, fever, chills, changes in urination, changes in BM. Patient is NPO in plan for scopes today.  As per nurse, patient had multiple bowel movements yesterday after bowel prep.     Past medical, surgical, family and social history were reviewed, non-contributory, and unchanged unless otherwise stated.    Review of Systems    Negative unless stated above    Objective:  BP (!) 144/61   Pulse 85   Temp 97.3 °F (36.3 °C) (Oral)   Resp 16   Ht 1.651 m (5' 5\")   Wt 68.5 kg (151 lb)   SpO2 91%   BMI 25.13 kg/m²     Physical Exam    Vitals reviewed.   HENT:      Head: Normocephalic.       Mouth/Throat:      Mouth: Mucous membranes are moist.   Eyes:      Extraocular Movements: Extraocular movements intact.      Pupils: Pupils are equal, round, and reactive to light.   Cardiovascular:      Rate and Rhythm: Normal rate. Rhythm irregular.      Pulses: Normal pulses.      Heart sounds: Normal heart sounds.   Pulmonary:      Breath sounds: Very minimal b/l scattered wheeze with basal crepitations, improved since admission.   Abdominal:      Palpations: Abdomen is tender to lower quadrants, more in the left lower quadrant.    Genitourinary:

## 2025-03-04 NOTE — PROGRESS NOTES
[facility]  Department of Pulmonary, Critical Care and Sleep Medicine  [facility]  Department of Internal Medicine  Progress Note    SUBJECTIVE:    Priya is seen and examined for acute on chronic hypoxic respiratory failure. Has returned to her baseline 6L supplemental O2. States she has not been out of bed recently. Recommend out of bed for meals if tolerates.       OBJECTIVE:  Vitals:    03/03/25 2035 03/04/25 0010 03/04/25 0530 03/04/25 0713   BP:  111/68 111/80 (!) 132/98   Pulse:  94 96 95   Resp:  20 18 18   Temp:  98.1 °F (36.7 °C) 97.4 °F (36.3 °C) 97.3 °F (36.3 °C)   TempSrc:  Infrared Oral Temporal   SpO2: 96% 96% 97% 100%   Weight:       Height:         Constitutional: Alert, oriented, NAD  EENT: No icterus. No thrush. Dry MM    Neck: No thyromegaly. No elevated JVP. Trachea was midline.   Respiratory: Labored, diminished  Cardiovascular: Irregular, No murmur. No rubs.      Pulses:  Equal bilaterally.    Abdomen: Soft without organomegaly. No rebound, rigidity.  No guarding.  Lymphatic: No lymphadenopathy.  Musculoskeletal: Weakness / gross deficits  Extremities:  No lower extremity edema. Reflexes appear adequate.   Skin:  Warm and dry.  + rashes.   Neurological/Psychiatric: No acute psychosis. Cranial nerves are intact.        DATA:  The data collected below information that was obtained, reviewed, analyzed and interpreted today. Imaging test are reviewed with the radiologist during weekly conference rounds. Comparison to previous images are always explored.     Monitor Strips:  My interpretation and reviewed of the cardiac monitor and further discussion with technical team reveals no changes noted and the patient is in sinus rhythm     RADIOLOGY:  My interpretation and review of the current films reveals  New consolidation within the posteromedial, inferior left lower lobe consistent with pneumonia.  There is a very small left pleural

## 2025-03-04 NOTE — CARE COORDINATION
3/4/2025  Social Work Discharge Planning:Scope tomorrow. Plan is to return to [facility] at discharge. Precert auth has been waived. 7000, jaya and transport form are in chart. Electronically signed by DESIRE Torres on 3/4/2025 at 10:17 AM

## 2025-03-05 ENCOUNTER — ANESTHESIA EVENT (OUTPATIENT)
Dept: OPERATING ROOM | Age: 85
DRG: 193 | End: 2025-03-05
Payer: MEDICARE

## 2025-03-05 ENCOUNTER — ANESTHESIA (OUTPATIENT)
Dept: OPERATING ROOM | Age: 85
DRG: 193 | End: 2025-03-05
Payer: MEDICARE

## 2025-03-05 PROBLEM — R41.0 DELIRIUM: Status: RESOLVED | Noted: 2025-01-01 | Resolved: 2025-01-01

## 2025-03-05 PROBLEM — R41.0 DELIRIUM: Status: ACTIVE | Noted: 2025-03-05

## 2025-03-05 PROBLEM — I48.91 ATRIAL FIBRILLATION (HCC): Status: ACTIVE | Noted: 2025-03-05

## 2025-03-05 PROCEDURE — 6360000002 HC RX W HCPCS: Performed by: NURSE ANESTHETIST, CERTIFIED REGISTERED

## 2025-03-05 RX ORDER — PHENYLEPHRINE HCL IN 0.9% NACL 1 MG/10 ML
SYRINGE (ML) INTRAVENOUS
Status: DISCONTINUED | OUTPATIENT
Start: 2025-03-05 | End: 2025-03-05 | Stop reason: SDUPTHER

## 2025-03-05 RX ORDER — PROPOFOL 10 MG/ML
INJECTION, EMULSION INTRAVENOUS
Status: DISCONTINUED | OUTPATIENT
Start: 2025-03-05 | End: 2025-03-05 | Stop reason: SDUPTHER

## 2025-03-05 RX ADMIN — Medication 100 MCG: at 12:30

## 2025-03-05 RX ADMIN — Medication 100 MCG: at 12:11

## 2025-03-05 RX ADMIN — Medication 100 MCG: at 12:20

## 2025-03-05 RX ADMIN — PROPOFOL 210 MG: 10 INJECTION, EMULSION INTRAVENOUS at 12:07

## 2025-03-05 ASSESSMENT — COPD QUESTIONNAIRES: CAT_SEVERITY: SEVERE

## 2025-03-05 NOTE — PLAN OF CARE
Problem: Safety - Adult  Goal: Free from fall injury  3/5/2025 0936 by Vicenta Wise RN  Outcome: Progressing  3/4/2025 2123 by Odette Torres RN  Outcome: Progressing     Problem: Discharge Planning  Goal: Discharge to home or other facility with appropriate resources  3/5/2025 0936 by Vicenta Wise RN  Outcome: Progressing  3/4/2025 2123 by Odette Torres RN  Outcome: Progressing     Problem: Skin/Tissue Integrity  Goal: Skin integrity remains intact  Description: 1.  Monitor for areas of redness and/or skin breakdown  2.  Assess vascular access sites hourly  3.  Every 4-6 hours minimum:  Change oxygen saturation probe site  4.  Every 4-6 hours:  If on nasal continuous positive airway pressure, respiratory therapy assess nares and determine need for appliance change or resting period  3/5/2025 0936 by Vicenta Wise RN  Outcome: Progressing  3/4/2025 2123 by Odette Torres RN  Outcome: Progressing  Flowsheets (Taken 3/4/2025 0800 by Niyah Felix RN)  Skin Integrity Remains Intact:   Monitor for areas of redness and/or skin breakdown   Assess vascular access sites hourly     Problem: Respiratory - Adult  Goal: Achieves optimal ventilation and oxygenation  Outcome: Progressing

## 2025-03-05 NOTE — PROGRESS NOTES
GENERAL SURGERY  DAILY PROGRESS NOTE    Patient's Name/Date of Birth: Priya Garcia / 1940    Date: 2025     Chief Complaint   Patient presents with    Respiratory Distress     Came from [place] on nonrebreather at 10L. Normally wears 4-5 lpm. Lethargic,         Subjective:  Apparently is agreeable to scopes now and has finished prep. Per nursing, pt is having increased respiratory requirements and refusing BIPAP overnight      Objective:  Last 24Hrs  Temp  Av.3 °F (36.3 °C)  Min: 97.3 °F (36.3 °C)  Max: 97.5 °F (36.4 °C)  Resp  Av  Min: 16  Max: 22  Pulse  Av.7  Min: 84  Max: 106  Systolic (24hrs), Av , Min:98 , Max:144     Diastolic (24hrs), Av, Min:61, Max:98    SpO2  Av.4 %  Min: 54 %  Max: 100 %    I/O last 3 completed shifts:  In: 1500 [P.O.:1500]  Out: 3090 [Urine:3090]      General: resting in bed  Cardiovascular: Warm throughout, no edema  Respiratory: no respiratory distress, equal chest rise on 12L  Abdomen: soft,  nontender, nondistended  Skin: no obvious rashes or lesions appreciated, no jaundice  Extremities: moving all extremities      CBC  Recent Labs     25  0349 25  0303   WBC 12.3* 11.9*   RBC 2.99* 3.06*   HGB 8.0* 8.2*   HCT 28.8* 29.5*   MCV 96.3 96.4   MCH 26.8 26.8   MCHC 27.8* 27.8*   RDW 20.2* 19.7*    209   MPV 8.9 8.7       CMP  Recent Labs     25  0349 25  0303    140   K 4.9 4.8    102   CO2 31* 32*   BUN 49* 43*   CREATININE 1.1* 1.1*   GLUCOSE 176* 101*   CALCIUM 9.0 9.3   BILITOT 0.2 0.2   ALKPHOS 65 71   AST 10 13   ALT 11 11         Assessment/Plan:    Patient Active Problem List   Diagnosis    Anxiety    Chronic obstructive pulmonary disease (HCC)    History of DVT (deep vein thrombosis)    Hypertension    Agoraphobia    History of below-knee amputation of left lower extremity (HCC)    EKG abnormalities    Peripheral arterial disease    Chronic respiratory failure with hypoxia (HCC)    Pure  hypercholesterolemia    Anemia    Coronary atherosclerosis    Peripheral vascular disease    Acute on chronic respiratory failure with hypoxia and hypercapnia (HCC)    Palliative care encounter       84 y.o. female admitted for COPD with anemia     - PPI daily   - pt scheduled for EGD and colonoscopy today however, had increased oxygen requirements overnight   - will evaluate scopes pending if pt O2 requirements are able to be weaned down   - would recommend NPO due to respiratory status   - monitor hemodynamics   - will discuss with Dr. Bryan Brown DO  General Surgery Resident, PGY-2  Electronically signed on 3/5/2025 at 6:37 AM

## 2025-03-05 NOTE — ANESTHESIA POSTPROCEDURE EVALUATION
Department of Anesthesiology  Postprocedure Note    Patient: Priya Garcia  MRN: [id number]  YOB: 1940  Date of evaluation: 3/5/2025    Procedure Summary       Date: 03/05/25 Room / Location: [address]    Anesthesia Start: 1159 Anesthesia Stop: 1242    Procedures:       COLONOSCOPY DIAGNOSTIC      ESOPHAGOGASTRODUODENOSCOPY BIOPSY alson with Esophageal Brushing Diagnosis:       Anemia, unspecified type      (Anemia, unspecified type [D64.9])    Surgeons: Elizabeth Adams MD Responsible Provider: Den Shea MD    Anesthesia Type: MAC ASA Status: 4            Anesthesia Type: No value filed.    Anitha Phase I: Anitha Score: 9    Anitha Phase II: Anitha Score: 9    Anesthesia Post Evaluation    Patient location during evaluation: PACU  Patient participation: complete - patient participated  Level of consciousness: awake and alert  Airway patency: patent  Nausea & Vomiting: no nausea and no vomiting  Cardiovascular status: hemodynamically stable  Respiratory status: acceptable  Hydration status: euvolemic  There was medical reason for not using a multimodal analgesia pain management approach.Pain management: adequate    No notable events documented.

## 2025-03-05 NOTE — PLAN OF CARE
Problem: Safety - Adult  Goal: Free from fall injury  3/4/2025 2123 by Odette Torres, RN  Outcome: Progressing  3/4/2025 0829 by Niyah Felix, RN  Outcome: Progressing     Problem: Discharge Planning  Goal: Discharge to home or other facility with appropriate resources  Outcome: Progressing     Problem: Skin/Tissue Integrity  Goal: Skin integrity remains intact  Description: 1.  Monitor for areas of redness and/or skin breakdown  2.  Assess vascular access sites hourly  3.  Every 4-6 hours minimum:  Change oxygen saturation probe site  4.  Every 4-6 hours:  If on nasal continuous positive airway pressure, respiratory therapy assess nares and determine need for appliance change or resting period  Outcome: Progressing  Flowsheets (Taken 3/4/2025 0800 by Niyah Felix, RN)  Skin Integrity Remains Intact:   Monitor for areas of redness and/or skin breakdown   Assess vascular access sites hourly

## 2025-03-05 NOTE — PROGRESS NOTES
Date: 3/5/2025    Time: 12:18 AM    Patient Placed On BIPAP/CPAP/ Non-Invasive Ventilation?  Yes    If no must comment.  Facial area red/color change? No           If YES are Blister/Lesion present?No   If yes must notify nursing staff  BIPAP/CPAP skin barrier?  Yes    Skin barrier type:mepilexlite       Comments:        Traci Frey RCP

## 2025-03-05 NOTE — ANESTHESIA PRE PROCEDURE
ammonium lactate (LAC-HYDRIN) 12 % lotion Apply topically twice daily 3/3/25  Yes Severiano Davis MD   ferrous sulfate (IRON 325) 325 (65 Fe) MG tablet Take 1 tablet by mouth every other day 3/3/25  Yes Severiano Davis MD   folic acid (FOLVITE) 1 MG tablet Take 1 tablet by mouth daily 3/3/25  Yes Severiano Davis MD   docusate (COLACE, DULCOLAX) 100 MG CAPS Take 100 mg by mouth 2 times daily as needed (In case of constipation) 3/3/25  Yes Severiano Davis MD   polyethylene glycol (GLYCOLAX) 17 g packet Take 1 packet by mouth daily as needed for Constipation 3/3/25 4/2/25 Yes Severiano Davis MD   senna (SENOKOT) 8.6 MG tablet Take 1 tablet by mouth 2 times daily If needed for constipation 3/3/25 3/3/26 Yes Severiano Davis MD   Omega-3 Fatty Acids (FISH OIL) 500 MG CAPS Take 500 mg by mouth daily 3/3/25  Yes Severiano Davis MD   pantoprazole (PROTONIX) 40 MG tablet Take 1 tablet by mouth in the morning and at bedtime 3/3/25 4/2/25 Yes Severiano Davis MD   ascorbic acid (VITAMIN C) 500 MG tablet Take 1 tablet by mouth daily 3/3/25  Yes Severiano Davis MD   vitamin D 50 MCG (2000 UT) CAPS capsule Take 1 capsule by mouth daily 3/3/25  Yes Severiano Davis MD   OXYGEN Inhale 6 L into the lungs daily 6 LITERS baseline, titrate to spo2 goal of 88-92% 3/3/25  Yes Severiano Davis MD   predniSONE (DELTASONE) 5 MG tablet Take 4 tablets by mouth daily for 1 day, THEN 2 tablets daily for 3 days, THEN 1 tablet daily for 3 days. 3/3/25 3/10/25 Yes Severiano Davis MD   aspirin 81 MG chewable tablet Take 1 tablet by mouth daily 3/3/25  Yes Severiano Davis MD   ipratropium 0.5 mg-albuterol 2.5 mg (DUONEB) 0.5-2.5 (3) MG/3ML SOLN

## 2025-03-05 NOTE — OP NOTE
Operative Note: EGD and Colonoscopy    Priya Garcia     DATE OF PROCEDURE: 3/5/2025  SURGEON: Dr. ELIZABETH RAMOS MD, M.D.     PREOPERATIVE DIAGNOSES:   Anemia     POSTOPERATIVE DIAGNOSES:   Possible small pyloric channel ulcer  Moderate gastritis  R/O esophageal candida  Duodenitis   Hiatal hernia, 3 cm    Moderately severe sigmoid diverticulosis    OPERATION:    EGD esophagogastroduodenoscopy With antral, duodenal biopsies  Esophageal brushings                   Colonoscopy to the cecum    SPECIMENS:  ID Type Source Tests Collected by Time Destination   A : antral bx Tissue Tissue SURGICAL PATHOLOGY Elizabeth Ramos MD 3/5/2025 1212    B : duodenum bx Tissue Tissue SURGICAL PATHOLOGY Elizabeth Ramos MD 3/5/2025 1213    C : Esophageal Brushing Tissue Esophageal [place] CYTOLOGY, NON-GYN Elizabeth Ramos MD 3/5/2025 1218        BLOOD LOSS: Minimal    ANESTHESIA: LMAC    CONSENT AND INDICATIONS:  This is a 84 y.o. year old female who is having the above. I have discussed with the patient and/or the patient representative the indication, alternatives, and the possible risks and/or complications of the planned procedure and the anesthesia methods. The patient and/or patient representative appear to understand and agree to proceed.    PROCEDURE: The patient was placed on the table and sedated via LMAC. Bite block was placed. A lubricated scope was easily passed into the esophagus. The entirety of the esophagus looked abnormal: r/o candida esophagitis. Brushings were taken. The gastroesophageal junction was at 43 cm. The scope was passed into the stomach and retroflexed. There was a 3 cm hiatal hernia, 40-43 cm. The scope was passed down toward the pylorus. The antral mucosa all looked abnormal: moderate antral gastritis. Biopsy was taken. The scope was then passed through the pylorus into the duodenal bulb which looked abnormal: there was edema at the pylorus and a possible small pyloric  channel ulcer, then around to the distal duodenum which looked abnormal: mild duodenitis and biopsies were taken, and the scope was then withdrawn.     The patient was then placed in left lateral decubitus position. A rectal exam was done and no masses were felt.  A lubricated scope was passed into the rectum which looked normal.  The scope was passed all the way around to the cecum. Moderately severe sigmoid diverticulosis was found. The bowel prep was moderately poor with dark, thick liquid stool intermittently throughout the colon obscuring some of the mucosa. The cecum could not be completely visualized due to stool as well.  The TI and appendiceal orifice were identified.  The scope was then slowly withdrawn, each area was examined again on the way out.  The scope was retroflexed in the rectum and it was normal. Withdrawal time was at least 6 minutes. The patient tolerated the procedure well.     PLAN:   Follow up pathology results.  Continue PPI.    Repeat colonoscopy as needed.    Physician Signature: Electronically signed by Dr. Elizabeth Adams FACS      Send copy of H&P to PCP, David Coffey MD and referring physician, No ref. provider found

## 2025-03-05 NOTE — PROGRESS NOTES
[facility]  Department of Pulmonary, Critical Care and Sleep Medicine  Pulmonary Health & Research [place]  Department of Internal Medicine  Progress Note    SUBJECTIVE:    Priya is seen and examined for acute on chronic hypoxic respiratory failure. Has returned to her baseline 5-6L supplemental O2. States she has not been out of bed recently. Recommend out of bed for meals if tolerates.       OBJECTIVE:  Vitals:    03/05/25 0705 03/05/25 0745 03/05/25 0747 03/05/25 0830   BP: 120/69      Pulse: 74      Resp: 20      Temp: 97.3 °F (36.3 °C)      TempSrc: Temporal      SpO2: 91% (!) 86% 90% 100%   Weight:       Height:         Constitutional: Alert, oriented, NAD  EENT: No icterus. No thrush. Dry MM    Neck: No thyromegaly. No elevated JVP. Trachea was midline.   Respiratory: Labored, diminished  Cardiovascular: Irregular, No murmur. No rubs.      Pulses:  Equal bilaterally.    Abdomen: Soft without organomegaly. No rebound, rigidity.  No guarding.  Lymphatic: No lymphadenopathy.  Musculoskeletal: Weakness / gross deficits  Extremities:  No lower extremity edema. Reflexes appear adequate.   Skin:  Warm and dry.  + rashes.   Neurological/Psychiatric: No acute psychosis. Cranial nerves are intact.        DATA:  The data collected below information that was obtained, reviewed, analyzed and interpreted today. Imaging test are reviewed with the radiologist during weekly conference rounds. Comparison to previous images are always explored.     Monitor Strips:  My interpretation and reviewed of the cardiac monitor and further discussion with technical team reveals no changes noted and the patient is in sinus rhythm     RADIOLOGY:  My interpretation and review of the current films reveals  New consolidation within the posteromedial, inferior left lower lobe consistent with pneumonia.  There is a very small left pleural effusion. Some additional patchy opacity within the posterior

## 2025-03-05 NOTE — PROGRESS NOTES
Patient okay for discharge. [facility].  scheduled for 1930 [address]ma.   Dara Roman RN

## 2025-03-05 NOTE — PROGRESS NOTES
Physical Therapy  Facility/Department: [facility] OR    Name: Priya Garcia  : 1940  MRN: [id number]    Chart reviewed and PT treatment attempted this am.  Pt declined at this time.  Will check back at later time/date.     Sabrina Golden, PT [id number]

## 2025-03-05 NOTE — PROGRESS NOTES
Spoke with Dahlia urology NP. Please place ventura and patient can discharge tonight and patient will follow up in the office outpatient. Family med resident notified.  Dara Roman RN

## 2025-03-05 NOTE — CARE COORDINATION
3/5/2025  Social Work Discharge Planning:Scope today.  Plan is to return to [facility] at discharge. Precert auth has been waived. 7000, jaya and transport form are in chart. Electronically signed by DESIRE Torres on 3/5/2025 at 9:51 AM    3/5/2025Social Work Discharge Planning:If Pt doesn't discharge today we will need updated therapy for a new precert-but no need to wait for auth,. Electronically signed by DESIRE Torres on 3/5/2025 at 3:11 PM    3/5/2025 Social Work Discharge Planning: was asked to set up a \"will call\" for possible discharge today. Will call placed with [facility].Electronically signed by DESIRE Torres on 3/5/2025 at 3:42 PM

## 2025-03-05 NOTE — PROGRESS NOTES
Comprehensive Nutrition Assessment    Type and Reason for Visit:  Reassess    Nutrition Recommendations/Plan:   When medically feasible, recommend resume Carb Controlled diet (4 carb/meal) and will add ONS at that time.  Continue inpatient monitoring     Malnutrition Assessment:  Malnutrition Status:  At risk for malnutrition (02/26/25 3694)    Context:  Chronic Illness     Findings of the 6 clinical characteristics of malnutrition:  Energy Intake:  Mild decrease in energy intake  Weight Loss:  Unable to assess (? accuracy of CBW)     Body Fat Loss:  Unable to assess     Muscle Mass Loss:  Unable to assess    Fluid Accumulation:  No fluid accumulation     Strength:  Not Performed    Nutrition Assessment:    Pt currently NPO. EGD and colonoscopy were planned today, but may be postponed d/t increased O2 requirements. NPO also ordered d/t respiratory status. Will continue to monitor pt status and PO progression.    Nutrition Related Findings:    A&Ox4, +2 edema, I/O WNL, round abd +BS, dentures Wound Type: None       Current Nutrition Intake & Therapies:    Average Meal Intake: NPO  Average Supplements Intake: NPO  Diet NPO    Anthropometric Measures:  Height: 165.1 cm (5' 5\")  Ideal Body Weight (IBW): 125 lbs (57 kg)       Current Body Weight: 68.5 kg (151 lb 0.2 oz), 120.8 % IBW. Weight Source: Bed scale (2/28)  Current BMI (kg/m2): 25.1  Usual Body Weight: 68.5 kg (151 lb) (11/27/24 OV)     % Weight Change (Calculated): 24.5  Weight Adjustment For: Amputation  Total Adjusted Percentage (Calculated): 5.9  Adjusted Ideal Body Weight (lbs) (Calculated): 117.6 lbs  Adjusted Ideal Body Weight (kg) (Calculated): 53.45 kg  Adjusted % Ideal Body Weight (Calculated): 128.4  Adjusted BMI (kg/m2) (Calculated): 26.6  BMI Categories: Overweight (BMI 25.0-29.9)    Estimated Daily Nutrient Needs:  Energy Requirements Based On: Kcal/kg  Weight Used for Energy Requirements: Usual  Energy (kcal/day): 4554-2174  Weight Used for  Protein Requirements: Adjusted  Protein (g/day): 85-95  Method Used for Fluid Requirements: 1 ml/kcal  Fluid (ml/day): 4563-2466 ml    Nutrition Diagnosis:   Inadequate protein-energy intake related to decreased appetite as evidenced by poor intake prior to admission    Nutrition Interventions:   Food and/or Nutrient Delivery: Continue NPO (advance diet as tolerated when medically feasible)  Nutrition Education/Counseling: Education/Counseling not indicated (to return to [facility] at discharge)  Coordination of Nutrition Care: Continue to monitor while inpatient       Goals:  Goals: Initiation of nutrition  Type of Goal: New goal  Previous Goal Met: Goal(s) Achieved    Nutrition Monitoring and Evaluation:   Behavioral-Environmental Outcomes: None Identified  Food/Nutrient Intake Outcomes: Progression of Nutrition  Physical Signs/Symptoms Outcomes: Biochemical Data, GI Status, Fluid Status or Edema, Nutrition Focused Physical Findings, Skin, Weight    Discharge Planning:    Too soon to determine     Sylvia Sena RD, CNSC, LD  Contact: n [phone]

## 2025-03-10 NOTE — PROGRESS NOTES
[facility]YSIC[facility]     HISTORY OF PRESENT ILLNESS:    Priya Garcia is a 84 y.o. year old female here for evaluation of COPD, pulmonary lung nodule.  The patient has an extensive pulmonary history.  She was seen initially by Dr. Moore and then DrTravis Would see in the [facility] area.  She states that she has been oxygen for the last 12 years.  She did go to pulmonary rehab in the past.  She also states that she was evaluated for transplant in [place] but was denied due to age.  She has had multiple hospitalizations for COPD exacerbation.  Last hospitalization was 2 months ago.  She reports that she does try to stay as active as possible.  She does do the dishes at home.  She denies symptoms of shortness of breath with showering or shortness of breath with tying of shoes.  She does have symptoms of dry mouth, coughing, wheezing.  She reports that she is changing pulmonologist because they are now living in [place].  Home medications include trilogy rescue inhaler however she does not currently have 1 and DuoNebs which she uses twice a day.  Her home medical company was [employer].      Patient seen and examined.  Her  is present with her.  They report that she is continuing to use her DuoNebs twice a day.  She is using her rescue inhaler.  She is using her trilogy daily.  She does have a productive cough for white sputum along with a runny nose.  She is currently wearing 5 to 6 L of nasal cannula.  She is sore short of air with exertion but this is usually precipitated by anxiety and hyperventilation.  Per her  they will be going on a cruise this year and they would like us to order her an Inogen so that she is able to take a portable concentrator with her on the plane.    Patient seen and examined on August 10, 2023.  They are back from their cruise and reports that they had fun.  She is trying to stay in the house and away from the humid air and

## (undated) PROCEDURE — 0DD58ZX EXTRACTION OF ESOPHAGUS, VIA NATURAL OR ARTIFICIAL OPENING ENDOSCOPIC, DIAGNOSTIC: ICD-10-PCS

## (undated) PROCEDURE — 5A09357 ASSISTANCE WITH RESPIRATORY VENTILATION, LESS THAN 24 CONSECUTIVE HOURS, CONTINUOUS POSITIVE AIRWAY PRESSURE: ICD-10-PCS

## (undated) PROCEDURE — 0DJD8ZZ INSPECTION OF LOWER INTESTINAL TRACT, VIA NATURAL OR ARTIFICIAL OPENING ENDOSCOPIC: ICD-10-PCS

## (undated) PROCEDURE — 0DB98ZX EXCISION OF DUODENUM, VIA NATURAL OR ARTIFICIAL OPENING ENDOSCOPIC, DIAGNOSTIC: ICD-10-PCS

## (undated) DEVICE — SPONGE GZ W4XL4IN RAYON POLY CVR W/NONWOVEN FAB STRL 2/PK

## (undated) DEVICE — GRADUATE TRIANG MEASURE 1000ML BLK PRNT

## (undated) DEVICE — FORCEPS BX OVL CUP FEN DISPOSABLE CAP L 160CM RAD JAW 4

## (undated) DEVICE — BLOCK BITE 60FR RUBBER ADLT DENTAL